# Patient Record
Sex: FEMALE | Race: WHITE | Employment: UNEMPLOYED | URBAN - METROPOLITAN AREA
[De-identification: names, ages, dates, MRNs, and addresses within clinical notes are randomized per-mention and may not be internally consistent; named-entity substitution may affect disease eponyms.]

---

## 2018-10-11 ENCOUNTER — HOSPITAL ENCOUNTER (INPATIENT)
Age: 23
LOS: 12 days | Discharge: HOME | DRG: 885 | End: 2018-10-23
Attending: EMERGENCY MEDICINE | Admitting: PSYCHIATRY & NEUROLOGY
Payer: COMMERCIAL

## 2018-10-11 DIAGNOSIS — R45.851 SUICIDAL IDEATION: Primary | ICD-10-CM

## 2018-10-11 PROBLEM — F32.A DEPRESSION WITH SUICIDAL IDEATION: Status: ACTIVE | Noted: 2018-10-11

## 2018-10-11 PROCEDURE — 6370000000 HC RX 637 (ALT 250 FOR IP): Performed by: EMERGENCY MEDICINE

## 2018-10-11 PROCEDURE — 1240000000 HC EMOTIONAL WELLNESS R&B

## 2018-10-11 PROCEDURE — 99285 EMERGENCY DEPT VISIT HI MDM: CPT

## 2018-10-11 PROCEDURE — 6370000000 HC RX 637 (ALT 250 FOR IP): Performed by: NURSE PRACTITIONER

## 2018-10-11 RX ORDER — HYDROXYZINE HYDROCHLORIDE 25 MG/1
50 TABLET, FILM COATED ORAL 3 TIMES DAILY PRN
Status: DISCONTINUED | OUTPATIENT
Start: 2018-10-11 | End: 2018-10-23 | Stop reason: HOSPADM

## 2018-10-11 RX ORDER — ALPRAZOLAM 0.25 MG/1
0.25 TABLET ORAL 4 TIMES DAILY PRN
COMMUNITY
End: 2018-10-25 | Stop reason: DRUGHIGH

## 2018-10-11 RX ORDER — CEFUROXIME AXETIL 250 MG/1
250 TABLET ORAL DAILY
Status: ON HOLD | COMMUNITY
End: 2019-02-09 | Stop reason: ALTCHOICE

## 2018-10-11 RX ORDER — DOCUSATE SODIUM 100 MG/1
100 CAPSULE, LIQUID FILLED ORAL DAILY
COMMUNITY
End: 2019-02-08 | Stop reason: ALTCHOICE

## 2018-10-11 RX ORDER — ACETAMINOPHEN 325 MG/1
650 TABLET ORAL EVERY 4 HOURS PRN
Status: DISCONTINUED | OUTPATIENT
Start: 2018-10-11 | End: 2018-10-23 | Stop reason: HOSPADM

## 2018-10-11 RX ORDER — ACETAMINOPHEN 325 MG/1
650 TABLET ORAL ONCE
Status: COMPLETED | OUTPATIENT
Start: 2018-10-11 | End: 2018-10-11

## 2018-10-11 RX ORDER — TRAZODONE HYDROCHLORIDE 50 MG/1
50 TABLET ORAL NIGHTLY PRN
Status: DISCONTINUED | OUTPATIENT
Start: 2018-10-12 | End: 2018-10-23 | Stop reason: HOSPADM

## 2018-10-11 RX ORDER — ALPRAZOLAM 0.25 MG/1
0.25 TABLET ORAL 4 TIMES DAILY PRN
Status: DISCONTINUED | OUTPATIENT
Start: 2018-10-11 | End: 2018-10-20

## 2018-10-11 RX ORDER — DOCUSATE SODIUM 100 MG/1
100 CAPSULE, LIQUID FILLED ORAL 2 TIMES DAILY
Status: DISCONTINUED | OUTPATIENT
Start: 2018-10-11 | End: 2018-10-12

## 2018-10-11 RX ORDER — NICOTINE 21 MG/24HR
1 PATCH, TRANSDERMAL 24 HOURS TRANSDERMAL DAILY
Status: DISCONTINUED | OUTPATIENT
Start: 2018-10-12 | End: 2018-10-12

## 2018-10-11 RX ORDER — DESONIDE 0.5 MG/G
CREAM TOPICAL 2 TIMES DAILY
COMMUNITY
End: 2019-02-08 | Stop reason: ALTCHOICE

## 2018-10-11 RX ORDER — LORAZEPAM 2 MG/ML
1 INJECTION INTRAMUSCULAR EVERY 4 HOURS PRN
Status: DISCONTINUED | OUTPATIENT
Start: 2018-10-11 | End: 2018-10-23 | Stop reason: HOSPADM

## 2018-10-11 RX ORDER — DICYCLOMINE HYDROCHLORIDE 10 MG/1
10 CAPSULE ORAL EVERY 6 HOURS PRN
Status: ON HOLD | COMMUNITY
End: 2018-11-07

## 2018-10-11 RX ADMIN — ALPRAZOLAM 0.25 MG: 0.25 TABLET ORAL at 22:39

## 2018-10-11 RX ADMIN — ACETAMINOPHEN 650 MG: 325 TABLET, FILM COATED ORAL at 22:38

## 2018-10-11 ASSESSMENT — PAIN SCALES - GENERAL
PAINLEVEL_OUTOF10: 10
PAINLEVEL_OUTOF10: 0

## 2018-10-11 ASSESSMENT — ENCOUNTER SYMPTOMS
ABDOMINAL DISTENTION: 0
EYE ITCHING: 0
RHINORRHEA: 0
VOMITING: 0
TROUBLE SWALLOWING: 0
SORE THROAT: 0
BACK PAIN: 0
VOICE CHANGE: 0
CONSTIPATION: 0
EYE PAIN: 0
COLOR CHANGE: 0
NAUSEA: 0
COUGH: 0
WHEEZING: 0
ABDOMINAL PAIN: 0
EYE DISCHARGE: 0
DIARRHEA: 0
SHORTNESS OF BREATH: 0

## 2018-10-11 ASSESSMENT — SLEEP AND FATIGUE QUESTIONNAIRES
AVERAGE NUMBER OF SLEEP HOURS: 5
SLEEP PATTERN: EARLY AWAKENING
DIFFICULTY ARISING: NO
DO YOU USE A SLEEP AID: NO
DIFFICULTY STAYING ASLEEP: YES
SLEEP PATTERN: EARLY AWAKENING
DIFFICULTY FALLING ASLEEP: NO
DIFFICULTY STAYING ASLEEP: YES
DIFFICULTY FALLING ASLEEP: NO
DO YOU HAVE DIFFICULTY SLEEPING: YES
AVERAGE NUMBER OF SLEEP HOURS: 5
DIFFICULTY ARISING: NO
DO YOU HAVE DIFFICULTY SLEEPING: YES
DO YOU USE A SLEEP AID: NO
RESTFUL SLEEP: YES
RESTFUL SLEEP: YES

## 2018-10-11 ASSESSMENT — LIFESTYLE VARIABLES: HISTORY_ALCOHOL_USE: NO

## 2018-10-11 ASSESSMENT — PAIN - FUNCTIONAL ASSESSMENT: PAIN_FUNCTIONAL_ASSESSMENT: 0-10

## 2018-10-12 LAB
-: ABNORMAL
AMORPHOUS: ABNORMAL
BACTERIA: ABNORMAL
BILIRUBIN URINE: ABNORMAL
CASTS UA: ABNORMAL /LPF
COLOR: ABNORMAL
COMMENT UA: ABNORMAL
CRYSTALS, UA: ABNORMAL /HPF
EPITHELIAL CELLS UA: ABNORMAL /HPF
GLUCOSE URINE: NEGATIVE
KETONES, URINE: NEGATIVE
LEUKOCYTE ESTERASE, URINE: NEGATIVE
MUCUS: ABNORMAL
NITRITE, URINE: NEGATIVE
OTHER OBSERVATIONS UA: ABNORMAL
PH UA: 6 (ref 5–8)
PROTEIN UA: NEGATIVE
RBC UA: ABNORMAL /HPF
RENAL EPITHELIAL, UA: ABNORMAL /HPF
SPECIFIC GRAVITY UA: 1.03 (ref 1–1.03)
TRICHOMONAS: ABNORMAL
TURBIDITY: ABNORMAL
URINE HGB: NEGATIVE
UROBILINOGEN, URINE: NORMAL
WBC UA: ABNORMAL /HPF
YEAST: ABNORMAL

## 2018-10-12 PROCEDURE — 1240000000 HC EMOTIONAL WELLNESS R&B

## 2018-10-12 PROCEDURE — 81001 URINALYSIS AUTO W/SCOPE: CPT

## 2018-10-12 PROCEDURE — 87086 URINE CULTURE/COLONY COUNT: CPT

## 2018-10-12 PROCEDURE — 6370000000 HC RX 637 (ALT 250 FOR IP): Performed by: NURSE PRACTITIONER

## 2018-10-12 PROCEDURE — 6370000000 HC RX 637 (ALT 250 FOR IP): Performed by: PSYCHIATRY & NEUROLOGY

## 2018-10-12 PROCEDURE — 90792 PSYCH DIAG EVAL W/MED SRVCS: CPT | Performed by: PSYCHIATRY & NEUROLOGY

## 2018-10-12 RX ORDER — DOCUSATE SODIUM 100 MG/1
100 CAPSULE, LIQUID FILLED ORAL DAILY
Status: DISCONTINUED | OUTPATIENT
Start: 2018-10-12 | End: 2018-10-23 | Stop reason: HOSPADM

## 2018-10-12 RX ORDER — BUPROPION HYDROCHLORIDE 150 MG/1
150 TABLET ORAL DAILY
Status: DISCONTINUED | OUTPATIENT
Start: 2018-10-13 | End: 2018-10-14

## 2018-10-12 RX ORDER — CEFUROXIME AXETIL 250 MG/1
250 TABLET ORAL DAILY
Status: DISCONTINUED | OUTPATIENT
Start: 2018-10-12 | End: 2018-10-13

## 2018-10-12 RX ORDER — DICYCLOMINE HYDROCHLORIDE 10 MG/1
10 CAPSULE ORAL EVERY 6 HOURS PRN
Status: DISCONTINUED | OUTPATIENT
Start: 2018-10-12 | End: 2018-10-23 | Stop reason: HOSPADM

## 2018-10-12 RX ORDER — ESCITALOPRAM OXALATE 10 MG/1
15 TABLET ORAL DAILY
Status: DISCONTINUED | OUTPATIENT
Start: 2018-10-12 | End: 2018-10-12

## 2018-10-12 RX ORDER — ESCITALOPRAM OXALATE 20 MG/1
20 TABLET ORAL DAILY
Status: DISCONTINUED | OUTPATIENT
Start: 2018-10-13 | End: 2018-10-15

## 2018-10-12 RX ADMIN — TRAZODONE HYDROCHLORIDE 50 MG: 50 TABLET ORAL at 21:01

## 2018-10-12 RX ADMIN — ESCITALOPRAM OXALATE 15 MG: 10 TABLET ORAL at 11:47

## 2018-10-12 RX ADMIN — HYDROXYZINE HYDROCHLORIDE 50 MG: 25 TABLET, FILM COATED ORAL at 21:01

## 2018-10-12 RX ADMIN — DOCUSATE SODIUM 100 MG: 100 CAPSULE, LIQUID FILLED ORAL at 11:47

## 2018-10-12 RX ADMIN — CEFUROXIME AXETIL 250 MG: 250 TABLET ORAL at 14:42

## 2018-10-12 RX ADMIN — ACETAMINOPHEN 650 MG: 325 TABLET, FILM COATED ORAL at 14:43

## 2018-10-12 ASSESSMENT — PAIN DESCRIPTION - LOCATION: LOCATION: HEAD

## 2018-10-12 ASSESSMENT — LIFESTYLE VARIABLES: HISTORY_ALCOHOL_USE: NO

## 2018-10-12 ASSESSMENT — PAIN DESCRIPTION - PAIN TYPE: TYPE: ACUTE PAIN

## 2018-10-12 ASSESSMENT — PAIN SCALES - GENERAL
PAINLEVEL_OUTOF10: 0
PAINLEVEL_OUTOF10: 6

## 2018-10-12 NOTE — H&P
HISTORY and Alfreda Gaitan [id number]       NAME:  Ruthie Weber  MRN: [id number]   YOB: 1995   Date: 10/12/2018   Age: 25 y.o. Gender: female     COMPLAINT AND PRESENT HISTORY:    Ruthie Weber is a 25 y.o.  female, admitted because of increasing depression with suicidal ideation. Patient had No plans. Patient has no history of previous suicide attempts. Patient denies any homicidal ideation. Patients current stressors include feelings of increased anxiety, depression and panic. Patient states that she attended [employer] and transferred to school in [place] for college and developed a stomach flu was hospitalized for 5 days and after discharge felt overwhelmed and also felt that she moved too quickly to an out of state college. Patient reports that she needed more support from her mother   Patient states that she has had depression for some time and was under management of psychiatrists however she was unable to find a psychiatrist in [place] and allowed her medications to lapse. Patient has been noncompliant with her psychiatric medications over a few months now. Patient admits to feeling hopeless, helpless, worthless, with a general lack of interest in everyday activities. . No history of auditory, visual or tactile hallucinations. Patient denies any current alcohol or substance abuse. Patient lives now with her mother. Patient has some  somatic complaints of burning with urination, headaches, body aches and anal itching that has been going on for a few weeks. patient denies any fever/chills, chest pain, shortness of breath.        DIAGNOSTIC RESULTS     PAST MEDICAL HISTORY     Past Medical History:   Diagnosis Date    Gastritis     PCOS (polycystic ovarian syndrome)        Pt denies any history of Diabetes mellitus type 2, hypertension, stroke, heart disease, COPD, Asthma, GERD, HLD, Cancer, Seizures,Thyroid disease, Kidney Disease, Hepatitis, TB.    SURGICAL HISTORY       Past Surgical History:   Procedure Laterality Date    OVARIAN CYST REMOVAL         FAMILY HISTORY     History reviewed. No pertinent family history. SOCIAL HISTORY       Social History     Social History    Marital status: Single     Spouse name: N/A    Number of children: N/A    Years of education: N/A     Social History Main Topics    Smoking status: Never Smoker    Smokeless tobacco: Never Used    Alcohol use No    Drug use: No    Sexual activity: Not Asked     Other Topics Concern    None     Social History Narrative    None        REVIEW OF SYSTEMS      Allergies   Allergen Reactions    Phenergan [Promethazine Hcl]      Muscle spasm       No current facility-administered medications on file prior to encounter. No current outpatient prescriptions on file prior to encounter. General health:  Fairly good. No fever or chills. Skin:  No itching, redness or rash. Head, eyes, ears, nose, throat:  No headache, epistaxis, rhinorrhea hearing loss or sore throat. Neck:  No pain, stiffness or masses. Cardiovascular/Respiratory system:  No chest pain, palpitation, shortness of breath, coughing or expectoration. Gastrointestinal tract: No abdominal pain, nausea, vomiting, diarrhea or constipation. Patient complains of some diarrhea and anal itchiness that has been going on for a few weeks. Genitourinary:  No burning on micturition. No hesitancy, urgency, frequency or discoloration of urine. Patient complains of some burning with urination. Locomotor:  No bone or joint pains. No swelling or deformities. Neuropsychiatric:  See HPI. GENERAL PHYSICAL EXAM:     Vitals: /74   Pulse 84   Temp 98 °F (36.7 °C) (Oral)   Resp 14   Ht 5' 7\" (1.702 m)   Wt 205 lb (93 kg)   LMP 10/11/2018   SpO2 97%   BMI 32.11 kg/m²  Body mass index is 32.11 kg/m². Pt was examined with a nurse present in the room.      GENERAL

## 2018-10-12 NOTE — ED NOTES
Provisional Diagnosis:   Patient reports Major Depression, Mild Autism     Psychosocial and Contextual Factors:  Patient reports that she sees Dr. Abigail Kocher in the [facility] Summary:   Patient: X  Family:   Agency: X(EPIC)     Present Suicidal Behavior:     Verbal: Yes   Attempt: Patient denies    Past Suicidal Behavior:    Verbal: Patient denies    Attempt: Patient denies     Self-Injurious/Self-Mutilation: Patient denies       Trauma Identified:  Patient denies      Protective Factors: Patient has Blue Cross/ Shield     Risk Factors:  Patient has had previous psychiatric hospitalization. Substance Abuse: Patient denies     Clinical Summary:  Patient is a 58-year-old  female who came into the [facility] emergency center on a voluntary status at the request of her psychiatrist Dr. Abigail Kocher with concerns for increase in depression and suicidal ideation. Patient reports that she is suicidal. She reports that it is triggered by her being off her medications while in [place] where she was going to school. Patient reports that she could not find a doctor to get her medications filled. Patient reports that she was also hospitalized for 5 days with stomach problems while in [place]. Patient reports panic attacks where she feels like she cant breathe and feels like rocks are on her chest. Patient reports an increase in anxiety, depression, and suicidal thoughts. Patient denies homicidal ideation. Patient denies auditory and visual hallucination. Level of Care Disposition:  Writer consulted with Heather Packer. Patient to be admitted to [facility] for safety and stabilization.

## 2018-10-12 NOTE — CARE COORDINATION
BHI Biopsychosocial Assessment    Current Level of Psychosocial Functioning     Independent   Dependent  X  Minimal Assist     Comments: PT reports that prior to admission she was living with her mother and stepfather in [place], [place], PT reports a plan to return there following discharge PT reports that she gets SSI income. PT presents on admission with an increase in symptoms of Depression and Anxiety and having  suicidal ideations. Psychosocial High Risk Factors (check all that apply)    Unable to obtain meds   Chronic illness/pain    Substance abuse   Lack of Family Support   Financial stress   Isolation X  Inadequate Community Resources  Suicide attempt(s)  Not taking medications X  Victim of crime   Developmental Delay  Unable to manage personal needs    Age 72 or older   Homeless  No transportation   Readmission within 30 days  Unemployment  Traumatic Event    Psychiatric Advanced Directives: none reported     Family to Involve in Treatment: PT reports that her mother and stepfather are supportive and involved in her care. Sexual Orientation:  SYLVESTER    Patient Strengths: insurance, follows up with private Psychiatrist Dr. Madie Rodrigues at [facility] ,denies any alcohol or illicit drug use. Patient Barriers: previous Psychiatric admissions , presenting on admission with suicidal ideation     Opiate Education Provided: N/A PT denies and does not have documented history of Opiate or Heroin use/abuse. CMHC/mental health history: PT reports that she has been linked with Dr. Madie Rodrigues at [facility] for mental health services. Plan of Care   medication management, group/individual therapies, family meetings, psycho -education, treatment team meetings to assist with stabilization, referral to community resources     Initial Discharge Plan:  PT reports a plan to return home in [place] to live with her mother and stepfather and to continue to follow up with therapy with Dr. Monie Rivera at [facility].       Clinical

## 2018-10-13 LAB
CULTURE: NORMAL
Lab: NORMAL
SPECIMEN DESCRIPTION: NORMAL
STATUS: NORMAL

## 2018-10-13 PROCEDURE — 6370000000 HC RX 637 (ALT 250 FOR IP): Performed by: INTERNAL MEDICINE

## 2018-10-13 PROCEDURE — 6370000000 HC RX 637 (ALT 250 FOR IP): Performed by: NURSE PRACTITIONER

## 2018-10-13 PROCEDURE — 99221 1ST HOSP IP/OBS SF/LOW 40: CPT | Performed by: INTERNAL MEDICINE

## 2018-10-13 PROCEDURE — 6370000000 HC RX 637 (ALT 250 FOR IP): Performed by: PSYCHIATRY & NEUROLOGY

## 2018-10-13 PROCEDURE — 1240000000 HC EMOTIONAL WELLNESS R&B

## 2018-10-13 RX ORDER — NITROFURANTOIN 25; 75 MG/1; MG/1
100 CAPSULE ORAL EVERY 12 HOURS SCHEDULED
Status: DISCONTINUED | OUTPATIENT
Start: 2018-10-13 | End: 2018-10-13 | Stop reason: ALTCHOICE

## 2018-10-13 RX ORDER — CEFUROXIME AXETIL 250 MG/1
250 TABLET ORAL EVERY 12 HOURS SCHEDULED
Status: DISCONTINUED | OUTPATIENT
Start: 2018-10-14 | End: 2018-10-23 | Stop reason: HOSPADM

## 2018-10-13 RX ADMIN — ALPRAZOLAM 0.25 MG: 0.25 TABLET ORAL at 21:25

## 2018-10-13 RX ADMIN — NITROFURANTOIN (MONOHYDRATE/MACROCRYSTALS) 100 MG: 75; 25 CAPSULE ORAL at 09:28

## 2018-10-13 RX ADMIN — ACETAMINOPHEN 650 MG: 325 TABLET, FILM COATED ORAL at 09:25

## 2018-10-13 RX ADMIN — ESCITALOPRAM OXALATE 20 MG: 20 TABLET ORAL at 09:25

## 2018-10-13 RX ADMIN — ACETAMINOPHEN 650 MG: 325 TABLET, FILM COATED ORAL at 21:25

## 2018-10-13 RX ADMIN — BUPROPION HYDROCHLORIDE 150 MG: 150 TABLET, FILM COATED, EXTENDED RELEASE ORAL at 09:25

## 2018-10-13 RX ADMIN — HYDROXYZINE HYDROCHLORIDE 50 MG: 25 TABLET, FILM COATED ORAL at 22:22

## 2018-10-13 RX ADMIN — DOCUSATE SODIUM 100 MG: 100 CAPSULE, LIQUID FILLED ORAL at 09:25

## 2018-10-13 ASSESSMENT — PAIN SCALES - GENERAL
PAINLEVEL_OUTOF10: 3
PAINLEVEL_OUTOF10: 8
PAINLEVEL_OUTOF10: 0
PAINLEVEL_OUTOF10: 1

## 2018-10-13 NOTE — BH NOTE
Psychoeducation Group Note    Date: 10/12  Start Time: 8:30  End Time: 9:00    Number Participants in Group:  11/22    Goal:   group  Topic:     Discipline Responsible:   OT  AT  [facility].  RT BHT2 Other       Participation Level:     None  Minimal   x Active Listener  Interactive    Monopolizing         Participation Quality:  x Appropriate  Inappropriate   x       Attentive        Intrusive   x       Sharing        Resistant   x       Supportive        Lethargic       Affective:   x Congruent  Incongruent  Blunted  Flat    Constricted  Anxious  Elated  Angry    Labile  Depressed  Other         Cognitive:  x Alert  Oriented PPTP     Concentration x G  F  P   Attention Span x G  F  P   Short-Term Memory x G  F  P   Long-Term Memory x G  F  P   ProblemSolving/  Decision Making x G  F  P   Ability to Process  Information x G  F  P      Contributing Factors             Delusional             Hallucinating             Flight of Ideas             Other:       Modes of Intervention:   Education x Support  Exploration    Clarifying  Problem Solving  Confrontation   x Socialization  Limit Setting  Reality Testing    Activity  Movement  Media    Other:            Response to Learning:  x Able to verbalize current knowledge/experience   x Able to verbalize/acknowledge new learning   x Able to retain information   x Capable of insight   x Able to change behavior   x Progressing to goal    Other:        Comments:

## 2018-10-13 NOTE — PROGRESS NOTES
Department of Psychiatry  Attending Physician Psychiatric Assessment     CHIEF COMPLAINT:  Suicidal ideations    History obtained from:  patient, electronic medical record    HISTORY OF PRESENT ILLNESS:    Bhavik Aranda is a 25 y.o. female who presented to the ED with suicidal ideations and no specific plan for the past few weeks. She has not been able to take care of herself and had to quit college in [place] and come back here due to anxiety and depression symptoms. She has been having poor appetite, losing weight, poor energy, poor concentration, feeling worthless, feeling hopeless, anhedonia, interrupted sleep and suicidal ideations. She denied any psychosis or history of manic episodes. The patient has history of Asperger's disease, generalized anxiety disorder and panic disorder. The panic attacks are 5-6 times a day lasting variable time and relieved only by Xanax. They are described as intense anxiety with chest tightness, shortness of breath, shakiness, sweatiness. . She has excessive worries about multiple aspects of her life associated with easy fatigability, insomnia and poor concentration. The patient has been off of her medications BuSpar, Lexapro and Wellbutrin for a few months while on [place] until she was restarted on Lexapro very recently. PAST PSYCHIATRIC HISTORY:    He was diagnosed with Asperger's syndrome, anxiety, panic attacks and depression and was hospitalized 4 times as a teenager. She has never attempt suicide.       Past Medical History:        Diagnosis Date    Gastritis     PCOS (polycystic ovarian syndrome)        Past Surgical History:        Procedure Laterality Date    OVARIAN CYST REMOVAL         Medications Prior to Admission:   Prescriptions Prior to Admission: Escitalopram Oxalate (LEXAPRO PO), Take 15 mg by mouth daily  dicyclomine (BENTYL) 10 MG capsule, Take 10 mg by mouth every 6 hours as needed  ALPRAZolam (XANAX) 0.25 MG tablet, Take 0.25 mg by mouth 4 times

## 2018-10-14 PROBLEM — F33.2 MAJOR DEPRESSIVE DISORDER, RECURRENT EPISODE, SEVERE WITH ANXIOUS DISTRESS (HCC): Chronic | Status: ACTIVE | Noted: 2018-10-11

## 2018-10-14 PROCEDURE — 6370000000 HC RX 637 (ALT 250 FOR IP): Performed by: NURSE PRACTITIONER

## 2018-10-14 PROCEDURE — 6370000000 HC RX 637 (ALT 250 FOR IP): Performed by: PSYCHIATRY & NEUROLOGY

## 2018-10-14 PROCEDURE — 1240000000 HC EMOTIONAL WELLNESS R&B

## 2018-10-14 PROCEDURE — 99232 SBSQ HOSP IP/OBS MODERATE 35: CPT | Performed by: PSYCHIATRY & NEUROLOGY

## 2018-10-14 PROCEDURE — 6370000000 HC RX 637 (ALT 250 FOR IP): Performed by: INTERNAL MEDICINE

## 2018-10-14 RX ORDER — LACTOBACILLUS RHAMNOSUS GG 10B CELL
2 CAPSULE ORAL 2 TIMES DAILY WITH MEALS
Status: DISCONTINUED | OUTPATIENT
Start: 2018-10-14 | End: 2018-10-23 | Stop reason: HOSPADM

## 2018-10-14 RX ORDER — BUSPIRONE HYDROCHLORIDE 10 MG/1
5 TABLET ORAL 3 TIMES DAILY
Status: DISCONTINUED | OUTPATIENT
Start: 2018-10-14 | End: 2018-10-15

## 2018-10-14 RX ADMIN — ALPRAZOLAM 0.25 MG: 0.25 TABLET ORAL at 18:14

## 2018-10-14 RX ADMIN — BUSPIRONE HYDROCHLORIDE 5 MG: 10 TABLET ORAL at 22:12

## 2018-10-14 RX ADMIN — Medication 2 CAPSULE: at 10:06

## 2018-10-14 RX ADMIN — BUPROPION HYDROCHLORIDE 150 MG: 150 TABLET, FILM COATED, EXTENDED RELEASE ORAL at 10:02

## 2018-10-14 RX ADMIN — CEFUROXIME AXETIL 250 MG: 250 TABLET ORAL at 22:13

## 2018-10-14 RX ADMIN — Medication 2 CAPSULE: at 18:14

## 2018-10-14 RX ADMIN — CEFUROXIME AXETIL 250 MG: 250 TABLET ORAL at 10:02

## 2018-10-14 RX ADMIN — HYDROXYZINE HYDROCHLORIDE 50 MG: 25 TABLET, FILM COATED ORAL at 22:13

## 2018-10-14 NOTE — BH NOTE
Psychoeducation Group Note    Date: 1014/18   Start Time: 1100AM  End Time: 1145am    Number Participants in Group:  13    Goal:  Patient will demonstrate increased interpersonal interaction   Topic: SKILLS GROUP: Communication Skills Group    Discipline Responsible:   OT  AT  [facility]. x RT  Other       Participation Level:     None  Minimal   x Active Listener x Interactive    Monopolizing         Participation Quality:   Appropriate  Inappropriate   x       Attentive        Intrusive   x       Sharing        Resistant   x       Supportive        Lethargic       Affective:    Congruent  Incongruent X Blunted  Flat    Constricted  Anxious  Elated  Angry    Labile  Depressed  Other x brightens       Cognitive:  x Alert x Oriented PPTP     Concentration x G  F  P   Attention Span  G X F  P   Short-Term Memory  G  F  P   Long-Term Memory  G  F  P   ProblemSolving/  Decision Making  G x F  P   Ability to Process  Information x G  F  P      Contributing Factors             Delusional             Hallucinating             Flight of Ideas             Other:       Modes of Intervention:  x Education x Support x Exploration   x Clarifying x Problem Solving  Confrontation   x Socialization  Limit Setting  Reality Testing   x Activity  Movement  Media    Other:            Response to Learning:  x Able to verbalize current knowledge/experience   x Able to verbalize/acknowledge new learning   x Able to retain information    Capable of insight    Able to change behavior   x Progressing to goal    Other:        Comments:

## 2018-10-14 NOTE — CONSULTS
[facility]    Patient name:  Anna Lau  Date of admission:  10/11/2018  6:04 PM  MRN:   [id number]  YOB: 1995          HPI   Pt admitted with sympts of depression     C/o dysuria - u/a negative  Uses ceftin for acne sees dermatology     Past Medical History:   Diagnosis Date    Gastritis     PCOS (polycystic ovarian syndrome)      History reviewed. No pertinent family history. reports that she has never smoked. She has never used smokeless tobacco. She reports that she does not drink alcohol or use drugs. Past Medical History:   Diagnosis Date    Gastritis     PCOS (polycystic ovarian syndrome)      Allergies   Allergen Reactions    Phenergan [Promethazine Hcl]      Muscle spasm       Review of systems    Constitutional: Negative for fever and fatigue. Respiratory: Negative for cough and shortness of breath. Cardiovascular: Negative for chest pain, palpitations and leg swelling. Gastrointestinal: Negative for abdominal pain, diarrhea and blood in stool. Endocrine: Negative for cold intolerance. Genitourinary: Negative for dysuria and frequency. Musculoskeletal: Negative for back pain and arthralgias. Skin: Negative for color change and rash. Neurological: Negative for dizziness and headaches. Psychiatric/Behavioral: Negative for confusion. ROS  Physical exam     /75   Pulse 95   Temp 97.5 °F (36.4 °C)   Resp 14   Ht 5' 7\" (1.702 m)   Wt 205 lb (93 kg)   LMP 10/11/2018   SpO2 97%   BMI 32.11 kg/m²      General appearance: well nourished in no distress  Eyes:  KAVYA   Head: AT/NC  ENT NAD   Neck: Trachea midline; supple  Lungs: normal effort, clear to auscultation. CVS sinus with no murmurs. Vasc No JVP, no carotid bruit  Abdomen: Soft, non-tender; no masses or HSM,   Extremities: no edema; no digital cyanosis or clubbing.   Musculoskeletal NO joint effusion or synovitis  Skin: No rash or ulcers  Neurologic: Cranial nerves II-XII grossly intact; no motor deficit. Psych: Appropriate affect, alert and oriented to person, place and time  NO lymphadenopathy                 Assessment / Plan      Patient Active Problem List   Diagnosis    Suicidal ideation    Depression with suicidal ideation       A/P  Dysuria u/a negative  Diarrhea can be antibiotic associated does not want to stop due to acne   Add MD TERRA Kingston [address], [address] [place] [address].    Phone [phone]   Fax: [phone]  Answering Service: [phone]

## 2018-10-14 NOTE — PROGRESS NOTES
capsule 10 mg  10 mg Oral Q6H PRN Kvng Nicolas MD        docusate sodium (COLACE) capsule 100 mg  100 mg Oral Daily Kvng Nicolas MD   100 mg at 10/13/18 0925    norethindrone-ethinyl estradiol (ORTHO-NOVUM 1-35 TAB;NORTREL 1-35 TAB) 1-35 MG-MCG per tablet 1 tablet  1 tablet Oral Daily Kvng Nicolas MD        escitalopram (LEXAPRO) tablet 20 mg  20 mg Oral Daily Kvng Nicolas MD   20 mg at 10/13/18 0925    acetaminophen (TYLENOL) tablet 650 mg  650 mg Oral Q4H PRN Erick Rae APRN - CNP   650 mg at 10/13/18 2125    hydrOXYzine (ATARAX) tablet 50 mg  50 mg Oral TID PRN Eirck Rae APRN - CNP   50 mg at 10/13/18 2222    LORazepam (ATIVAN) injection 1 mg  1 mg Intramuscular Q4H PRN Erick Rae APRN - CNP        traZODone (DESYREL) tablet 50 mg  50 mg Oral Nightly PRN Erick Rae APRN - CNP   50 mg at 10/12/18 2101    magnesium hydroxide (MILK OF MAGNESIA) 400 MG/5ML suspension 30 mL  30 mL Oral Daily PRN Erick Rae APRN - CNP        ALPRAZolam Linward Bray) tablet 0.25 mg  0.25 mg Oral 4x Daily PRN Erick Rae, APRN - CNP   0.25 mg at 10/13/18 2125         lactobacillus  2 capsule Oral BID WC    busPIRone  5 mg Oral TID    cefUROXime  250 mg Oral 2 times per day    docusate sodium  100 mg Oral Daily    norethindrone-ethinyl estradiol  1 tablet Oral Daily    escitalopram  20 mg Oral Daily       ASSESSMENT  Major depressive disorder, recurrent episode, severe with anxious distress ([facility] [address]     Patient's Response to Treatment: positive    PLAN  Continue medication management, discontinue Wellbutrin and add BuSpar. Engage her in unit milieu and group psychotherapy, safety and discharge plan  Dragon voice recognition software used in portions of this document.

## 2018-10-15 PROCEDURE — 6370000000 HC RX 637 (ALT 250 FOR IP): Performed by: INTERNAL MEDICINE

## 2018-10-15 PROCEDURE — 1240000000 HC EMOTIONAL WELLNESS R&B

## 2018-10-15 PROCEDURE — 6370000000 HC RX 637 (ALT 250 FOR IP): Performed by: PSYCHIATRY & NEUROLOGY

## 2018-10-15 PROCEDURE — 99232 SBSQ HOSP IP/OBS MODERATE 35: CPT | Performed by: PSYCHIATRY & NEUROLOGY

## 2018-10-15 PROCEDURE — 6370000000 HC RX 637 (ALT 250 FOR IP): Performed by: NURSE PRACTITIONER

## 2018-10-15 RX ADMIN — CEFUROXIME AXETIL 250 MG: 250 TABLET ORAL at 22:27

## 2018-10-15 RX ADMIN — BUSPIRONE HYDROCHLORIDE 5 MG: 10 TABLET ORAL at 09:44

## 2018-10-15 RX ADMIN — ALPRAZOLAM 0.25 MG: 0.25 TABLET ORAL at 22:28

## 2018-10-15 RX ADMIN — Medication 2 CAPSULE: at 18:04

## 2018-10-15 RX ADMIN — CEFUROXIME AXETIL 250 MG: 250 TABLET ORAL at 09:43

## 2018-10-15 RX ADMIN — ESCITALOPRAM OXALATE 20 MG: 20 TABLET ORAL at 09:43

## 2018-10-15 RX ADMIN — Medication 2 CAPSULE: at 09:42

## 2018-10-15 RX ADMIN — HYDROXYZINE HYDROCHLORIDE 50 MG: 25 TABLET, FILM COATED ORAL at 22:28

## 2018-10-15 NOTE — PROGRESS NOTES
PSYCHOEDUCATION GROUP NOTE    Date:   10/15/2018 Start Time: 0845  End Time: 0915    Number Participants in Group:  12    Goal:  Patient will demonstrate increased interpersonal interaction   Topic: community meeting / goal setting    Discipline Responsible:   OT  AT  [facility]. X RT MHP Other       Participation Level:     None  Minimal   X Active Listener X Interactive    Monopolizing         Participation Quality:  X Appropriate  Inappropriate   X       Attentive        Intrusive   X       Sharing        Resistant   X       Supportive        Lethargic       Affective:    Congruent  Incongruent  Blunted  Flat    Constricted x Anxious  Elated  Angry    Labile  Depressed  Other  bright       Cognitive:  X Alert  Oriented PPTP     Concentration  G  F x P   Attention Span  G  F x P   Short-Term Memory  G  F  P   Long-Term Memory  G  F  P   ProblemSolving/  Decision Making  G  F x P   Ability to Process  Information  G  F x P      Contributing Factors             Delusional             Hallucinating   x          Flight of Ideas             Other:       Modes of Intervention:  x Education x Support x Exploration    Clarifying x Problem Solving  Confrontation   x Socialization  Limit Setting  Reality Testing   x Activity  Movement  Media    Other:            Response to Learning:  X Able to verbalize current knowledge/experience   X Able to verbalize/acknowledge new learning   X Able to retain information   X Capable of insight    Able to change behavior   X Progressing to goal    Other:        Comments

## 2018-10-15 NOTE — PROGRESS NOTES
PSYCHOEDUCATION GROUP NOTE    Date:   10/15/2018 Start Time: 1100  End Time: 6153    Number Participants in Group:  7    Goal:  Patient will demonstrate increased interpersonal interaction   Topic: cognitive skills group    Discipline Responsible:   OT  AT  [facility]. X RT MHP Other       Participation Level:     None  Minimal   X Active Listener X Interactive    Monopolizing         Participation Quality:  X Appropriate  Inappropriate   X       Attentive        Intrusive   X       Sharing        Resistant   X       Supportive        Lethargic       Affective:    Congruent  Incongruent  Blunted  Flat    Constricted  Anxious  Elated  Angry    Labile  Depressed  Other x bright       Cognitive:  X Alert X Oriented PPTP     Concentration X G  F  P   Attention Span X G  F  P   Short-Term Memory  G  F  P   Long-Term Memory  G  F  P   ProblemSolving/  Decision Making X G  F  P   Ability to Process  Information X G  F  P      Contributing Factors             Delusional             Hallucinating             Flight of Ideas             Other:       Modes of Intervention:  x Education x Support x Exploration    Clarifying x Problem Solving  Confrontation   x Socialization  Limit Setting  Reality Testing   x Activity  Movement  Media    Other:            Response to Learning:  X Able to verbalize current knowledge/experience   X Able to verbalize/acknowledge new learning   X Able to retain information   X Capable of insight    Able to change behavior   X Progressing to goal    Other:        Comments:

## 2018-10-16 LAB
ABSOLUTE EOS #: 0.2 K/UL (ref 0–0.4)
ABSOLUTE IMMATURE GRANULOCYTE: ABNORMAL K/UL (ref 0–0.3)
ABSOLUTE LYMPH #: 2 K/UL (ref 1–4.8)
ABSOLUTE MONO #: 0.6 K/UL (ref 0.1–1.3)
ALBUMIN SERPL-MCNC: 3.6 G/DL (ref 3.5–5.2)
ALBUMIN/GLOBULIN RATIO: ABNORMAL (ref 1–2.5)
ALP BLD-CCNC: 77 U/L (ref 35–104)
ALT SERPL-CCNC: 22 U/L (ref 5–33)
ANION GAP SERPL CALCULATED.3IONS-SCNC: 8 MMOL/L (ref 9–17)
AST SERPL-CCNC: 12 U/L
BASOPHILS # BLD: 1 % (ref 0–2)
BASOPHILS ABSOLUTE: 0 K/UL (ref 0–0.2)
BILIRUB SERPL-MCNC: 0.43 MG/DL (ref 0.3–1.2)
BUN BLDV-MCNC: 8 MG/DL (ref 6–20)
BUN/CREAT BLD: ABNORMAL (ref 9–20)
CALCIUM SERPL-MCNC: 8.7 MG/DL (ref 8.6–10.4)
CHLORIDE BLD-SCNC: 106 MMOL/L (ref 98–107)
CHOLESTEROL/HDL RATIO: 4.1
CHOLESTEROL: 157 MG/DL
CO2: 27 MMOL/L (ref 20–31)
CREAT SERPL-MCNC: 0.68 MG/DL (ref 0.5–0.9)
DIFFERENTIAL TYPE: ABNORMAL
EOSINOPHILS RELATIVE PERCENT: 3 % (ref 0–4)
ESTIMATED AVERAGE GLUCOSE: 85 MG/DL
GFR AFRICAN AMERICAN: >60 ML/MIN
GFR NON-AFRICAN AMERICAN: >60 ML/MIN
GFR SERPL CREATININE-BSD FRML MDRD: ABNORMAL ML/MIN/{1.73_M2}
GFR SERPL CREATININE-BSD FRML MDRD: ABNORMAL ML/MIN/{1.73_M2}
GLUCOSE BLD-MCNC: 89 MG/DL (ref 70–99)
HBA1C MFR BLD: 4.6 % (ref 4–6)
HCT VFR BLD CALC: 41.4 % (ref 36–46)
HDLC SERPL-MCNC: 38 MG/DL
HEMOGLOBIN: 14.1 G/DL (ref 12–16)
IMMATURE GRANULOCYTES: ABNORMAL %
LDL CHOLESTEROL: 88 MG/DL (ref 0–130)
LYMPHOCYTES # BLD: 29 % (ref 25–45)
MCH RBC QN AUTO: 29.7 PG (ref 26–34)
MCHC RBC AUTO-ENTMCNC: 34.2 G/DL (ref 31–37)
MCV RBC AUTO: 87 FL (ref 80–100)
MONOCYTES # BLD: 9 % (ref 2–8)
NRBC AUTOMATED: ABNORMAL PER 100 WBC
PDW BLD-RTO: 12.8 % (ref 11.5–14.9)
PLATELET # BLD: 213 K/UL (ref 150–450)
PLATELET ESTIMATE: ABNORMAL
PMV BLD AUTO: 8.2 FL (ref 6–12)
POTASSIUM SERPL-SCNC: 4.1 MMOL/L (ref 3.7–5.3)
RBC # BLD: 4.76 M/UL (ref 4–5.2)
RBC # BLD: ABNORMAL 10*6/UL
SEG NEUTROPHILS: 58 % (ref 34–64)
SEGMENTED NEUTROPHILS ABSOLUTE COUNT: 4.1 K/UL (ref 1.3–9.1)
SODIUM BLD-SCNC: 141 MMOL/L (ref 135–144)
TOTAL PROTEIN: 6 G/DL (ref 6.4–8.3)
TRIGL SERPL-MCNC: 155 MG/DL
TSH SERPL DL<=0.05 MIU/L-ACNC: 1.37 MIU/L (ref 0.3–5)
VITAMIN D 25-HYDROXY: 15.2 NG/ML (ref 30–100)
VLDLC SERPL CALC-MCNC: ABNORMAL MG/DL (ref 1–30)
WBC # BLD: 6.9 K/UL (ref 4.5–13.5)
WBC # BLD: ABNORMAL 10*3/UL

## 2018-10-16 PROCEDURE — 6370000000 HC RX 637 (ALT 250 FOR IP): Performed by: PSYCHIATRY & NEUROLOGY

## 2018-10-16 PROCEDURE — 6370000000 HC RX 637 (ALT 250 FOR IP): Performed by: INTERNAL MEDICINE

## 2018-10-16 PROCEDURE — 99232 SBSQ HOSP IP/OBS MODERATE 35: CPT | Performed by: PSYCHIATRY & NEUROLOGY

## 2018-10-16 PROCEDURE — 85025 COMPLETE CBC W/AUTO DIFF WBC: CPT

## 2018-10-16 PROCEDURE — 84443 ASSAY THYROID STIM HORMONE: CPT

## 2018-10-16 PROCEDURE — 80061 LIPID PANEL: CPT

## 2018-10-16 PROCEDURE — 80053 COMPREHEN METABOLIC PANEL: CPT

## 2018-10-16 PROCEDURE — 82306 VITAMIN D 25 HYDROXY: CPT

## 2018-10-16 PROCEDURE — 6370000000 HC RX 637 (ALT 250 FOR IP): Performed by: NURSE PRACTITIONER

## 2018-10-16 PROCEDURE — 1240000000 HC EMOTIONAL WELLNESS R&B

## 2018-10-16 PROCEDURE — 36415 COLL VENOUS BLD VENIPUNCTURE: CPT

## 2018-10-16 PROCEDURE — 83036 HEMOGLOBIN GLYCOSYLATED A1C: CPT

## 2018-10-16 RX ADMIN — Medication 2 CAPSULE: at 16:57

## 2018-10-16 RX ADMIN — Medication 2 CAPSULE: at 08:21

## 2018-10-16 RX ADMIN — CEFUROXIME AXETIL 250 MG: 250 TABLET ORAL at 08:21

## 2018-10-16 RX ADMIN — ALPRAZOLAM 0.25 MG: 0.25 TABLET ORAL at 21:40

## 2018-10-16 RX ADMIN — VORTIOXETINE 10 MG: 10 TABLET, FILM COATED ORAL at 08:22

## 2018-10-16 RX ADMIN — HYDROXYZINE HYDROCHLORIDE 50 MG: 25 TABLET, FILM COATED ORAL at 21:37

## 2018-10-16 RX ADMIN — CEFUROXIME AXETIL 250 MG: 250 TABLET ORAL at 21:37

## 2018-10-16 NOTE — PROGRESS NOTES
capsule  2 capsule Oral BID  Lizett Pierson MD   2 capsule at 10/15/18 1804    cefUROXime (CEFTIN) tablet 250 mg  250 mg Oral 2 times per day Lizett Pierson MD   250 mg at 10/15/18 0943    dicyclomine (BENTYL) capsule 10 mg  10 mg Oral Q6H PRN Lieutenant Charlee MD        docusate sodium (COLACE) capsule 100 mg  100 mg Oral Daily Lieutenant Charlee MD   100 mg at 10/13/18 0925    norethindrone-ethinyl estradiol (ORTHO-NOVUM 1-35 TAB;NORTREL 1-35 TAB) 1-35 MG-MCG per tablet 1 tablet  1 tablet Oral Daily Lieutenant Charlee MD   1 tablet at 10/15/18 0944    acetaminophen (TYLENOL) tablet 650 mg  650 mg Oral Q4H PRN Abron Gale, APRN - CNP   650 mg at 10/13/18 2125    hydrOXYzine (ATARAX) tablet 50 mg  50 mg Oral TID PRN Abron Gale, APRN - CNP   50 mg at 10/14/18 2213    LORazepam (ATIVAN) injection 1 mg  1 mg Intramuscular Q4H PRN Abron Gale, APRN - CNP        traZODone (DESYREL) tablet 50 mg  50 mg Oral Nightly PRN Abron Gale, APRN - CNP   50 mg at 10/12/18 2101    magnesium hydroxide (MILK OF MAGNESIA) 400 MG/5ML suspension 30 mL  30 mL Oral Daily PRN Abron Gale, APRN - CNP        ALPRAZolam [place]) tablet 0.25 mg  0.25 mg Oral 4x Daily PRN Abron Gale, APRN - CNP   0.25 mg at 10/14/18 1814         [START ON 10/16/2018] VORTIoxetine  10 mg Oral Daily with breakfast    lactobacillus  2 capsule Oral BID     cefUROXime  250 mg Oral 2 times per day    docusate sodium  100 mg Oral Daily    norethindrone-ethinyl estradiol  1 tablet Oral Daily       ASSESSMENT  Major depressive disorder, recurrent episode, severe with anxious distress ([facility] [address]     Patient's Response to Treatment: positive    PLAN  · Continue medication management, discontinue Wellbutrin, Lexapro and BuSpar. · Start Trintellix 10 mg daily with breakfast.  she gave informed consent.   · Engage her in unit milieu and group psychotherapy, safety and discharge plan      María Elena voice recognition software used in portions of this document.

## 2018-10-17 PROCEDURE — 6370000000 HC RX 637 (ALT 250 FOR IP): Performed by: INTERNAL MEDICINE

## 2018-10-17 PROCEDURE — 6370000000 HC RX 637 (ALT 250 FOR IP): Performed by: NURSE PRACTITIONER

## 2018-10-17 PROCEDURE — 99232 SBSQ HOSP IP/OBS MODERATE 35: CPT | Performed by: PSYCHIATRY & NEUROLOGY

## 2018-10-17 PROCEDURE — 6370000000 HC RX 637 (ALT 250 FOR IP): Performed by: PSYCHIATRY & NEUROLOGY

## 2018-10-17 PROCEDURE — 1240000000 HC EMOTIONAL WELLNESS R&B

## 2018-10-17 RX ADMIN — CEFUROXIME AXETIL 250 MG: 250 TABLET ORAL at 08:26

## 2018-10-17 RX ADMIN — ALPRAZOLAM 0.25 MG: 0.25 TABLET ORAL at 04:53

## 2018-10-17 RX ADMIN — ALPRAZOLAM 0.25 MG: 0.25 TABLET ORAL at 21:04

## 2018-10-17 RX ADMIN — VITAMIN D, TAB 1000IU (100/BT) 2000 UNITS: 25 TAB at 13:39

## 2018-10-17 RX ADMIN — VORTIOXETINE 10 MG: 10 TABLET, FILM COATED ORAL at 08:26

## 2018-10-17 RX ADMIN — Medication 2 CAPSULE: at 17:19

## 2018-10-17 RX ADMIN — CEFUROXIME AXETIL 250 MG: 250 TABLET ORAL at 21:04

## 2018-10-17 RX ADMIN — Medication 2 CAPSULE: at 08:26

## 2018-10-17 RX ADMIN — HYDROXYZINE HYDROCHLORIDE 50 MG: 25 TABLET, FILM COATED ORAL at 21:04

## 2018-10-17 NOTE — PROGRESS NOTES
Department of Psychiatry  Attending Progress Note  Chief Complaint: Major depressive disorder, recurrent episode, severe with anxious distress ([address]     SUBJECTIVE:  The patient was depressed and overwhelmed with anxiety and worries about her mental condition and was particularly focused on her facial acne today and asked for Accutane treatment. I explained to her that a dermatologist will have to be the one to prescribe that medicine. She said she feels sleepy throughout the day. She did not have any significant side effects from the new medicine. The suicidal ideations continue. There is no identifiable safe alternative other than continued hospitalization. Charting and medications reviewed.       OBJECTIVE    Physical  BP (!) 104/52   Pulse 76   Temp 98.1 °F (36.7 °C)   Resp 14   Ht 5' 7\" (1.702 m)   Wt 205 lb (93 kg)   LMP 10/11/2018   SpO2 97%   BMI 32.11 kg/m²      Mental Status Evaluation:  Orientation: alertness: alert   Mood:. anxious and depressed      Affect:  constricted      Appearance:  age appropriate   Activity:  Within Normal Limits   Gait/Posture: Normal   Speech:  normal pitch and normal volume   Thought Process:  within normal limits   Thought Content:  suicidal   Sensorium:  person, place and time/date   Cognition:  grossly intact   Memory: intact   Insight:  fair   Judgment: fair   Suicidal Ideations: active   Homicidal Ideations: Negative for homicidal ideation      Medication Side Effects: absent       Attention Span attention span and concentration were age appropriate     Medications  Current Facility-Administered Medications   Medication Dose Route Frequency Provider Last Rate Last Dose    vitamin D (CHOLECALCIFEROL) tablet 2,000 Units  2,000 Units Oral Daily Noam Lopez MD   2,000 Units at 10/17/18 1339    VORTIoxetine (TRINTELLIX) tablet 10 mg  10 mg Oral Daily with breakfast Noam Lopez MD   10 mg at 10/17/18 0826    lactobacillus (CULTURELLE) capsule 2 side effects so far. · Engage her in unit milieu and group psychotherapy, safety and discharge plan      [employer] voice recognition software used in portions of this document.

## 2018-10-17 NOTE — CARE COORDINATION
to Ms. Nataliia [email or web address] [email or web address] to review information regarding program  Email sent on 10/16/2018   Meet with a counselor at [address] [facility] to change into the [facility] in Social Work Provide print out to [facility] on all transfer information, class requirements, etc. [facility] to review and appointment to be set-up at a later date   Acne Medicine Provide information on medication called Accutane and speak to Dr. Kraig Martinez to review information printed and  to discuss with doctor about starting her on treatment.

## 2018-10-17 NOTE — BH NOTE
PSYCHOEDUCATION GROUP NOTE       Date: 10/17/2018                 Start Time:     1600                    End Time: [id number]      Number Participants in Group: 10      Name of group: coping skills        RT  SW x Nsg  LPN   BHTII  Other       Participation Level:     None  Minimal   x Active Listener  Interactive    Monopolizing         Participation Quality:   Appropriate  Inappropriate   x  Attentive   Intrusive     Sharing   Resistant     Supportive    Lethargic       Affective:    Congruent  Incongruent  Blunted x Flat    Constricted  Anxious  Elated  Angry    Labile  Depressed  Other         Cognitive:  x Alert x Oriented PPTS     Concentration G  F x P    Attention Span G  F x P    Short-Term Memory G  F x P    Long-Term Memory G  F x P    ProblemSolving/  Decision Making G  F x P    Ability to Process  Information G  F x P       Contributing Factors             Delusional             Hallucinating             Flight of Ideas             Other: poor concentration       Modes of Intervention:  x Education  Support  Exploration    Clarifying  Problem Solving  Confrontation   x Socialization  Limit Setting  Reality Testing    Activity  Movement  Media    Other:          Response to Learning:  x Able to verbalize current knowledge/experience   x Able to verbalize/acknowledge new learning   x Able to retain information    Capable of insight    Able to change behavior    Progressing to goal    Other:        Comments:

## 2018-10-18 PROCEDURE — 1240000000 HC EMOTIONAL WELLNESS R&B

## 2018-10-18 PROCEDURE — 99232 SBSQ HOSP IP/OBS MODERATE 35: CPT | Performed by: PSYCHIATRY & NEUROLOGY

## 2018-10-18 PROCEDURE — 6370000000 HC RX 637 (ALT 250 FOR IP): Performed by: NURSE PRACTITIONER

## 2018-10-18 PROCEDURE — 6370000000 HC RX 637 (ALT 250 FOR IP): Performed by: PSYCHIATRY & NEUROLOGY

## 2018-10-18 PROCEDURE — 6370000000 HC RX 637 (ALT 250 FOR IP): Performed by: INTERNAL MEDICINE

## 2018-10-18 RX ORDER — DEXTROAMPHETAMINE SACCHARATE, AMPHETAMINE ASPARTATE, DEXTROAMPHETAMINE SULFATE AND AMPHETAMINE SULFATE 2.5; 2.5; 2.5; 2.5 MG/1; MG/1; MG/1; MG/1
10 TABLET ORAL DAILY
Status: DISCONTINUED | OUTPATIENT
Start: 2018-10-18 | End: 2018-10-19

## 2018-10-18 RX ORDER — BUSPIRONE HYDROCHLORIDE 15 MG/1
7.5 TABLET ORAL 2 TIMES DAILY
Status: DISCONTINUED | OUTPATIENT
Start: 2018-10-18 | End: 2018-10-19

## 2018-10-18 RX ADMIN — CEFUROXIME AXETIL 250 MG: 250 TABLET ORAL at 20:56

## 2018-10-18 RX ADMIN — Medication 2 CAPSULE: at 08:41

## 2018-10-18 RX ADMIN — ALPRAZOLAM 0.25 MG: 0.25 TABLET ORAL at 20:56

## 2018-10-18 RX ADMIN — CEFUROXIME AXETIL 250 MG: 250 TABLET ORAL at 08:40

## 2018-10-18 RX ADMIN — BUSPIRONE HYDROCHLORIDE 7.5 MG: 15 TABLET ORAL at 20:58

## 2018-10-18 RX ADMIN — VORTIOXETINE 10 MG: 10 TABLET, FILM COATED ORAL at 08:40

## 2018-10-18 RX ADMIN — VITAMIN D, TAB 1000IU (100/BT) 2000 UNITS: 25 TAB at 09:11

## 2018-10-18 RX ADMIN — HYDROXYZINE HYDROCHLORIDE 50 MG: 25 TABLET, FILM COATED ORAL at 20:56

## 2018-10-18 RX ADMIN — Medication 2 CAPSULE: at 17:35

## 2018-10-18 NOTE — PROGRESS NOTES
gave informed consent. · Restart buspirone 10 mg twice a day for anxiety. · Started vitamin D treatment with daily supplements. · continue Trintellix 10 mg daily with breakfast.  No side effects so far. · Engage her in unit milieu and group psychotherapy, safety and discharge plan      [employer] voice recognition software used in portions of this document.

## 2018-10-19 PROCEDURE — 6370000000 HC RX 637 (ALT 250 FOR IP): Performed by: NURSE PRACTITIONER

## 2018-10-19 PROCEDURE — 6370000000 HC RX 637 (ALT 250 FOR IP): Performed by: PSYCHIATRY & NEUROLOGY

## 2018-10-19 PROCEDURE — 6370000000 HC RX 637 (ALT 250 FOR IP): Performed by: INTERNAL MEDICINE

## 2018-10-19 PROCEDURE — 1240000000 HC EMOTIONAL WELLNESS R&B

## 2018-10-19 PROCEDURE — 99232 SBSQ HOSP IP/OBS MODERATE 35: CPT | Performed by: PSYCHIATRY & NEUROLOGY

## 2018-10-19 RX ORDER — DEXTROAMPHETAMINE SACCHARATE, AMPHETAMINE ASPARTATE, DEXTROAMPHETAMINE SULFATE AND AMPHETAMINE SULFATE 2.5; 2.5; 2.5; 2.5 MG/1; MG/1; MG/1; MG/1
15 TABLET ORAL DAILY
Status: DISCONTINUED | OUTPATIENT
Start: 2018-10-20 | End: 2018-10-20

## 2018-10-19 RX ORDER — BUSPIRONE HYDROCHLORIDE 10 MG/1
10 TABLET ORAL 2 TIMES DAILY
Status: DISCONTINUED | OUTPATIENT
Start: 2018-10-19 | End: 2018-10-22

## 2018-10-19 RX ADMIN — BUSPIRONE HYDROCHLORIDE 10 MG: 10 TABLET ORAL at 20:31

## 2018-10-19 RX ADMIN — VORTIOXETINE 10 MG: 10 TABLET, FILM COATED ORAL at 10:17

## 2018-10-19 RX ADMIN — CEFUROXIME AXETIL 250 MG: 250 TABLET ORAL at 20:33

## 2018-10-19 RX ADMIN — BUSPIRONE HYDROCHLORIDE 7.5 MG: 15 TABLET ORAL at 10:22

## 2018-10-19 RX ADMIN — ALPRAZOLAM 0.25 MG: 0.25 TABLET ORAL at 20:32

## 2018-10-19 RX ADMIN — Medication 2 CAPSULE: at 18:12

## 2018-10-19 RX ADMIN — HYDROXYZINE HYDROCHLORIDE 50 MG: 25 TABLET, FILM COATED ORAL at 20:31

## 2018-10-19 RX ADMIN — CEFUROXIME AXETIL 250 MG: 250 TABLET ORAL at 10:17

## 2018-10-19 RX ADMIN — Medication 2 CAPSULE: at 10:23

## 2018-10-19 RX ADMIN — VITAMIN D, TAB 1000IU (100/BT) 2000 UNITS: 25 TAB at 10:23

## 2018-10-19 RX ADMIN — DEXTROAMPHETAMINE SACCHARATE, AMPHETAMINE ASPARTATE, DEXTROAMPHETAMINE SULFATE AND AMPHETAMINE SULFATE 10 MG: 2.5; 2.5; 2.5; 2.5 TABLET ORAL at 10:16

## 2018-10-19 NOTE — BH NOTE
Psychoeducation Group Note     Date: 10/19/18                        Start Time: 845am                 End Time: 945am     Number Participants in Group:  14     Goal:  Patient will demonstrate increased interpersonal interaction   Topic: community meeting/goals group and recovery discussion     Discipline Responsible:    OT   AT   [facility]. x RT   Other         Participation Level:                   None  Minimal   x Active Listener  x Interactive    X Monopolizing             Participation Quality:    Appropriate   Inappropriate   x       Attentive         Intrusive   x       Sharing         Resistant           Supportive         Lethargic         Affective:            Congruent   Incongruent  Blunted   Flat     Constricted   Anxious   Elated   Angry     Labile   Depressed  X IRRITABLE            Cognitive:  x Alert  x Oriented PPTP      Concentration   G  X F  P   Attention Span   G  X F  P   Short-Term Memory   G   F   P   Long-Term Memory   G   F   P   ProblemSolving/  Decision Making   G   F  LIMITED: PT STATES \"DAY STAFF DON'T CARE, GET MY MORNING MEDS AT 2PM\". PT REDIRECTED AFTER GROUP R/TO REALITY OF ACTUAL TIMING OF MEDS YESTERDAY AND NURSE WAITING FOR PT TO GET OUT OF GROUP TODAY . CONCERNS ALSO PASSED ON TO CHARGE NURSE. Ability to Process  Information   G  F  X LIMITED: PT IS REPETITIVE AND FOCUSES ON EMOTIONS RATHER THAN FACTS DURING CONVERSATION        Contributing Factors              Delusional              Hallucinating              Flight of Ideas              Other:PT IDENTIFIES ANXIETY IS DECREASING BUT C/O FEELING SLEEPY DURING DAY.  DOCTOR IS ADDRESSING THIS WITH PT AND PT ENCOURAGED TO CONTINUE COMMUNICATING WITH DOCTOR TODAY         Modes of Intervention:  x Education x Support x Exploration   x Clarifying x Problem Solving   Confrontation     Socialization   Limit Setting   Reality Testing   x Activity   Movement   Media     Other:                  Response to Learning:  x Able to verbalize current knowledge/experience    Able to verbalize/acknowledge new learning    Able to retain information     Capable of insight     Able to change behavior   x Progressing to goal     Other         Comments:

## 2018-10-19 NOTE — BH NOTE
Psychoeducation Group Note   Date: 10/19/18  Start Time: 1600 End Time:  1700  Number Participants in Group:  11  Goal: Patient will demonstrate increased interpersonal interaction   Topic:  Family Relationships  Discipline Responsible:    OT   AT     x Nsg.   RT   BT    Participation Level:    None   Minimal     x Active Listener   x Interactive     Monopolizing      Participation Quality:   x Appropriate   Inappropriate     Attentive   Intrusive      Sharing   Resistant     Supportive   Lethargic    Affective:     Congruent   Incongruent   Blunted   Flat     Constricted   Anxious   Elated   Angry     Labile   Depressed   Other      Cognitive:   x Alert   Oriented PPTP      Concentration   G   F   P    Attention Span    G   F   P    Short-Term Memory    G   F   P    Long-Term Memory   G   F   P    ProblemSolving/   Decision Making   G   F   P    Ability to Process   Information   G   F   P       Contributing Factors     Delusional     Hallucinating     Flight of Ideas     Other:    Modes of Intervention:    x Education   Support  x Exploration    x Clarifying  x Problem Solving   Confrontation    x Socialization   Limit Setting   Reality Testing     Activity   Movement   Media     Other:        Response to Learning:    x Able to verbalize current knowledge/experience     Able to verbalize/acknowledge new learning     Able to retain information     Capable of insight     Able to change behavior     Progressing to goal     Other:    Comments:

## 2018-10-20 PROCEDURE — 99232 SBSQ HOSP IP/OBS MODERATE 35: CPT | Performed by: PSYCHIATRY & NEUROLOGY

## 2018-10-20 PROCEDURE — 6370000000 HC RX 637 (ALT 250 FOR IP): Performed by: PSYCHIATRY & NEUROLOGY

## 2018-10-20 PROCEDURE — 6370000000 HC RX 637 (ALT 250 FOR IP): Performed by: NURSE PRACTITIONER

## 2018-10-20 PROCEDURE — 6370000000 HC RX 637 (ALT 250 FOR IP): Performed by: INTERNAL MEDICINE

## 2018-10-20 PROCEDURE — 1240000000 HC EMOTIONAL WELLNESS R&B

## 2018-10-20 RX ORDER — ALPRAZOLAM 0.25 MG/1
0.25 TABLET ORAL 2 TIMES DAILY PRN
Status: DISCONTINUED | OUTPATIENT
Start: 2018-10-20 | End: 2018-10-23 | Stop reason: HOSPADM

## 2018-10-20 RX ORDER — DEXTROAMPHETAMINE SACCHARATE, AMPHETAMINE ASPARTATE, DEXTROAMPHETAMINE SULFATE AND AMPHETAMINE SULFATE 2.5; 2.5; 2.5; 2.5 MG/1; MG/1; MG/1; MG/1
10 TABLET ORAL DAILY
Status: DISCONTINUED | OUTPATIENT
Start: 2018-10-21 | End: 2018-10-23 | Stop reason: HOSPADM

## 2018-10-20 RX ADMIN — HYDROXYZINE HYDROCHLORIDE 50 MG: 25 TABLET, FILM COATED ORAL at 17:46

## 2018-10-20 RX ADMIN — HYDROXYZINE HYDROCHLORIDE 50 MG: 25 TABLET, FILM COATED ORAL at 11:02

## 2018-10-20 RX ADMIN — Medication 2 CAPSULE: at 08:45

## 2018-10-20 RX ADMIN — BUSPIRONE HYDROCHLORIDE 10 MG: 10 TABLET ORAL at 08:46

## 2018-10-20 RX ADMIN — DEXTROAMPHETAMINE SACCHARATE, AMPHETAMINE ASPARTATE, DEXTROAMPHETAMINE SULFATE AND AMPHETAMINE SULFATE 15 MG: 2.5; 2.5; 2.5; 2.5 TABLET ORAL at 08:46

## 2018-10-20 RX ADMIN — VITAMIN D, TAB 1000IU (100/BT) 2000 UNITS: 25 TAB at 08:45

## 2018-10-20 RX ADMIN — VORTIOXETINE 10 MG: 10 TABLET, FILM COATED ORAL at 08:46

## 2018-10-20 RX ADMIN — CEFUROXIME AXETIL 250 MG: 250 TABLET ORAL at 20:48

## 2018-10-20 RX ADMIN — Medication 2 CAPSULE: at 17:40

## 2018-10-20 RX ADMIN — BUSPIRONE HYDROCHLORIDE 10 MG: 10 TABLET ORAL at 20:48

## 2018-10-20 RX ADMIN — CEFUROXIME AXETIL 250 MG: 250 TABLET ORAL at 08:48

## 2018-10-20 RX ADMIN — ALPRAZOLAM 0.25 MG: 0.25 TABLET ORAL at 10:40

## 2018-10-20 NOTE — PROGRESS NOTES
Department of Psychiatry  Attending Progress Note  Chief Complaint: Major depressive disorder, recurrent episode, severe with anxious distress ([address]     SUBJECTIVE:  She is still quite depressed, despondent, overwhelmed, and having frequent suicidal thoughts. Affect remains flat and poorly reactive. Patient has been withdrawn and isolative. Patient complains of high level of racing thoughts driving feelings of anxiety. Feeling hopeless and helpless. Denies any side effects to medications. Explored her  concerns and support provided. There is no identifiable safe alternative other than continued hospitalization. Charting and medications reviewed.         OBJECTIVE    Physical  /73   Pulse 117   Temp 98.4 °F (36.9 °C) (Oral)   Resp 14   Ht 5' 7\" (1.702 m)   Wt 205 lb (93 kg)   LMP 10/11/2018   SpO2 97%   BMI 32.11 kg/m²      Mental Status Evaluation:  Orientation: alertness: alert   Mood:. anxious and depressed      Affect:  constricted      Appearance:  age appropriate   Activity:  Within Normal Limits   Gait/Posture: Normal   Speech:  normal pitch and normal volume   Thought Process:  within normal limits   Thought Content:  suicidal   Sensorium:  person, place and time/date   Cognition:  grossly intact   Memory: intact   Insight:  fair   Judgment: fair   Suicidal Ideations: active   Homicidal Ideations: Negative for homicidal ideation      Medication Side Effects: absent       Attention Span attention span and concentration were age appropriate     Medications  Current Facility-Administered Medications   Medication Dose Route Frequency Provider Last Rate Last Dose    [START ON 10/21/2018] VORTIoxetine (TRINTELLIX) tablet 15 mg  15 mg Oral Daily with breakfast Genevieve Birch MD        [START ON 10/21/2018] amphetamine-dextroamphetamine (ADDERALL) tablet 10 mg  10 mg Oral Daily Fritz Bernstein MD        ALPRAZoarlin White) tablet 0.25 mg  0.25 mg Oral BID PRN Genevieve Birch MD        busPIRone

## 2018-10-20 NOTE — PROGRESS NOTES
Department of Psychiatry  Attending Progress Note  Chief Complaint: Major depressive disorder, recurrent episode, severe with anxious distress ([address]     SUBJECTIVE:  The patient continues to feel depressed and anxious. She attributes that to feeling tired and sleepy all the time,   She did not notice a significant change with recent medication changes. She did not have any significant side effects from the new medicine. The suicidal ideations  are less frequent and less intense but she is unable to function with her current mental status   There is no identifiable safe alternative other than continued hospitalization. Charting and medications reviewed.       OBJECTIVE    Physical  /76   Pulse 113   Temp 98.4 °F (36.9 °C) (Oral)   Resp 14   Ht 5' 7\" (1.702 m)   Wt 205 lb (93 kg)   LMP 10/11/2018   SpO2 97%   BMI 32.11 kg/m²      Mental Status Evaluation:  Orientation: alertness: alert   Mood:. anxious and depressed      Affect:  constricted      Appearance:  age appropriate   Activity:  Within Normal Limits   Gait/Posture: Normal   Speech:  normal pitch and normal volume   Thought Process:  within normal limits   Thought Content:  suicidal   Sensorium:  person, place and time/date   Cognition:  grossly intact   Memory: intact   Insight:  fair   Judgment: fair   Suicidal Ideations: active   Homicidal Ideations: Negative for homicidal ideation      Medication Side Effects: absent       Attention Span attention span and concentration were age appropriate     Medications  Current Facility-Administered Medications   Medication Dose Route Frequency Provider Last Rate Last Dose    busPIRone (BUSPAR) tablet 10 mg  10 mg Oral BID Lieutenant Charlee MD   10 mg at 10/19/18 2031    amphetamine-dextroamphetamine (ADDERALL) tablet 15 mg  15 mg Oral Daily Lieutenant Charlee MD        vitamin D (CHOLECALCIFEROL) tablet 2,000 Units  2,000 Units Oral Daily Lieutenant Charlee MD   2,000 Units at 10/19/18 1023  VORTIoxetine (TRINTELLIX) tablet 10 mg  10 mg Oral Daily with breakfast Vandana Walsh MD   10 mg at 10/19/18 1017    lactobacillus (CULTURELLE) capsule 2 capsule  2 capsule Oral BID  Jazmin Samuel MD   2 capsule at 10/19/18 1812    cefUROXime (CEFTIN) tablet 250 mg  250 mg Oral 2 times per day Jazmin Samuel MD   250 mg at 10/19/18 2033    dicyclomine (BENTYL) capsule 10 mg  10 mg Oral Q6H PRN Vandana Walsh MD        docusate sodium (COLACE) capsule 100 mg  100 mg Oral Daily Vandana Walsh MD   100 mg at 10/13/18 0925    norethindrone-ethinyl estradiol (ORTHO-NOVUM 1-35 TAB;NORTREL 1-35 TAB) 1-35 MG-MCG per tablet 1 tablet  1 tablet Oral Daily Vandana Walsh MD   1 tablet at 10/19/18 1018    acetaminophen (TYLENOL) tablet 650 mg  650 mg Oral Q4H PRN Karina New York, APRN - CNP   650 mg at 10/13/18 2125    hydrOXYzine (ATARAX) tablet 50 mg  50 mg Oral TID PRN Karina [place] York, APRN - CNP   50 mg at 10/19/18 2031    LORazepam (ATIVAN) injection 1 mg  1 mg Intramuscular Q4H PRN Karina [place], APRN - CNP        traZODone (DESYREL) tablet 50 mg  50 mg Oral Nightly PRN Karina [place], APRN - CNP   50 mg at 10/12/18 2101    magnesium hydroxide (MILK OF MAGNESIA) 400 MG/5ML suspension 30 mL  30 mL Oral Daily PRN Karina New York, APRN - CNP        ALPRAZolam Wileen Ao) tablet 0.25 mg  0.25 mg Oral 4x Daily PRN Karina New York, APRN - CNP   0.25 mg at 10/19/18 2032         busPIRone  10 mg Oral BID    amphetamine-dextroamphetamine  15 mg Oral Daily    vitamin D  2,000 Units Oral Daily    VORTIoxetine  10 mg Oral Daily with breakfast    lactobacillus  2 capsule Oral BID     cefUROXime  250 mg Oral 2 times per day    docusate sodium  100 mg Oral Daily    norethindrone-ethinyl estradiol  1 tablet Oral Daily       ASSESSMENT  Major depressive disorder, recurrent episode, severe with anxious distress ([facility] [address]     Patient's Response to Treatment:

## 2018-10-21 PROCEDURE — 6370000000 HC RX 637 (ALT 250 FOR IP): Performed by: PSYCHIATRY & NEUROLOGY

## 2018-10-21 PROCEDURE — 1240000000 HC EMOTIONAL WELLNESS R&B

## 2018-10-21 PROCEDURE — 6370000000 HC RX 637 (ALT 250 FOR IP): Performed by: INTERNAL MEDICINE

## 2018-10-21 PROCEDURE — 6370000000 HC RX 637 (ALT 250 FOR IP): Performed by: NURSE PRACTITIONER

## 2018-10-21 RX ADMIN — HYDROXYZINE HYDROCHLORIDE 50 MG: 25 TABLET, FILM COATED ORAL at 21:38

## 2018-10-21 RX ADMIN — Medication 2 CAPSULE: at 17:36

## 2018-10-21 RX ADMIN — VORTIOXETINE 15 MG: 10 TABLET, FILM COATED ORAL at 08:19

## 2018-10-21 RX ADMIN — BUSPIRONE HYDROCHLORIDE 10 MG: 10 TABLET ORAL at 21:38

## 2018-10-21 RX ADMIN — CEFUROXIME AXETIL 250 MG: 250 TABLET ORAL at 21:38

## 2018-10-21 RX ADMIN — ALPRAZOLAM 0.25 MG: 0.25 TABLET ORAL at 21:38

## 2018-10-21 RX ADMIN — HYDROXYZINE HYDROCHLORIDE 50 MG: 25 TABLET, FILM COATED ORAL at 12:01

## 2018-10-21 RX ADMIN — VITAMIN D, TAB 1000IU (100/BT) 2000 UNITS: 25 TAB at 08:19

## 2018-10-21 RX ADMIN — BUSPIRONE HYDROCHLORIDE 10 MG: 10 TABLET ORAL at 08:18

## 2018-10-21 RX ADMIN — CEFUROXIME AXETIL 250 MG: 250 TABLET ORAL at 08:19

## 2018-10-21 RX ADMIN — ALPRAZOLAM 0.25 MG: 0.25 TABLET ORAL at 09:37

## 2018-10-21 RX ADMIN — HYDROXYZINE HYDROCHLORIDE 50 MG: 25 TABLET, FILM COATED ORAL at 01:35

## 2018-10-21 RX ADMIN — Medication 2 CAPSULE: at 08:18

## 2018-10-21 NOTE — BH NOTE
Psychoeducation Group Note    Date: 10/21/18  Start Time: 1100  End Time: 1120    Number Participants in Group:  24/24    Goal:  Patient will demonstrate increased interpersonal interaction   Topic: Safety Drill- Room checks    Discipline Responsible:   OT  AT   X Nsg.  RT  Other       Participation Level:     None  Minimal   X Active Listener  Interactive    Monopolizing         Participation Quality:  X Appropriate  Inappropriate          Attentive        Intrusive          Sharing        Resistant          Supportive        Lethargic       Affective:   X Congruent  Incongruent  Blunted  Flat    Constricted  Anxious  Elated  Angry    Labile  Depressed  Other         Cognitive:  X Alert  Oriented PPTP     Concentration  G  F  P   Attention Span  G  F  P   Short-Term Memory  G  F  P   Long-Term Memory  G  F  P   ProblemSolving/  Decision Making  G  F  P   Ability to Process  Information  G  F  P      Contributing Factors             Delusional             Hallucinating             Flight of Ideas             Other:       Modes of Intervention:   Education  Support  Exploration    Clarifying  Problem Solving  Confrontation    Socialization  Limit Setting  Reality Testing    Activity  Movement  Media   X Other: Safety checks of rooms and unit             Response to Learning:   Able to verbalize current knowledge/experience    Able to verbalize/acknowledge new learning    Able to retain information    Capable of insight    Able to change behavior    Progressing to goal    Other:        Comments: All areas of unit checked. Food and trash removed from several rooms. Pt. Encouraged to verbalize and safety concerns.

## 2018-10-22 PROCEDURE — 6370000000 HC RX 637 (ALT 250 FOR IP): Performed by: PSYCHIATRY & NEUROLOGY

## 2018-10-22 PROCEDURE — 6370000000 HC RX 637 (ALT 250 FOR IP): Performed by: NURSE PRACTITIONER

## 2018-10-22 PROCEDURE — 6370000000 HC RX 637 (ALT 250 FOR IP): Performed by: INTERNAL MEDICINE

## 2018-10-22 PROCEDURE — 1240000000 HC EMOTIONAL WELLNESS R&B

## 2018-10-22 PROCEDURE — 6370000000 HC RX 637 (ALT 250 FOR IP)

## 2018-10-22 PROCEDURE — 99232 SBSQ HOSP IP/OBS MODERATE 35: CPT | Performed by: NURSE PRACTITIONER

## 2018-10-22 RX ORDER — BUSPIRONE HYDROCHLORIDE 10 MG/1
10 TABLET ORAL 3 TIMES DAILY
Status: DISCONTINUED | OUTPATIENT
Start: 2018-10-22 | End: 2018-10-23 | Stop reason: HOSPADM

## 2018-10-22 RX ORDER — LACTOBACILLUS RHAMNOSUS GG 10B CELL
CAPSULE ORAL
Status: COMPLETED
Start: 2018-10-22 | End: 2018-10-22

## 2018-10-22 RX ORDER — BUSPIRONE HYDROCHLORIDE 10 MG/1
10 TABLET ORAL 3 TIMES DAILY
Status: DISCONTINUED | OUTPATIENT
Start: 2018-10-22 | End: 2018-10-22

## 2018-10-22 RX ADMIN — BUSPIRONE HYDROCHLORIDE 10 MG: 10 TABLET ORAL at 09:10

## 2018-10-22 RX ADMIN — VORTIOXETINE 15 MG: 10 TABLET, FILM COATED ORAL at 09:10

## 2018-10-22 RX ADMIN — ALPRAZOLAM 0.25 MG: 0.25 TABLET ORAL at 21:08

## 2018-10-22 RX ADMIN — Medication 2 CAPSULE: at 09:10

## 2018-10-22 RX ADMIN — BUSPIRONE HYDROCHLORIDE 10 MG: 10 TABLET ORAL at 15:24

## 2018-10-22 RX ADMIN — Medication 2 CAPSULE: at 18:37

## 2018-10-22 RX ADMIN — CEFUROXIME AXETIL 250 MG: 250 TABLET ORAL at 21:08

## 2018-10-22 RX ADMIN — HYDROXYZINE HYDROCHLORIDE 50 MG: 25 TABLET, FILM COATED ORAL at 21:08

## 2018-10-22 RX ADMIN — BUSPIRONE HYDROCHLORIDE 10 MG: 10 TABLET ORAL at 21:08

## 2018-10-22 RX ADMIN — CEFUROXIME AXETIL 250 MG: 250 TABLET ORAL at 09:10

## 2018-10-22 RX ADMIN — VITAMIN D, TAB 1000IU (100/BT) 2000 UNITS: 25 TAB at 09:10

## 2018-10-23 VITALS
SYSTOLIC BLOOD PRESSURE: 99 MMHG | TEMPERATURE: 98.1 F | HEART RATE: 91 BPM | BODY MASS INDEX: 32.18 KG/M2 | HEIGHT: 67 IN | OXYGEN SATURATION: 97 % | WEIGHT: 205 LBS | RESPIRATION RATE: 15 BRPM | DIASTOLIC BLOOD PRESSURE: 65 MMHG

## 2018-10-23 PROBLEM — R45.851 SUICIDAL IDEATION: Status: RESOLVED | Noted: 2018-10-11 | Resolved: 2018-10-23

## 2018-10-23 PROCEDURE — 5130000000 HC BRIDGE APPOINTMENT

## 2018-10-23 PROCEDURE — 6370000000 HC RX 637 (ALT 250 FOR IP): Performed by: PSYCHIATRY & NEUROLOGY

## 2018-10-23 PROCEDURE — 99238 HOSP IP/OBS DSCHRG MGMT 30/<: CPT | Performed by: NURSE PRACTITIONER

## 2018-10-23 PROCEDURE — 6370000000 HC RX 637 (ALT 250 FOR IP): Performed by: INTERNAL MEDICINE

## 2018-10-23 PROCEDURE — 6370000000 HC RX 637 (ALT 250 FOR IP): Performed by: NURSE PRACTITIONER

## 2018-10-23 RX ORDER — HYDROXYZINE 50 MG/1
50 TABLET, FILM COATED ORAL 3 TIMES DAILY PRN
Qty: 14 TABLET | Refills: 0 | Status: ON HOLD | OUTPATIENT
Start: 2018-10-23 | End: 2018-11-07 | Stop reason: HOSPADM

## 2018-10-23 RX ORDER — BUSPIRONE HYDROCHLORIDE 10 MG/1
10 TABLET ORAL 3 TIMES DAILY
Qty: 14 TABLET | Refills: 0 | Status: ON HOLD | OUTPATIENT
Start: 2018-10-23 | End: 2018-11-07 | Stop reason: HOSPADM

## 2018-10-23 RX ORDER — TRAZODONE HYDROCHLORIDE 50 MG/1
50 TABLET ORAL NIGHTLY PRN
Qty: 14 TABLET | Refills: 0 | Status: ON HOLD | OUTPATIENT
Start: 2018-10-23 | End: 2018-11-07 | Stop reason: HOSPADM

## 2018-10-23 RX ADMIN — VITAMIN D, TAB 1000IU (100/BT) 2000 UNITS: 25 TAB at 08:57

## 2018-10-23 RX ADMIN — DEXTROAMPHETAMINE SACCHARATE, AMPHETAMINE ASPARTATE, DEXTROAMPHETAMINE SULFATE AND AMPHETAMINE SULFATE 10 MG: 2.5; 2.5; 2.5; 2.5 TABLET ORAL at 08:57

## 2018-10-23 RX ADMIN — DOCUSATE SODIUM 100 MG: 100 CAPSULE, LIQUID FILLED ORAL at 08:57

## 2018-10-23 RX ADMIN — CEFUROXIME AXETIL 250 MG: 250 TABLET ORAL at 08:57

## 2018-10-23 RX ADMIN — BUSPIRONE HYDROCHLORIDE 10 MG: 10 TABLET ORAL at 08:57

## 2018-10-23 RX ADMIN — Medication 2 CAPSULE: at 08:57

## 2018-10-23 RX ADMIN — VORTIOXETINE 15 MG: 10 TABLET, FILM COATED ORAL at 08:57

## 2018-10-23 RX ADMIN — DICYCLOMINE HYDROCHLORIDE 10 MG: 10 CAPSULE ORAL at 10:04

## 2018-10-23 RX ADMIN — BUSPIRONE HYDROCHLORIDE 10 MG: 10 TABLET ORAL at 13:41

## 2018-10-23 NOTE — CARE COORDINATION
Bridge Appointment completed: Reviewed Discharge Instructions with patient. Patient verbalizes understanding and agreement with the discharge plan using the teachback method.        Discharge Arrangements: Rosa Johnson 10/29 5:30 PM, Ariana 10/30 2:30 PM    Guardian notified: N/A  Discharge destination/address: Pt returning to family home  Transported by:  family

## 2018-10-23 NOTE — BH NOTE
Psychoeducation Group Note    Date: 10/23/18   Start Time: 1430  End Time: 1515    Number Participants in Group:  5    Goal:  Patient will demonstrate increased interpersonal interaction   Topic: cognitive skills group: communication et decision making    Discipline Responsible:   OT  AT  [facility]. x RT  Other       Participation Level:     None  Minimal   x Active Listener x Interactive    Monopolizing         Participation Quality:   Appropriate  Inappropriate   x       Attentive        Intrusive   x       Sharing        Resistant   x       Supportive        Lethargic       Affective:   x Congruent  Incongruent  Blunted  Flat    Constricted  Anxious  Elated  Angry    Labile  Depressed  Other x bright       Cognitive:  x Alert x Oriented PPTP     Concentration x G  F  P   Attention Span x G  F  P   Short-Term Memory  G  F  P   Long-Term Memory  G  F  P   ProblemSolving/  Decision Making  G x F  P   Ability to Process  Information x G  F  P      Contributing Factors             Delusional             Hallucinating             Flight of Ideas             Other:       Modes of Intervention:  x Education x Support x Exploration   x Clarifying x Problem Solving  Confrontation   x Socialization  Limit Setting  Reality Testing   x Activity  Movement  Media    Other:            Response to Learning:  x Able to verbalize current knowledge/experience   x Able to verbalize/acknowledge new learning   x Able to retain information    Capable of insight    Able to change behavior   x Progressing to goal    Other:        Comments: RT talked with pt during group and after, r/t positive coping skills using creativity and recreational tools, as well as  [employer]. RT also discussed ways to self monitor mood and symptoms and pt agreed to work on communicating more with supports to prevent symptoms becoming so intense in future.

## 2018-10-25 ENCOUNTER — HOSPITAL ENCOUNTER (INPATIENT)
Age: 23
LOS: 13 days | Discharge: HOME | DRG: 885 | End: 2018-11-07
Attending: EMERGENCY MEDICINE | Admitting: PSYCHIATRY & NEUROLOGY
Payer: COMMERCIAL

## 2018-10-25 DIAGNOSIS — F33.2 MAJOR DEPRESSIVE DISORDER, RECURRENT EPISODE, SEVERE WITH ANXIOUS DISTRESS (HCC): Chronic | ICD-10-CM

## 2018-10-25 DIAGNOSIS — R45.851 SUICIDAL IDEATIONS: Primary | ICD-10-CM

## 2018-10-25 PROBLEM — F33.9 MAJOR DEPRESSIVE DISORDER, RECURRENT (HCC): Status: ACTIVE | Noted: 2018-10-25

## 2018-10-25 LAB
ABSOLUTE EOS #: 0.1 K/UL (ref 0–0.4)
ABSOLUTE IMMATURE GRANULOCYTE: ABNORMAL K/UL (ref 0–0.3)
ABSOLUTE LYMPH #: 1.3 K/UL (ref 1–4.8)
ABSOLUTE MONO #: 0.5 K/UL (ref 0.1–1.3)
ALBUMIN SERPL-MCNC: 4.2 G/DL (ref 3.5–5.2)
ALBUMIN/GLOBULIN RATIO: ABNORMAL (ref 1–2.5)
ALP BLD-CCNC: 82 U/L (ref 35–104)
ALT SERPL-CCNC: 17 U/L (ref 5–33)
AMPHETAMINE SCREEN URINE: NEGATIVE
ANION GAP SERPL CALCULATED.3IONS-SCNC: 14 MMOL/L (ref 9–17)
AST SERPL-CCNC: 16 U/L
BARBITURATE SCREEN URINE: NEGATIVE
BASOPHILS # BLD: 0 % (ref 0–2)
BASOPHILS ABSOLUTE: 0 K/UL (ref 0–0.2)
BENZODIAZEPINE SCREEN, URINE: POSITIVE
BILIRUB SERPL-MCNC: 0.65 MG/DL (ref 0.3–1.2)
BUN BLDV-MCNC: 9 MG/DL (ref 6–20)
BUN/CREAT BLD: ABNORMAL (ref 9–20)
BUPRENORPHINE URINE: ABNORMAL
CALCIUM SERPL-MCNC: 9.2 MG/DL (ref 8.6–10.4)
CANNABINOID SCREEN URINE: NEGATIVE
CHLORIDE BLD-SCNC: 105 MMOL/L (ref 98–107)
CO2: 19 MMOL/L (ref 20–31)
COCAINE METABOLITE, URINE: NEGATIVE
CREAT SERPL-MCNC: 0.72 MG/DL (ref 0.5–0.9)
DIFFERENTIAL TYPE: ABNORMAL
EOSINOPHILS RELATIVE PERCENT: 1 % (ref 0–4)
ETHANOL PERCENT: <0.01 %
ETHANOL: <10 MG/DL
GFR AFRICAN AMERICAN: >60 ML/MIN
GFR NON-AFRICAN AMERICAN: >60 ML/MIN
GFR SERPL CREATININE-BSD FRML MDRD: ABNORMAL ML/MIN/{1.73_M2}
GFR SERPL CREATININE-BSD FRML MDRD: ABNORMAL ML/MIN/{1.73_M2}
GLUCOSE BLD-MCNC: 95 MG/DL (ref 70–99)
HCG QUALITATIVE: NEGATIVE
HCT VFR BLD CALC: 43.4 % (ref 36–46)
HEMOGLOBIN: 14.5 G/DL (ref 12–16)
IMMATURE GRANULOCYTES: ABNORMAL %
LIPASE: 41 U/L (ref 13–60)
LYMPHOCYTES # BLD: 12 % (ref 24–44)
MAGNESIUM: 1.8 MG/DL (ref 1.6–2.6)
MCH RBC QN AUTO: 29 PG (ref 26–34)
MCHC RBC AUTO-ENTMCNC: 33.5 G/DL (ref 31–37)
MCV RBC AUTO: 86.4 FL (ref 80–100)
MDMA URINE: ABNORMAL
METHADONE SCREEN, URINE: POSITIVE
METHAMPHETAMINE, URINE: ABNORMAL
MONOCYTES # BLD: 5 % (ref 1–7)
NRBC AUTOMATED: ABNORMAL PER 100 WBC
OPIATES, URINE: NEGATIVE
OXYCODONE SCREEN URINE: NEGATIVE
PDW BLD-RTO: 12.7 % (ref 11.5–14.9)
PHENCYCLIDINE, URINE: NEGATIVE
PLATELET # BLD: 278 K/UL (ref 150–450)
PLATELET ESTIMATE: ABNORMAL
PMV BLD AUTO: 8.1 FL (ref 6–12)
POTASSIUM SERPL-SCNC: 3.8 MMOL/L (ref 3.7–5.3)
PROPOXYPHENE, URINE: ABNORMAL
RBC # BLD: 5.02 M/UL (ref 4–5.2)
RBC # BLD: ABNORMAL 10*6/UL
SEG NEUTROPHILS: 82 % (ref 36–66)
SEGMENTED NEUTROPHILS ABSOLUTE COUNT: 9 K/UL (ref 1.3–9.1)
SODIUM BLD-SCNC: 138 MMOL/L (ref 135–144)
TEST INFORMATION: ABNORMAL
TOTAL PROTEIN: 7.4 G/DL (ref 6.4–8.3)
TRICYCLIC ANTIDEPRESSANTS, UR: ABNORMAL
WBC # BLD: 10.9 K/UL (ref 3.5–11)
WBC # BLD: ABNORMAL 10*3/UL

## 2018-10-25 PROCEDURE — 6370000000 HC RX 637 (ALT 250 FOR IP): Performed by: NURSE PRACTITIONER

## 2018-10-25 PROCEDURE — 6360000002 HC RX W HCPCS: Performed by: EMERGENCY MEDICINE

## 2018-10-25 PROCEDURE — G0480 DRUG TEST DEF 1-7 CLASSES: HCPCS

## 2018-10-25 PROCEDURE — 6370000000 HC RX 637 (ALT 250 FOR IP): Performed by: PSYCHIATRY & NEUROLOGY

## 2018-10-25 PROCEDURE — 83735 ASSAY OF MAGNESIUM: CPT

## 2018-10-25 PROCEDURE — 80053 COMPREHEN METABOLIC PANEL: CPT

## 2018-10-25 PROCEDURE — 6370000000 HC RX 637 (ALT 250 FOR IP): Performed by: EMERGENCY MEDICINE

## 2018-10-25 PROCEDURE — 85025 COMPLETE CBC W/AUTO DIFF WBC: CPT

## 2018-10-25 PROCEDURE — 83690 ASSAY OF LIPASE: CPT

## 2018-10-25 PROCEDURE — 99285 EMERGENCY DEPT VISIT HI MDM: CPT

## 2018-10-25 PROCEDURE — 80307 DRUG TEST PRSMV CHEM ANLYZR: CPT

## 2018-10-25 PROCEDURE — 36415 COLL VENOUS BLD VENIPUNCTURE: CPT

## 2018-10-25 PROCEDURE — 1240000000 HC EMOTIONAL WELLNESS R&B

## 2018-10-25 PROCEDURE — 84703 CHORIONIC GONADOTROPIN ASSAY: CPT

## 2018-10-25 RX ORDER — TRAZODONE HYDROCHLORIDE 50 MG/1
50 TABLET ORAL NIGHTLY PRN
Status: DISCONTINUED | OUTPATIENT
Start: 2018-10-25 | End: 2018-11-01

## 2018-10-25 RX ORDER — METOCLOPRAMIDE HYDROCHLORIDE 5 MG/ML
10 INJECTION INTRAMUSCULAR; INTRAVENOUS EVERY 6 HOURS
Status: ON HOLD | COMMUNITY
End: 2018-11-07

## 2018-10-25 RX ORDER — CEFUROXIME AXETIL 250 MG/1
250 TABLET ORAL EVERY 12 HOURS SCHEDULED
Status: DISCONTINUED | OUTPATIENT
Start: 2018-10-25 | End: 2018-11-07 | Stop reason: HOSPADM

## 2018-10-25 RX ORDER — FAMOTIDINE 20 MG/1
20 TABLET, FILM COATED ORAL ONCE
Status: COMPLETED | OUTPATIENT
Start: 2018-10-25 | End: 2018-10-25

## 2018-10-25 RX ORDER — ACETAMINOPHEN 325 MG/1
650 TABLET ORAL EVERY 4 HOURS PRN
Status: DISCONTINUED | OUTPATIENT
Start: 2018-10-25 | End: 2018-11-07 | Stop reason: HOSPADM

## 2018-10-25 RX ORDER — DICYCLOMINE HCL 20 MG
20 TABLET ORAL
Status: DISCONTINUED | OUTPATIENT
Start: 2018-10-25 | End: 2018-11-07 | Stop reason: HOSPADM

## 2018-10-25 RX ORDER — NICOTINE 21 MG/24HR
1 PATCH, TRANSDERMAL 24 HOURS TRANSDERMAL DAILY
Status: DISCONTINUED | OUTPATIENT
Start: 2018-10-25 | End: 2018-10-26

## 2018-10-25 RX ORDER — DICYCLOMINE HYDROCHLORIDE 10 MG/1
20 CAPSULE ORAL 4 TIMES DAILY PRN
Status: CANCELLED | OUTPATIENT
Start: 2018-10-25

## 2018-10-25 RX ORDER — ONDANSETRON 4 MG/1
8 TABLET, ORALLY DISINTEGRATING ORAL ONCE
Status: COMPLETED | OUTPATIENT
Start: 2018-10-25 | End: 2018-10-25

## 2018-10-25 RX ORDER — HYDROXYZINE 50 MG/1
50 TABLET, FILM COATED ORAL 3 TIMES DAILY PRN
Status: DISCONTINUED | OUTPATIENT
Start: 2018-10-25 | End: 2018-11-02

## 2018-10-25 RX ORDER — ALPRAZOLAM 0.25 MG/1
0.25 TABLET ORAL 4 TIMES DAILY PRN
Status: CANCELLED | OUTPATIENT
Start: 2018-10-25

## 2018-10-25 RX ORDER — DICYCLOMINE HYDROCHLORIDE 10 MG/1
20 CAPSULE ORAL 4 TIMES DAILY PRN
Status: DISCONTINUED | OUTPATIENT
Start: 2018-10-25 | End: 2018-10-25

## 2018-10-25 RX ORDER — MAGNESIUM HYDROXIDE/ALUMINUM HYDROXICE/SIMETHICONE 120; 1200; 1200 MG/30ML; MG/30ML; MG/30ML
30 SUSPENSION ORAL EVERY 6 HOURS PRN
Status: DISCONTINUED | OUTPATIENT
Start: 2018-10-25 | End: 2018-11-07 | Stop reason: HOSPADM

## 2018-10-25 RX ORDER — CEFUROXIME AXETIL 250 MG/1
250 TABLET ORAL EVERY 12 HOURS SCHEDULED
Status: CANCELLED | OUTPATIENT
Start: 2018-10-25

## 2018-10-25 RX ORDER — BUSPIRONE HYDROCHLORIDE 10 MG/1
10 TABLET ORAL 3 TIMES DAILY
Status: DISCONTINUED | OUTPATIENT
Start: 2018-10-25 | End: 2018-10-27

## 2018-10-25 RX ORDER — ALPRAZOLAM 0.25 MG/1
0.25 TABLET ORAL 4 TIMES DAILY PRN
Status: DISCONTINUED | OUTPATIENT
Start: 2018-10-25 | End: 2018-10-26

## 2018-10-25 RX ORDER — DIAPER,BRIEF,INFANT-TODD,DISP
EACH MISCELLANEOUS 2 TIMES DAILY
Status: DISCONTINUED | OUTPATIENT
Start: 2018-10-25 | End: 2018-11-07 | Stop reason: HOSPADM

## 2018-10-25 RX ORDER — LOPERAMIDE HYDROCHLORIDE 2 MG/1
2 CAPSULE ORAL 4 TIMES DAILY PRN
Status: DISCONTINUED | OUTPATIENT
Start: 2018-10-25 | End: 2018-11-07 | Stop reason: HOSPADM

## 2018-10-25 RX ORDER — BENZTROPINE MESYLATE 1 MG/ML
2 INJECTION INTRAMUSCULAR; INTRAVENOUS 2 TIMES DAILY PRN
Status: DISCONTINUED | OUTPATIENT
Start: 2018-10-25 | End: 2018-11-03

## 2018-10-25 RX ORDER — ALPRAZOLAM 0.25 MG/1
0.25 TABLET ORAL 2 TIMES DAILY PRN
Status: ON HOLD | COMMUNITY
End: 2018-11-07

## 2018-10-25 RX ORDER — DIAPER,BRIEF,INFANT-TODD,DISP
EACH MISCELLANEOUS 2 TIMES DAILY
Status: CANCELLED | OUTPATIENT
Start: 2018-10-25

## 2018-10-25 RX ORDER — BUSPIRONE HYDROCHLORIDE 10 MG/1
10 TABLET ORAL 3 TIMES DAILY
Status: CANCELLED | OUTPATIENT
Start: 2018-10-25

## 2018-10-25 RX ADMIN — ALPRAZOLAM 0.25 MG: 0.25 TABLET ORAL at 15:36

## 2018-10-25 RX ADMIN — CEFUROXIME AXETIL 250 MG: 250 TABLET ORAL at 21:48

## 2018-10-25 RX ADMIN — BUSPIRONE HYDROCHLORIDE 10 MG: 10 TABLET ORAL at 21:44

## 2018-10-25 RX ADMIN — FAMOTIDINE 20 MG: 20 TABLET, FILM COATED ORAL at 11:13

## 2018-10-25 RX ADMIN — ALPRAZOLAM 0.25 MG: 0.25 TABLET ORAL at 21:54

## 2018-10-25 RX ADMIN — DICYCLOMINE HYDROCHLORIDE 20 MG: 20 TABLET ORAL at 21:44

## 2018-10-25 RX ADMIN — HYDROXYZINE HYDROCHLORIDE 50 MG: 50 TABLET, FILM COATED ORAL at 15:18

## 2018-10-25 RX ADMIN — BUSPIRONE HYDROCHLORIDE 10 MG: 10 TABLET ORAL at 15:36

## 2018-10-25 RX ADMIN — LOPERAMIDE HYDROCHLORIDE 2 MG: 2 CAPSULE ORAL at 17:51

## 2018-10-25 RX ADMIN — DICYCLOMINE HYDROCHLORIDE 20 MG: 20 TABLET ORAL at 17:51

## 2018-10-25 RX ADMIN — ONDANSETRON 8 MG: 4 TABLET, ORALLY DISINTEGRATING ORAL at 11:12

## 2018-10-25 RX ADMIN — HYDROXYZINE HYDROCHLORIDE 50 MG: 50 TABLET, FILM COATED ORAL at 21:44

## 2018-10-25 ASSESSMENT — SLEEP AND FATIGUE QUESTIONNAIRES
DO YOU HAVE DIFFICULTY SLEEPING: YES
DIFFICULTY ARISING: NO
SLEEP PATTERN: DIFFICULTY FALLING ASLEEP;DISTURBED/INTERRUPTED SLEEP;RESTLESSNESS
DO YOU USE A SLEEP AID: YES
DIFFICULTY STAYING ASLEEP: YES
DIFFICULTY FALLING ASLEEP: YES
AVERAGE NUMBER OF SLEEP HOURS: 4
RESTFUL SLEEP: NO

## 2018-10-25 ASSESSMENT — LIFESTYLE VARIABLES: HISTORY_ALCOHOL_USE: NO

## 2018-10-25 ASSESSMENT — PATIENT HEALTH QUESTIONNAIRE - PHQ9: SUM OF ALL RESPONSES TO PHQ QUESTIONS 1-9: 24

## 2018-10-25 ASSESSMENT — ENCOUNTER SYMPTOMS
VOMITING: 1
DIARRHEA: 1
ABDOMINAL PAIN: 0
NAUSEA: 1

## 2018-10-25 NOTE — ED NOTES
Patient assigned to [facility] Unit in [facility].
Pt c/o      Jeremi Tan RN  10/25/18 8128
diagnosed and treated for generalized anxiety disorder, major depression and Asperger. Patient reports feeling anxious, depressed and suicidal. Patient reports having thoughts of wanting to cut her wrists with scissors in her bathroom. Patient denies any previous attempts but states she has felt this way in the past. Patient denies H/I at this time. Patient denies auditory or visual hallucinations. Patient reports having her medications filled at a pharmacy after discharge from the [facility] and being compliant with psych medications. Patient reports experiencing nausea, vomiting and diarrhea since leaving the hospital on Tuesday. Patient denies any drug or alcohol use at this time or in the past. Patient reports that she is currently living at home with her mom and step dad. Patient reports positive support from her parents. Patient declines any type of employment at this time. Patient denies any legal issues. Patient is voluntary. Level of Care Disposition:    Writer consulted with Helen Reeves CNP, on call psychiatry. Patient to be admitted to the [facility] for safety and stabilization.

## 2018-10-25 NOTE — CARE COORDINATION
Name: Conner Novoa    : 1995    Discharge Date: 10/23/18    Primary Auth/Cert #: [id number]  Discharged to home   Discharge DX:Major depressive disorder, recurrent episode, severe with anxious distress     Discharge Medications:      Medication List      START taking these medications    busPIRone 10 MG tablet  Commonly known as:  BUSPAR  Take 1 tablet by mouth 3 times daily  Notes to patient:  anxiety     hydrOXYzine 50 MG tablet  Commonly known as:  ATARAX  Take 1 tablet by mouth 3 times daily as needed for Anxiety  Notes to patient:  anxiety     traZODone 50 MG tablet  Commonly known as:  DESYREL  Take 1 tablet by mouth nightly as needed for Sleep  Notes to patient:  Sleep aid and antidepressant     vitamin D 1000 UNIT Tabs tablet  Commonly known as:  CHOLECALCIFEROL  Take 2 tablets by mouth daily  Notes to patient:  vitamin     VORTIoxetine 5 MG tablet  Commonly known as:  TRINTELLIX  Take 3 tablets by mouth daily (with breakfast)  Notes to patient:  depression        CONTINUE taking these medications    ALPRAZolam 0.25 MG tablet  Commonly known as:  Gartheron Roblesin  Notes to patient:  anxiety     ALYACEN  PO     cefUROXime 250 MG tablet  Commonly known as:  CEFTIN  Notes to patient:  antibiotic     desonide 0.05 % cream  Commonly known as:  DESOWEN     dicyclomine 10 MG capsule  Commonly known as:  BENTYL  Notes to patient:  Stomach cramps     docusate sodium 100 MG capsule  Commonly known as:  COLACE  Notes to patient:  Stool softener        STOP taking these medications    LEXAPRO PO           Where to Get Your Medications      These medications were sent to [id number] Lori Gonzalez [address] [place] [address], [address] [place] [address]    Phone:  [phone]   · busPIRone 10 MG tablet  · hydrOXYzine 50 MG tablet  · traZODone 50 MG tablet  · vitamin D 1000 UNIT Tabs tablet  · VORTIoxetine 5 MG tablet         Follow Up Appointment: [address] [address] [facility]. Mick Winn, [address] [place] [address]  Phone: [phone]  Fax: [phone]  Please fax AVS    Go on 10/29/2018  Janey Vaughan, you have an appointment on Monday, Oct. 29th at 5:30pm with Dr. Eusebio Bronson. [facility]  Phone: [phone]  Fax: [phone]  Please fax AVS    PLEASE CALL AT LEAST 24 HOURS PRIOR TO CHANGE OR RESCHEDULE APPOINTMENT  Go on 10/30/2018  Janey Vaughan, you have a new client intake/assessment on Oct. 30th at 2:30pm.  At this appointment, they will link you with a therapist who specializes in treatment of Autism/Asperger's. Please take Insurance Cards with you.     Tiffanie Vasques  [employer] Therapist  [phone]

## 2018-10-26 PROCEDURE — 6360000002 HC RX W HCPCS: Performed by: NURSE PRACTITIONER

## 2018-10-26 PROCEDURE — 90792 PSYCH DIAG EVAL W/MED SRVCS: CPT | Performed by: NURSE PRACTITIONER

## 2018-10-26 PROCEDURE — 6370000000 HC RX 637 (ALT 250 FOR IP): Performed by: PSYCHIATRY & NEUROLOGY

## 2018-10-26 PROCEDURE — 1240000000 HC EMOTIONAL WELLNESS R&B

## 2018-10-26 PROCEDURE — 6370000000 HC RX 637 (ALT 250 FOR IP): Performed by: NURSE PRACTITIONER

## 2018-10-26 RX ORDER — ALPRAZOLAM 0.5 MG/1
0.5 TABLET ORAL 4 TIMES DAILY PRN
Status: DISCONTINUED | OUTPATIENT
Start: 2018-10-26 | End: 2018-11-07 | Stop reason: HOSPADM

## 2018-10-26 RX ORDER — ESCITALOPRAM OXALATE 20 MG/1
20 TABLET ORAL DAILY
Status: DISCONTINUED | OUTPATIENT
Start: 2018-10-26 | End: 2018-11-07 | Stop reason: HOSPADM

## 2018-10-26 RX ORDER — BUPROPION HYDROCHLORIDE 100 MG/1
100 TABLET, EXTENDED RELEASE ORAL 2 TIMES DAILY
Status: DISCONTINUED | OUTPATIENT
Start: 2018-10-26 | End: 2018-10-29

## 2018-10-26 RX ORDER — ONDANSETRON 4 MG/1
4 TABLET, FILM COATED ORAL EVERY 8 HOURS PRN
Status: DISCONTINUED | OUTPATIENT
Start: 2018-10-26 | End: 2018-11-07 | Stop reason: HOSPADM

## 2018-10-26 RX ADMIN — BUSPIRONE HYDROCHLORIDE 10 MG: 10 TABLET ORAL at 20:49

## 2018-10-26 RX ADMIN — LOPERAMIDE HYDROCHLORIDE 2 MG: 2 CAPSULE ORAL at 13:21

## 2018-10-26 RX ADMIN — ESCITALOPRAM OXALATE 20 MG: 20 TABLET ORAL at 14:59

## 2018-10-26 RX ADMIN — DICYCLOMINE HYDROCHLORIDE 20 MG: 20 TABLET ORAL at 20:49

## 2018-10-26 RX ADMIN — HYDROXYZINE HYDROCHLORIDE 50 MG: 50 TABLET, FILM COATED ORAL at 20:49

## 2018-10-26 RX ADMIN — BUPROPION HYDROCHLORIDE 100 MG: 100 TABLET, FILM COATED, EXTENDED RELEASE ORAL at 20:53

## 2018-10-26 RX ADMIN — DICYCLOMINE HYDROCHLORIDE 20 MG: 20 TABLET ORAL at 17:36

## 2018-10-26 RX ADMIN — VORTIOXETINE 15 MG: 10 TABLET, FILM COATED ORAL at 07:53

## 2018-10-26 RX ADMIN — DICYCLOMINE HYDROCHLORIDE 20 MG: 20 TABLET ORAL at 07:53

## 2018-10-26 RX ADMIN — DICYCLOMINE HYDROCHLORIDE 20 MG: 20 TABLET ORAL at 12:11

## 2018-10-26 RX ADMIN — CEFUROXIME AXETIL 250 MG: 250 TABLET ORAL at 20:53

## 2018-10-26 RX ADMIN — LOPERAMIDE HYDROCHLORIDE 2 MG: 2 CAPSULE ORAL at 20:49

## 2018-10-26 RX ADMIN — HYDROXYZINE HYDROCHLORIDE 50 MG: 50 TABLET, FILM COATED ORAL at 07:56

## 2018-10-26 RX ADMIN — CEFUROXIME AXETIL 250 MG: 250 TABLET ORAL at 07:53

## 2018-10-26 RX ADMIN — LOPERAMIDE HYDROCHLORIDE 2 MG: 2 CAPSULE ORAL at 07:53

## 2018-10-26 RX ADMIN — ALPRAZOLAM 0.5 MG: 0.5 TABLET ORAL at 15:05

## 2018-10-26 RX ADMIN — ALPRAZOLAM 0.25 MG: 0.25 TABLET ORAL at 07:53

## 2018-10-26 RX ADMIN — BUSPIRONE HYDROCHLORIDE 10 MG: 10 TABLET ORAL at 13:19

## 2018-10-26 RX ADMIN — BUSPIRONE HYDROCHLORIDE 10 MG: 10 TABLET ORAL at 07:52

## 2018-10-26 RX ADMIN — ALPRAZOLAM 0.5 MG: 0.5 TABLET ORAL at 20:49

## 2018-10-26 RX ADMIN — ONDANSETRON HYDROCHLORIDE 4 MG: 4 TABLET, FILM COATED ORAL at 18:01

## 2018-10-26 RX ADMIN — VITAMIN D, TAB 1000IU (100/BT) 2000 UNITS: 25 TAB at 07:52

## 2018-10-26 ASSESSMENT — LIFESTYLE VARIABLES: HISTORY_ALCOHOL_USE: NO

## 2018-10-26 NOTE — H&P
psychiatrist in [place] and allowed her medications to lapse. Patient has been noncompliant with her psychiatric medications over a few months now. Patient admits to feeling hopeless, helpless, worthless, with a general lack of interest in everyday activities. . No history of auditory, visual or tactile hallucinations. Patient denies any current alcohol or substance abuse. Patient lives now with her mother. Patient has some  somatic complaints of burning with urination, headaches, body aches and anal itching that has been going on for a few weeks. patient denies any fever/chills, chest pain, shortness of breath.         DIAGNOSTIC RESULTS      PAST MEDICAL HISTORY      Past Medical History        Past Medical History:   Diagnosis Date    Gastritis      PCOS (polycystic ovarian syndrome)              Pt denies any history of Diabetes mellitus type 2, hypertension, stroke, heart disease, COPD, Asthma, GERD, HLD, Cancer, Seizures,Thyroid disease, Kidney Disease, Hepatitis, TB.     SURGICAL HISTORY        Past Surgical History         Past Surgical History:   Procedure Laterality Date    OVARIAN CYST REMOVAL                FAMILY HISTORY     Family History   History reviewed.  No pertinent family history.        SOCIAL HISTORY        Social History   Social History            Social History    Marital status: Single       Spouse name: N/A    Number of children: N/A    Years of education: N/A           Social History Main Topics    Smoking status: Never Smoker    Smokeless tobacco: Never Used    Alcohol use No    Drug use: No    Sexual activity: Not Asked           Other Topics Concern    None          Social History Narrative    None            REVIEW OF SYSTEMS             Allergies   Allergen Reactions    Phenergan [Promethazine Hcl]         Muscle spasm         No current facility-administered medications on file prior to encounter.       No current outpatient prescriptions on file prior to

## 2018-10-27 PROCEDURE — 6360000002 HC RX W HCPCS: Performed by: NURSE PRACTITIONER

## 2018-10-27 PROCEDURE — 99232 SBSQ HOSP IP/OBS MODERATE 35: CPT | Performed by: NURSE PRACTITIONER

## 2018-10-27 PROCEDURE — 6370000000 HC RX 637 (ALT 250 FOR IP): Performed by: PSYCHIATRY & NEUROLOGY

## 2018-10-27 PROCEDURE — 6370000000 HC RX 637 (ALT 250 FOR IP): Performed by: NURSE PRACTITIONER

## 2018-10-27 PROCEDURE — 1240000000 HC EMOTIONAL WELLNESS R&B

## 2018-10-27 RX ORDER — BUSPIRONE HYDROCHLORIDE 15 MG/1
15 TABLET ORAL 3 TIMES DAILY
Status: DISCONTINUED | OUTPATIENT
Start: 2018-10-27 | End: 2018-11-04

## 2018-10-27 RX ADMIN — CEFUROXIME AXETIL 250 MG: 250 TABLET ORAL at 20:46

## 2018-10-27 RX ADMIN — CEFUROXIME AXETIL 250 MG: 250 TABLET ORAL at 08:40

## 2018-10-27 RX ADMIN — DICYCLOMINE HYDROCHLORIDE 20 MG: 20 TABLET ORAL at 08:41

## 2018-10-27 RX ADMIN — ESCITALOPRAM OXALATE 20 MG: 20 TABLET ORAL at 08:40

## 2018-10-27 RX ADMIN — DICYCLOMINE HYDROCHLORIDE 20 MG: 20 TABLET ORAL at 20:46

## 2018-10-27 RX ADMIN — ONDANSETRON HYDROCHLORIDE 4 MG: 4 TABLET, FILM COATED ORAL at 09:37

## 2018-10-27 RX ADMIN — VITAMIN D, TAB 1000IU (100/BT) 2000 UNITS: 25 TAB at 08:40

## 2018-10-27 RX ADMIN — BUSPIRONE HYDROCHLORIDE 15 MG: 15 TABLET ORAL at 20:46

## 2018-10-27 RX ADMIN — BUSPIRONE HYDROCHLORIDE 10 MG: 10 TABLET ORAL at 08:41

## 2018-10-27 RX ADMIN — LOPERAMIDE HYDROCHLORIDE 2 MG: 2 CAPSULE ORAL at 10:05

## 2018-10-27 RX ADMIN — HYDROXYZINE HYDROCHLORIDE 50 MG: 50 TABLET, FILM COATED ORAL at 17:43

## 2018-10-27 RX ADMIN — BUSPIRONE HYDROCHLORIDE 15 MG: 15 TABLET ORAL at 13:30

## 2018-10-27 RX ADMIN — BUPROPION HYDROCHLORIDE 100 MG: 100 TABLET, FILM COATED, EXTENDED RELEASE ORAL at 08:41

## 2018-10-27 RX ADMIN — ALPRAZOLAM 0.5 MG: 0.5 TABLET ORAL at 20:47

## 2018-10-27 RX ADMIN — BUPROPION HYDROCHLORIDE 100 MG: 100 TABLET, FILM COATED, EXTENDED RELEASE ORAL at 20:46

## 2018-10-27 NOTE — PLAN OF CARE
Problem: Altered Mood, Depressive Behavior:  Goal: Able to verbalize and/or display a decrease in depressive symptoms  Able to verbalize and/or display a decrease in depressive symptoms   Outcome: Ongoing  PSYCHOEDUCATION GROUP NOTE    Date: 10/27/18  Start Time: 1000  End Time: 1055    Number Participants in Group:  9    Goal:  Patient will demonstrate increased interpersonal interaction   Topic: Happiness: Expectations vs causality    Discipline Responsible:   OT  AT x SW  Nsg.  RT MHP Other       Participation Level:     None  Minimal   x Active Listener x Interactive    Monopolizing         Participation Quality:   Appropriate  Inappropriate          Attentive        Intrusive   x       Sharing        Resistant          Supportive        Lethargic       Affective:   x Congruent  Incongruent  Blunted  Flat    Constricted  Anxious  Elated  Angry    Labile  Depressed  Other         Cognitive:  x Alert x Oriented PPTP     Concentration x G  F  P   Attention Span x G  F  P   Short-Term Memory  G  F  P   Long-Term Memory  G  F  P   ProblemSolving/  Decision Making x G  F  P   Ability to Process  Information x G  F  P      Contributing Factors             Delusional             Hallucinating             Flight of Ideas             Other:       Modes of Intervention:  x Education x Support x Exploration    Clarifying  Problem Solving  Confrontation   x Socialization  Limit Setting  Reality Testing    Activity  Movement  Media    Other:            Response to Learning:  x Able to verbalize current knowledge/experience   x Able to verbalize/acknowledge new learning    Able to retain information   x Capable of insight    Able to change behavior   x Progressing to goal    Other:        Comments:

## 2018-10-27 NOTE — PLAN OF CARE
Problem: Altered Mood, Depressive Behavior:  Goal: Able to verbalize and/or display a decrease in depressive symptoms  Able to verbalize and/or display a decrease in depressive symptoms   Outcome: Ongoing  Pt is aloof most of the evening did come out for a short period and interact with a few peers and watch television. Pt reports generalized depression and anxiety rating both at a 8. Pt denies audio and visual hallucinations and denies wanting to cause harm to self or others. Nurse will continue to provide support as needed.

## 2018-10-27 NOTE — PLAN OF CARE
Problem: Altered Mood, Depressive Behavior:  Goal: Able to verbalize and/or display a decrease in depressive symptoms  Able to verbalize and/or display a decrease in depressive symptoms   Outcome: Ongoing  PSYCHOEDUCATION GROUP NOTE    Date: 10/27/2018  Start Time: 0900  End Time: 0920    Number Participants in Group:  10/14    Goal:  Patient will demonstrate increased interpersonal interaction   Topic: Community Meeting/Goal Setting    Discipline Responsible:   OT  AT  [facility]. x RT MHP Other       Participation Level:     None  Minimal   x Active Listener x Interactive    Monopolizing         Participation Quality:  x Appropriate  Inappropriate   x       Attentive        Intrusive   x       Sharing        Resistant          Supportive        Lethargic       Affective:    Congruent  Incongruent x Blunted  Flat    Constricted  Anxious  Elated  Angry    Labile  Depressed  Other         Cognitive:  x Alert x Oriented PPTP     Concentration x G  F  P   Attention Span x G  F  P   Short-Term Memory x G  F  P   Long-Term Memory x G  F  P   ProblemSolving/  Decision Making x G  F  P   Ability to Process  Information x G  F  P      Contributing Factors             Delusional             Hallucinating             Flight of Ideas             Other:       Modes of Intervention:  x Education x Support x Exploration   x Clarifying x Problem Solving x Confrontation   x Socialization x Limit Setting x Reality Testing    Activity  Movement  Media    Other:            Response to Learning:  x Able to verbalize current knowledge/experience   x Able to verbalize/acknowledge new learning    Able to retain information    Capable of insight    Able to change behavior   x Progressing to goal    Other:        Comments:

## 2018-10-27 NOTE — PLAN OF CARE
Problem: Altered Mood, Depressive Behavior:  Goal: Absence of self-harm  Absence of self-harm   Outcome: Ongoing  Pt remains free from self harm this shift. Pt denies wanting to cause harm to self or others at this time. Pt encouraged to seek nursing staff at anytime if she felt at danger to themselves or others. Pt states understanding. Safety checks maintained [id number]  Pt focused on generalized anxiety. Nurse discussed options on ways to cope with anxiety and the importance of taking medications as prescribed.

## 2018-10-27 NOTE — CARE COORDINATION
Discharge Plannin Fabian Dougherty    D/C location -  Live with mother in [place]    Transport -   Mother will     Pharmacy - [facility] in [address] -  Fax them    Insurance -     PRIMARY INSURANCE   Payor: [place] [employer] BENEFIT Plan:     Group Number: [id number] [facility] Type: [place] [address] Name: Colette De La Fuente : 1964   Subscriber ID: [id number]       Pat. Rel. to Subscriber: Child       SECONDARY INSURANCE   Payor: [employer] Plan:     Group Number:   Insurance Type: INDEMNITY   Subscriber Name: Porfirio Mccrackne : 1900   Subscriber ID: [id number]       Pat.  Rel. to Subscriber: CHILD
having her medications filled at a pharmacy after discharge from the [facility] and being compliant with psych medications. Patient reports experiencing nausea, vomiting and diarrhea since leaving the hospital on Tuesday. Patient denies any drug or alcohol use at this time or in the past. Patient reports that she is currently living at home with her mom and step dad. Patient reports positive support from her parents. Patient declines any type of employment at this time. Patient denies any legal issues.

## 2018-10-28 PROCEDURE — 1240000000 HC EMOTIONAL WELLNESS R&B

## 2018-10-28 PROCEDURE — 6370000000 HC RX 637 (ALT 250 FOR IP): Performed by: NURSE PRACTITIONER

## 2018-10-28 RX ADMIN — ALPRAZOLAM 0.5 MG: 0.5 TABLET ORAL at 02:50

## 2018-10-28 RX ADMIN — BUSPIRONE HYDROCHLORIDE 15 MG: 15 TABLET ORAL at 15:12

## 2018-10-28 RX ADMIN — VITAMIN D, TAB 1000IU (100/BT) 2000 UNITS: 25 TAB at 09:43

## 2018-10-28 RX ADMIN — CEFUROXIME AXETIL 250 MG: 250 TABLET ORAL at 09:43

## 2018-10-28 RX ADMIN — CEFUROXIME AXETIL 250 MG: 250 TABLET ORAL at 20:58

## 2018-10-28 RX ADMIN — BUPROPION HYDROCHLORIDE 100 MG: 100 TABLET, FILM COATED, EXTENDED RELEASE ORAL at 09:43

## 2018-10-28 RX ADMIN — HYDROXYZINE HYDROCHLORIDE 50 MG: 50 TABLET, FILM COATED ORAL at 20:59

## 2018-10-28 RX ADMIN — BUSPIRONE HYDROCHLORIDE 15 MG: 15 TABLET ORAL at 20:59

## 2018-10-28 RX ADMIN — ESCITALOPRAM OXALATE 20 MG: 20 TABLET ORAL at 09:43

## 2018-10-28 RX ADMIN — HYDROXYZINE HYDROCHLORIDE 50 MG: 50 TABLET, FILM COATED ORAL at 02:21

## 2018-10-28 RX ADMIN — HYDROXYZINE HYDROCHLORIDE 50 MG: 50 TABLET, FILM COATED ORAL at 11:02

## 2018-10-28 RX ADMIN — TRAZODONE HYDROCHLORIDE 50 MG: 50 TABLET ORAL at 20:59

## 2018-10-28 RX ADMIN — BUSPIRONE HYDROCHLORIDE 15 MG: 15 TABLET ORAL at 09:43

## 2018-10-28 ASSESSMENT — PAIN DESCRIPTION - LOCATION: LOCATION: ABDOMEN

## 2018-10-28 ASSESSMENT — PAIN SCALES - GENERAL: PAINLEVEL_OUTOF10: 5

## 2018-10-28 ASSESSMENT — PAIN DESCRIPTION - PAIN TYPE: TYPE: CHRONIC PAIN

## 2018-10-28 NOTE — PLAN OF CARE
Problem: Altered Mood, Depressive Behavior:  Goal: Able to verbalize and/or display a decrease in depressive symptoms  Able to verbalize and/or display a decrease in depressive symptoms   Outcome: Ongoing  Pt did not attend Communication/Problem Solving/Memory/Recall skills group at 1330 d/t resting in room despite staff invitation to attend.

## 2018-10-29 PROCEDURE — 6370000000 HC RX 637 (ALT 250 FOR IP): Performed by: PSYCHIATRY & NEUROLOGY

## 2018-10-29 PROCEDURE — 1240000000 HC EMOTIONAL WELLNESS R&B

## 2018-10-29 PROCEDURE — 99232 SBSQ HOSP IP/OBS MODERATE 35: CPT | Performed by: NURSE PRACTITIONER

## 2018-10-29 PROCEDURE — 6370000000 HC RX 637 (ALT 250 FOR IP): Performed by: NURSE PRACTITIONER

## 2018-10-29 RX ORDER — BUPROPION HYDROCHLORIDE 100 MG/1
200 TABLET, EXTENDED RELEASE ORAL DAILY
Status: DISCONTINUED | OUTPATIENT
Start: 2018-10-30 | End: 2018-10-31

## 2018-10-29 RX ADMIN — BUSPIRONE HYDROCHLORIDE 15 MG: 15 TABLET ORAL at 21:00

## 2018-10-29 RX ADMIN — DICYCLOMINE HYDROCHLORIDE 20 MG: 20 TABLET ORAL at 17:23

## 2018-10-29 RX ADMIN — BUSPIRONE HYDROCHLORIDE 15 MG: 15 TABLET ORAL at 08:20

## 2018-10-29 RX ADMIN — CEFUROXIME AXETIL 250 MG: 250 TABLET ORAL at 21:00

## 2018-10-29 RX ADMIN — DICYCLOMINE HYDROCHLORIDE 20 MG: 20 TABLET ORAL at 12:21

## 2018-10-29 RX ADMIN — HYDROXYZINE HYDROCHLORIDE 50 MG: 50 TABLET, FILM COATED ORAL at 08:20

## 2018-10-29 RX ADMIN — BUPROPION HYDROCHLORIDE 100 MG: 100 TABLET, FILM COATED, EXTENDED RELEASE ORAL at 08:20

## 2018-10-29 RX ADMIN — TRAZODONE HYDROCHLORIDE 50 MG: 50 TABLET ORAL at 21:00

## 2018-10-29 RX ADMIN — DICYCLOMINE HYDROCHLORIDE 20 MG: 20 TABLET ORAL at 08:22

## 2018-10-29 RX ADMIN — CEFUROXIME AXETIL 250 MG: 250 TABLET ORAL at 08:20

## 2018-10-29 RX ADMIN — DICYCLOMINE HYDROCHLORIDE 20 MG: 20 TABLET ORAL at 21:00

## 2018-10-29 RX ADMIN — VITAMIN D, TAB 1000IU (100/BT) 2000 UNITS: 25 TAB at 08:20

## 2018-10-29 RX ADMIN — BUSPIRONE HYDROCHLORIDE 15 MG: 15 TABLET ORAL at 13:04

## 2018-10-29 RX ADMIN — ESCITALOPRAM OXALATE 20 MG: 20 TABLET ORAL at 08:20

## 2018-10-29 NOTE — PLAN OF CARE
Problem: Altered Mood, Depressive Behavior:  Goal: Able to verbalize and/or display a decrease in depressive symptoms  Able to verbalize and/or display a decrease in depressive symptoms   Outcome: Ongoing  Pt admits to depression/anxiety at this time. Goal: Able to verbalize support systems  Able to verbalize support systems   Outcome: Ongoing  Pt verbalized support system. Goal: Absence of self-harm  Absence of self-harm   Outcome: Ongoing  Pt denies thoughts of self-harm at this time.

## 2018-10-29 NOTE — PLAN OF CARE
Problem: Pain:  Goal: Control of chronic pain  Control of chronic pain   Outcome: Ongoing  Janey Vaughan currently denies having any pain at this time. She's medication compliant and isn't a behavioral issues. She denies SI/HI, her depression and anxiety continues.

## 2018-10-30 PROCEDURE — 6370000000 HC RX 637 (ALT 250 FOR IP): Performed by: NURSE PRACTITIONER

## 2018-10-30 PROCEDURE — 6370000000 HC RX 637 (ALT 250 FOR IP): Performed by: PSYCHIATRY & NEUROLOGY

## 2018-10-30 PROCEDURE — 1240000000 HC EMOTIONAL WELLNESS R&B

## 2018-10-30 PROCEDURE — 99232 SBSQ HOSP IP/OBS MODERATE 35: CPT | Performed by: REGISTERED NURSE

## 2018-10-30 RX ADMIN — BUPROPION HYDROCHLORIDE 200 MG: 100 TABLET, FILM COATED, EXTENDED RELEASE ORAL at 09:07

## 2018-10-30 RX ADMIN — BUSPIRONE HYDROCHLORIDE 15 MG: 15 TABLET ORAL at 15:20

## 2018-10-30 RX ADMIN — CEFUROXIME AXETIL 250 MG: 250 TABLET ORAL at 21:11

## 2018-10-30 RX ADMIN — CEFUROXIME AXETIL 250 MG: 250 TABLET ORAL at 09:08

## 2018-10-30 RX ADMIN — ESCITALOPRAM OXALATE 20 MG: 20 TABLET ORAL at 09:06

## 2018-10-30 RX ADMIN — BUSPIRONE HYDROCHLORIDE 15 MG: 15 TABLET ORAL at 21:11

## 2018-10-30 RX ADMIN — HYDROXYZINE HYDROCHLORIDE 50 MG: 50 TABLET, FILM COATED ORAL at 21:11

## 2018-10-30 RX ADMIN — DICYCLOMINE HYDROCHLORIDE 20 MG: 20 TABLET ORAL at 21:11

## 2018-10-30 RX ADMIN — BUSPIRONE HYDROCHLORIDE 15 MG: 15 TABLET ORAL at 09:06

## 2018-10-30 RX ADMIN — ALPRAZOLAM 0.5 MG: 0.5 TABLET ORAL at 09:06

## 2018-10-30 RX ADMIN — TRAZODONE HYDROCHLORIDE 50 MG: 50 TABLET ORAL at 21:11

## 2018-10-30 RX ADMIN — HYDROXYZINE HYDROCHLORIDE 50 MG: 50 TABLET, FILM COATED ORAL at 12:29

## 2018-10-30 RX ADMIN — VITAMIN D, TAB 1000IU (100/BT) 2000 UNITS: 25 TAB at 09:07

## 2018-10-30 NOTE — PLAN OF CARE
Problem: Altered Mood, Depressive Behavior:  Goal: Able to verbalize and/or display a decrease in depressive symptoms  Able to verbalize and/or display a decrease in depressive symptoms   Psychoeducation Group Note    Date: 10/30/18  Start Time: 1430  End Time: 1500    Number Participants in Group:  6/17    Goal:  Patient will demonstrate increased interpersonal interaction   Topic: Leisure awareness, trivia skills    Discipline Responsible:   OT  AT  [facility]. x RT  Other       Participation Level:     None  Minimal   x Active Listener x Interactive    Monopolizing         Participation Quality:  x Appropriate  Inappropriate   x       Attentive        Intrusive          Sharing        Resistant          Supportive        Lethargic       Affective:   x Congruent  Incongruent  Blunted  Flat    Constricted  Anxious  Elated  Angry    Labile  Depressed  Other         Cognitive:  x Alert x Oriented PPTP     Concentration x G  F  P   Attention Span x G  F  P   Short-Term Memory  G  F  P   Long-Term Memory  G  F  P   ProblemSolving/  Decision Making x G  F  P   Ability to Process  Information x G  F  P      Contributing Factors             Delusional             Hallucinating             Flight of Ideas             Other:       Modes of Intervention:  x Education x Support x Exploration    Clarifying  Problem Solving  Confrontation   x Socialization  Limit Setting  Reality Testing   x Activity  Movement  Media    Other:            Response to Learning:  x Able to verbalize current knowledge/experience   x Able to verbalize/acknowledge new learning    Able to retain information    Capable of insight   x Able to change behavior   x Progressing to goal    Other:        Comments:

## 2018-10-30 NOTE — PLAN OF CARE
Problem: Altered Mood, Depressive Behavior:  Goal: Able to verbalize and/or display a decrease in depressive symptoms  Able to verbalize and/or display a decrease in depressive symptoms   Outcome: Ongoing  Psychoeducation Group Note    Date: 10/30/2018  Start Time: 0900  End Time: 0930    Number Participants in Group:  14    Goal:  Patient will demonstrate increased interpersonal interaction   Topic: Community meeting/ goals group    Discipline Responsible:   OT  AT  [facility]. x RT  Other       Participation Level:     None  Minimal   x Active Listener x Interactive    Monopolizing         Participation Quality:  x Appropriate  Inappropriate   x       Attentive        Intrusive   x       Sharing        Resistant          Supportive        Lethargic       Affective:    Congruent  Incongruent  Blunted  Flat   x Constricted  Anxious  Elated  Angry    Labile  Depressed  Other         Cognitive:  x Alert x Oriented PPTP     Concentration  G x F  P   Attention Span  G x F  P   Short-Term Memory  G x F  P   Long-Term Memory  G x F  P   ProblemSolving/  Decision Making  G x F  P   Ability to Process  Information  G x F  P      Contributing Factors             Delusional             Hallucinating             Flight of Ideas             Other:       Modes of Intervention:  x Education x Support x Exploration   x Clarifying x Problem Solving  Confrontation   x Socialization  Limit Setting x Reality Testing   x Activity  Movement  Media    Other:            Response to Learning:  x Able to verbalize current knowledge/experience   x Able to verbalize/acknowledge new learning   x Able to retain information    Capable of insight    Able to change behavior   x Progressing to goal    Other:        Comments:

## 2018-10-30 NOTE — PLAN OF CARE
Problem: Altered Mood, Depressive Behavior:  Goal: Able to verbalize and/or display a decrease in depressive symptoms  Able to verbalize and/or display a decrease in depressive symptoms   Outcome: Ongoing  Patient observed with sad/depressed mood and flat affect. Anxious and withdrawn. 1:1 interaction provided. Admits to fleeting suicidal thoughts with no plan, but does contract for safety. Encouraged to contact staff to process if needed. Patient agrees. ADL's encouraged. Discussed the benefits of inpatient treatment and programming expectations. Encouraged to attend groups and socialize with peers. Writer educated patient on positive coping skills and relaxation techniques. PRN Xanax given and was effective. Patient is med and meal compliant. Remains in behavior control. Remains free from harm and safety maintained. Will continue to monitor and intervene as needed. Problem: Pain:  Goal: Pain level will decrease  Pain level will decrease   Outcome: Ongoing  Denies any pain at this time. Goal: Control of acute pain  Control of acute pain   Outcome: Ongoing    Goal: Control of chronic pain  Control of chronic pain   Outcome: Ongoing      Problem: Falls - Risk of:  Goal: Will remain free from falls  Will remain free from falls   Outcome: Ongoing  Ambulation is steady. No falls or injuries noted at this time.    Goal: Absence of physical injury  Absence of physical injury   Outcome: Ongoing

## 2018-10-30 NOTE — BH NOTE
[facility] PSYCHOEDUCATION GROUP NOTE    Date:  10/30/18       Start Time: 1605     End Time:1640      Number Participants in Group: 9/17    Name of group: Smoking Cessation    Discipline Responsible:   OT  AT   xRN Nsg.  RT MHP Other       Participation Level:     None  Minimal   x Active Listener x Interactive    Monopolizing         Participation Quality:  x Appropriate  Inappropriate   x       Attentive        Intrusive   x       Sharing        Resistant   x       Supportive        Lethargic       Affective:   x Congruent  Incongruent  Blunted  Flat    Constricted  Anxious  Elated  Angry    Labile  Depressed  Other         Cognitive:  x Alert x Oriented PPTP     Concentration x G  F  P   Attention Span x G  F  P   Short-Term Memory x G  F  P   Long-Term Memory x G  F  P   ProblemSolving/  Decision Making x G  F  P   Ability to Process  Information x G  F  P      Contributing Factors             Delusional             Hallucinating             Flight of Ideas             Other: poor concentration       Modes of Intervention:  x Education x Support x Exploration    Clarifying  Problem Solving  Confrontation   x Socialization  Limit Setting  Reality Testing   x Activity  Movement  Media    Other:            Response to Learning:  x Able to verbalize current knowledge/experience   x Able to verbalize/acknowledge new learning   x Able to retain information   x Capable of insight   x Able to change behavior   x Progressing to goal    Other:        Comments: Reports being a non-smoker but offered active participation in group.

## 2018-10-30 NOTE — PLAN OF CARE
Problem: Altered Mood, Depressive Behavior:  Goal: Able to verbalize and/or display a decrease in depressive symptoms  Able to verbalize and/or display a decrease in depressive symptoms   Outcome: Ongoing  Pt displays a decrease in depressive symptoms AEB socializing with peers, interacting during groups and ability to express needs. Goal: Able to verbalize support systems  Able to verbalize support systems   Outcome: Ongoing  Pt states her family is her support system. Goal: Absence of self-harm  Absence of self-harm   Outcome: Ongoing  Pt remains free from harm self and environmental at this time.

## 2018-10-31 PROCEDURE — 6370000000 HC RX 637 (ALT 250 FOR IP): Performed by: PSYCHIATRY & NEUROLOGY

## 2018-10-31 PROCEDURE — 6370000000 HC RX 637 (ALT 250 FOR IP): Performed by: NURSE PRACTITIONER

## 2018-10-31 PROCEDURE — 99232 SBSQ HOSP IP/OBS MODERATE 35: CPT | Performed by: NURSE PRACTITIONER

## 2018-10-31 PROCEDURE — 1240000000 HC EMOTIONAL WELLNESS R&B

## 2018-10-31 RX ORDER — ARIPIPRAZOLE 5 MG/1
5 TABLET ORAL DAILY
Status: DISCONTINUED | OUTPATIENT
Start: 2018-10-31 | End: 2018-11-07 | Stop reason: HOSPADM

## 2018-10-31 RX ADMIN — ARIPIPRAZOLE 5 MG: 5 TABLET ORAL at 15:20

## 2018-10-31 RX ADMIN — BUPROPION HYDROCHLORIDE 200 MG: 100 TABLET, FILM COATED, EXTENDED RELEASE ORAL at 08:20

## 2018-10-31 RX ADMIN — TRAZODONE HYDROCHLORIDE 50 MG: 50 TABLET ORAL at 20:58

## 2018-10-31 RX ADMIN — VITAMIN D, TAB 1000IU (100/BT) 2000 UNITS: 25 TAB at 08:21

## 2018-10-31 RX ADMIN — DICYCLOMINE HYDROCHLORIDE 20 MG: 20 TABLET ORAL at 12:17

## 2018-10-31 RX ADMIN — BUSPIRONE HYDROCHLORIDE 15 MG: 15 TABLET ORAL at 20:58

## 2018-10-31 RX ADMIN — ESCITALOPRAM OXALATE 20 MG: 20 TABLET ORAL at 08:20

## 2018-10-31 RX ADMIN — DICYCLOMINE HYDROCHLORIDE 20 MG: 20 TABLET ORAL at 08:20

## 2018-10-31 RX ADMIN — BUSPIRONE HYDROCHLORIDE 15 MG: 15 TABLET ORAL at 08:20

## 2018-10-31 RX ADMIN — DICYCLOMINE HYDROCHLORIDE 20 MG: 20 TABLET ORAL at 20:58

## 2018-10-31 RX ADMIN — CEFUROXIME AXETIL 250 MG: 250 TABLET ORAL at 20:57

## 2018-10-31 RX ADMIN — CEFUROXIME AXETIL 250 MG: 250 TABLET ORAL at 08:20

## 2018-10-31 RX ADMIN — DICYCLOMINE HYDROCHLORIDE 20 MG: 20 TABLET ORAL at 17:22

## 2018-10-31 RX ADMIN — BUSPIRONE HYDROCHLORIDE 15 MG: 15 TABLET ORAL at 14:12

## 2018-10-31 RX ADMIN — HYDROXYZINE HYDROCHLORIDE 50 MG: 50 TABLET, FILM COATED ORAL at 14:12

## 2018-10-31 RX ADMIN — ALPRAZOLAM 0.5 MG: 0.5 TABLET ORAL at 20:58

## 2018-10-31 NOTE — PLAN OF CARE
Problem: Altered Mood, Depressive Behavior:  Goal: Able to verbalize and/or display a decrease in depressive symptoms  Able to verbalize and/or display a decrease in depressive symptoms    Outcome: Ongoing  PSYCHOEDUCATION GROUP NOTE    Date: 10/31/18  Start Time: 1330  End Time: 1415    Number Participants in Group:  11    Goal:  Patient will demonstrate increased interpersonal interaction   Topic: Music and leisure    Discipline Responsible:   OT  AT  [facility]. x RT MHP Other       Participation Level:     None  Minimal    Active Listener x Interactive    Monopolizing         Participation Quality:  x Appropriate  Inappropriate   x       Attentive        Intrusive   x       Sharing        Resistant          Supportive        Lethargic       Affective:   x Congruent  Incongruent  Blunted  Flat    Constricted  Anxious  Elated  Angry    Labile  Depressed  Other         Cognitive:  x Alert x Oriented PPTP     Concentration  G x F  P   Attention Span x G  F  P   Short-Term Memory  G x F  P   Long-Term Memory x G  F  P   ProblemSolving/  Decision Making  G x F  P   Ability to Process  Information x G  F  P      Contributing Factors             Delusional             Hallucinating             Flight of Ideas             Other:       Modes of Intervention:  x Education x Support x Exploration    Clarifying x Problem Solving  Confrontation   x Socialization  Limit Setting  Reality Testing   x Activity  Movement x Media    Other:            Response to Learning:  x Able to verbalize current knowledge/experience   x Able to verbalize/acknowledge new learning   x Able to retain information   x Capable of insight   x Able to change behavior   x Progressing to goal    Other:        Comments: Pt attended group and participated.

## 2018-10-31 NOTE — PLAN OF CARE
Problem: Altered Mood, Depressive Behavior:  Goal: Able to verbalize and/or display a decrease in depressive symptoms  Able to verbalize and/or display a decrease in depressive symptoms    Psychoeducation Group Note    Date: 10/31/18  Start Time: 1100  End Time: 1145    Number Participants in Group:  5/11    Goal:  Patient will demonstrate increased interpersonal interaction   Topic: Learning how to keep a budget    Discipline Responsible:   OT  AT  [facility]. x RT  Other       Participation Level:     None  Minimal   x Active Listener x Interactive    Monopolizing         Participation Quality:  x Appropriate  Inappropriate   x       Attentive        Intrusive          Sharing        Resistant          Supportive        Lethargic       Affective:   x Congruent  Incongruent  Blunted  Flat    Constricted  Anxious  Elated  Angry    Labile  Depressed  Other         Cognitive:  x Alert x Oriented PPTP     Concentration x G  F  P   Attention Span x G  F  P   Short-Term Memory  G  F  P   Long-Term Memory  G  F  P   ProblemSolving/  Decision Making x G  F  P   Ability to Process  Information x G  F  P      Contributing Factors             Delusional             Hallucinating             Flight of Ideas             Other:       Modes of Intervention:  x Education x Support x Exploration    Clarifying  Problem Solving  Confrontation   x Socialization  Limit Setting  Reality Testing   x Activity  Movement  Media    Other:            Response to Learning:  x Able to verbalize current knowledge/experience   x Able to verbalize/acknowledge new learning    Able to retain information   x Capable of insight   x Able to change behavior    Progressing to goal    Other:        Comments:

## 2018-11-01 PROCEDURE — 99232 SBSQ HOSP IP/OBS MODERATE 35: CPT | Performed by: NURSE PRACTITIONER

## 2018-11-01 PROCEDURE — 1240000000 HC EMOTIONAL WELLNESS R&B

## 2018-11-01 PROCEDURE — 6370000000 HC RX 637 (ALT 250 FOR IP): Performed by: NURSE PRACTITIONER

## 2018-11-01 PROCEDURE — 6370000000 HC RX 637 (ALT 250 FOR IP): Performed by: PSYCHIATRY & NEUROLOGY

## 2018-11-01 RX ORDER — TRAZODONE HYDROCHLORIDE 50 MG/1
25 TABLET ORAL NIGHTLY PRN
Status: DISCONTINUED | OUTPATIENT
Start: 2018-11-01 | End: 2018-11-02

## 2018-11-01 RX ADMIN — DICYCLOMINE HYDROCHLORIDE 20 MG: 20 TABLET ORAL at 17:50

## 2018-11-01 RX ADMIN — CEFUROXIME AXETIL 250 MG: 250 TABLET ORAL at 20:32

## 2018-11-01 RX ADMIN — TRAZODONE HYDROCHLORIDE 25 MG: 50 TABLET ORAL at 20:33

## 2018-11-01 RX ADMIN — BUSPIRONE HYDROCHLORIDE 15 MG: 15 TABLET ORAL at 15:18

## 2018-11-01 RX ADMIN — CEFUROXIME AXETIL 250 MG: 250 TABLET ORAL at 08:38

## 2018-11-01 RX ADMIN — DICYCLOMINE HYDROCHLORIDE 20 MG: 20 TABLET ORAL at 08:42

## 2018-11-01 RX ADMIN — DICYCLOMINE HYDROCHLORIDE 20 MG: 20 TABLET ORAL at 12:21

## 2018-11-01 RX ADMIN — BUSPIRONE HYDROCHLORIDE 15 MG: 15 TABLET ORAL at 08:38

## 2018-11-01 RX ADMIN — BUSPIRONE HYDROCHLORIDE 15 MG: 15 TABLET ORAL at 20:32

## 2018-11-01 RX ADMIN — VITAMIN D, TAB 1000IU (100/BT) 2000 UNITS: 25 TAB at 08:38

## 2018-11-01 RX ADMIN — HYDROXYZINE HYDROCHLORIDE 50 MG: 50 TABLET, FILM COATED ORAL at 08:42

## 2018-11-01 RX ADMIN — ESCITALOPRAM OXALATE 20 MG: 20 TABLET ORAL at 08:37

## 2018-11-01 RX ADMIN — DICYCLOMINE HYDROCHLORIDE 20 MG: 20 TABLET ORAL at 20:32

## 2018-11-01 RX ADMIN — HYDROXYZINE HYDROCHLORIDE 50 MG: 50 TABLET, FILM COATED ORAL at 20:33

## 2018-11-01 RX ADMIN — ARIPIPRAZOLE 5 MG: 5 TABLET ORAL at 08:37

## 2018-11-01 RX ADMIN — ALPRAZOLAM 0.5 MG: 0.5 TABLET ORAL at 12:21

## 2018-11-01 NOTE — PLAN OF CARE
Problem: Altered Mood, Depressive Behavior:  Goal: Able to verbalize and/or display a decrease in depressive symptoms  Able to verbalize and/or display a decrease in depressive symptoms    Outcome: Ongoing  Pt did not attend Community Meeting at 0900 d/t resting in room despite staff invitation to attend.

## 2018-11-01 NOTE — BH NOTE
Date:11/1/18   Start Time: 1600                    End Time: 1642     Number Participants in Group:  8     Goal:  Patient will demonstrate increased interpersonal interaction   Topic: socialization proper interactions with others     Discipline Responsible:    OT   AT      x Nsg.   RT MHP Other         Participation Level:                   None   Minimal     x Active Listener   Interactive     Monopolizing             Participation Quality:    x Appropriate   Inappropriate     x       Attentive         Intrusive     x       Sharing         Resistant           Supportive         Lethargic         Affective:            Congruent   Incongruent   Blunted   x Flat     Constricted   Anxious   Elated   Angry     Labile   Depressed   Other             Cognitive:    x Alert   Oriented PPTP      Concentration   G   x F   P   Attention Span   G   x F   P   Short-Term Memory   G   x F   P   Long-Term Memory   G   F   P   ProblemSolving/  Decision Making   G   x F   P   Ability to Process  Information   G   x F   P        Contributing Factors              Delusional              Hallucinating              Flight of Ideas              Other:         Modes of Intervention:    x Education   Support   Exploration     X  Clarifying   x Problem Solving   Confrontation     x Socialization   x Limit Setting   Reality Testing     Activity   Movement   Media     Other:                  Response to Learning:    x Able to verbalize current knowledge/experience     x Able to verbalize/acknowledge new learning     Able to retain information     Capable of insight     Able to change behavior     Progressing to goal     Other:          Comments:

## 2018-11-02 PROBLEM — F33.9 MAJOR DEPRESSIVE DISORDER, RECURRENT (HCC): Status: RESOLVED | Noted: 2018-10-25 | Resolved: 2018-11-02

## 2018-11-02 PROCEDURE — 6370000000 HC RX 637 (ALT 250 FOR IP): Performed by: NURSE PRACTITIONER

## 2018-11-02 PROCEDURE — 6360000002 HC RX W HCPCS: Performed by: NURSE PRACTITIONER

## 2018-11-02 PROCEDURE — 99232 SBSQ HOSP IP/OBS MODERATE 35: CPT | Performed by: PSYCHIATRY & NEUROLOGY

## 2018-11-02 PROCEDURE — 6370000000 HC RX 637 (ALT 250 FOR IP): Performed by: PSYCHIATRY & NEUROLOGY

## 2018-11-02 PROCEDURE — 1240000000 HC EMOTIONAL WELLNESS R&B

## 2018-11-02 RX ORDER — HYDROXYZINE 50 MG/1
50 TABLET, FILM COATED ORAL 3 TIMES DAILY PRN
Status: DISCONTINUED | OUTPATIENT
Start: 2018-11-02 | End: 2018-11-03

## 2018-11-02 RX ADMIN — DICYCLOMINE HYDROCHLORIDE 20 MG: 20 TABLET ORAL at 08:38

## 2018-11-02 RX ADMIN — BUSPIRONE HYDROCHLORIDE 15 MG: 15 TABLET ORAL at 08:38

## 2018-11-02 RX ADMIN — BUSPIRONE HYDROCHLORIDE 15 MG: 15 TABLET ORAL at 14:42

## 2018-11-02 RX ADMIN — ALPRAZOLAM 0.5 MG: 0.5 TABLET ORAL at 17:49

## 2018-11-02 RX ADMIN — HYDROXYZINE HYDROCHLORIDE 50 MG: 50 TABLET, FILM COATED ORAL at 20:59

## 2018-11-02 RX ADMIN — ARIPIPRAZOLE 5 MG: 5 TABLET ORAL at 08:37

## 2018-11-02 RX ADMIN — VITAMIN D, TAB 1000IU (100/BT) 2000 UNITS: 25 TAB at 08:38

## 2018-11-02 RX ADMIN — ESCITALOPRAM OXALATE 20 MG: 20 TABLET ORAL at 08:37

## 2018-11-02 RX ADMIN — DICYCLOMINE HYDROCHLORIDE 20 MG: 20 TABLET ORAL at 20:59

## 2018-11-02 RX ADMIN — ONDANSETRON HYDROCHLORIDE 4 MG: 4 TABLET, FILM COATED ORAL at 14:42

## 2018-11-02 RX ADMIN — CEFUROXIME AXETIL 250 MG: 250 TABLET ORAL at 10:48

## 2018-11-02 RX ADMIN — BUSPIRONE HYDROCHLORIDE 15 MG: 15 TABLET ORAL at 20:59

## 2018-11-02 NOTE — PLAN OF CARE
Problem: Altered Mood, Depressive Behavior:  Goal: Able to verbalize and/or display a decrease in depressive symptoms  Able to verbalize and/or display a decrease in depressive symptoms    Outcome: Ongoing  Patient admits to depression and anxiety at this time. Patient is medication compliant and has been out in dayroom and attending groups. Goal: Able to verbalize support systems  Able to verbalize support systems    Outcome: Ongoing  Patient admits to a positive support system. Goal: Absence of self-harm  Absence of self-harm    Outcome: Ongoing  Patient has been free from self harm at this time and agrees to approach staff if having any thoughts to harm self or others. Q15 min safety checks continue at this time.

## 2018-11-03 PROCEDURE — 1240000000 HC EMOTIONAL WELLNESS R&B

## 2018-11-03 PROCEDURE — 6370000000 HC RX 637 (ALT 250 FOR IP): Performed by: PSYCHIATRY & NEUROLOGY

## 2018-11-03 PROCEDURE — 6370000000 HC RX 637 (ALT 250 FOR IP): Performed by: NURSE PRACTITIONER

## 2018-11-03 PROCEDURE — 6360000002 HC RX W HCPCS: Performed by: NURSE PRACTITIONER

## 2018-11-03 RX ORDER — HYDROXYZINE 50 MG/1
100 TABLET, FILM COATED ORAL NIGHTLY PRN
Status: DISCONTINUED | OUTPATIENT
Start: 2018-11-03 | End: 2018-11-07 | Stop reason: HOSPADM

## 2018-11-03 RX ADMIN — BUSPIRONE HYDROCHLORIDE 15 MG: 15 TABLET ORAL at 21:20

## 2018-11-03 RX ADMIN — ALPRAZOLAM 0.5 MG: 0.5 TABLET ORAL at 21:20

## 2018-11-03 RX ADMIN — DICYCLOMINE HYDROCHLORIDE 20 MG: 20 TABLET ORAL at 11:30

## 2018-11-03 RX ADMIN — CEFUROXIME AXETIL 250 MG: 250 TABLET ORAL at 09:30

## 2018-11-03 RX ADMIN — BUSPIRONE HYDROCHLORIDE 15 MG: 15 TABLET ORAL at 08:19

## 2018-11-03 RX ADMIN — ESCITALOPRAM OXALATE 20 MG: 20 TABLET ORAL at 08:19

## 2018-11-03 RX ADMIN — DICYCLOMINE HYDROCHLORIDE 20 MG: 20 TABLET ORAL at 21:20

## 2018-11-03 RX ADMIN — VITAMIN D, TAB 1000IU (100/BT) 2000 UNITS: 25 TAB at 08:19

## 2018-11-03 RX ADMIN — DICYCLOMINE HYDROCHLORIDE 20 MG: 20 TABLET ORAL at 08:19

## 2018-11-03 RX ADMIN — HYDROXYZINE HYDROCHLORIDE 50 MG: 50 TABLET, FILM COATED ORAL at 08:23

## 2018-11-03 RX ADMIN — HYDROXYZINE HYDROCHLORIDE 100 MG: 50 TABLET, FILM COATED ORAL at 21:20

## 2018-11-03 RX ADMIN — BUSPIRONE HYDROCHLORIDE 15 MG: 15 TABLET ORAL at 13:27

## 2018-11-03 RX ADMIN — ARIPIPRAZOLE 5 MG: 5 TABLET ORAL at 08:19

## 2018-11-03 RX ADMIN — ONDANSETRON HYDROCHLORIDE 4 MG: 4 TABLET, FILM COATED ORAL at 11:31

## 2018-11-03 RX ADMIN — HYDROCORTISONE: 1 CREAM TOPICAL at 08:19

## 2018-11-03 RX ADMIN — DICYCLOMINE HYDROCHLORIDE 20 MG: 20 TABLET ORAL at 18:00

## 2018-11-03 RX ADMIN — CEFUROXIME AXETIL 250 MG: 250 TABLET ORAL at 21:22

## 2018-11-03 NOTE — PLAN OF CARE
Problem: Altered Mood, Depressive Behavior:  Intervention: Group therapy to identify positive coping skills  PSYCHOEDUCATION GROUP NOTE    11/03//18              Start Time:     1000                   End Time: 2591    Number Participants in Group: 12/22    Topic/Discussion: discharge planning / after care follow ups      Goals: developing good habits, appointment planning and follow up       Provider: [place]     Participation Level:     None  Minimal    Active Listener x Interactive    Monopolizing         Participation Quality:  x Appropriate  Inappropriate     Attentive   Intrusive     Sharing   Resistant     Supportive    Lethargic       Affective:    Congruent  Incongruent  Blunted  Flat   X Constricted  Anxious  Elated  Angry    Labile  Depressed  Irritable  Wtdrwn       Cognitive:  x Alert x Oriented PPTS     Concentration G  F x P    Attention Span G  F x P    Short-Term Memory G  F x P    Long-Term Memory G  F x P    ProblemSolving/  Decision Making G  F x P    Ability to Process  Information G  F x P       Contributing Factors             Delusional             Hallucinating             Flight of Ideas             Other: poor concentration       Modes of Intervention:  x Education x Support  Exploration   x Clarifying x Problem Solving  Confrontation   x Socialization  Limit Setting  Reality Testing   x Activity  Movement  Media    Other:          Response to Learning:  x Able to verbalize current knowledge/experience   x Able to verbalize/acknowledge new learning   x Able to retain information   x Capable of insight   x Able to change behavior   x Progressing to goal    Other: not making progress       Comments:  Participated in group

## 2018-11-04 PROCEDURE — 6370000000 HC RX 637 (ALT 250 FOR IP): Performed by: NURSE PRACTITIONER

## 2018-11-04 PROCEDURE — 6370000000 HC RX 637 (ALT 250 FOR IP): Performed by: PSYCHIATRY & NEUROLOGY

## 2018-11-04 PROCEDURE — 1240000000 HC EMOTIONAL WELLNESS R&B

## 2018-11-04 PROCEDURE — 99232 SBSQ HOSP IP/OBS MODERATE 35: CPT | Performed by: PSYCHIATRY & NEUROLOGY

## 2018-11-04 RX ORDER — BUSPIRONE HYDROCHLORIDE 10 MG/1
20 TABLET ORAL 3 TIMES DAILY
Status: DISCONTINUED | OUTPATIENT
Start: 2018-11-04 | End: 2018-11-07 | Stop reason: HOSPADM

## 2018-11-04 RX ADMIN — DICYCLOMINE HYDROCHLORIDE 20 MG: 20 TABLET ORAL at 17:21

## 2018-11-04 RX ADMIN — CEFUROXIME AXETIL 250 MG: 250 TABLET ORAL at 08:33

## 2018-11-04 RX ADMIN — BUSPIRONE HYDROCHLORIDE 20 MG: 10 TABLET ORAL at 15:18

## 2018-11-04 RX ADMIN — ALPRAZOLAM 0.5 MG: 0.5 TABLET ORAL at 08:40

## 2018-11-04 RX ADMIN — BUSPIRONE HYDROCHLORIDE 20 MG: 10 TABLET ORAL at 20:49

## 2018-11-04 RX ADMIN — ARIPIPRAZOLE 5 MG: 5 TABLET ORAL at 08:33

## 2018-11-04 RX ADMIN — VITAMIN D, TAB 1000IU (100/BT) 2000 UNITS: 25 TAB at 08:33

## 2018-11-04 RX ADMIN — CEFUROXIME AXETIL 250 MG: 250 TABLET ORAL at 20:49

## 2018-11-04 RX ADMIN — ESCITALOPRAM OXALATE 20 MG: 20 TABLET ORAL at 08:33

## 2018-11-04 RX ADMIN — DICYCLOMINE HYDROCHLORIDE 20 MG: 20 TABLET ORAL at 20:49

## 2018-11-04 RX ADMIN — HYDROXYZINE HYDROCHLORIDE 100 MG: 50 TABLET, FILM COATED ORAL at 20:49

## 2018-11-04 RX ADMIN — Medication 2 MG: at 18:24

## 2018-11-04 RX ADMIN — BUSPIRONE HYDROCHLORIDE 15 MG: 15 TABLET ORAL at 08:34

## 2018-11-04 NOTE — PLAN OF CARE
Problem: Altered Mood, Depressive Behavior:  Goal: Able to verbalize and/or display a decrease in depressive symptoms  Able to verbalize and/or display a decrease in depressive symptoms    Outcome: Ongoing  Pt did not participate in Therapeutic Leisure Skills Group at 1330 despite staff encouragement.

## 2018-11-05 PROCEDURE — 6370000000 HC RX 637 (ALT 250 FOR IP): Performed by: NURSE PRACTITIONER

## 2018-11-05 PROCEDURE — 1240000000 HC EMOTIONAL WELLNESS R&B

## 2018-11-05 PROCEDURE — 6370000000 HC RX 637 (ALT 250 FOR IP): Performed by: PSYCHIATRY & NEUROLOGY

## 2018-11-05 PROCEDURE — 99232 SBSQ HOSP IP/OBS MODERATE 35: CPT | Performed by: NURSE PRACTITIONER

## 2018-11-05 RX ADMIN — BUSPIRONE HYDROCHLORIDE 20 MG: 10 TABLET ORAL at 08:29

## 2018-11-05 RX ADMIN — BUSPIRONE HYDROCHLORIDE 20 MG: 10 TABLET ORAL at 15:06

## 2018-11-05 RX ADMIN — ARIPIPRAZOLE 5 MG: 5 TABLET ORAL at 08:30

## 2018-11-05 RX ADMIN — DICYCLOMINE HYDROCHLORIDE 20 MG: 20 TABLET ORAL at 18:09

## 2018-11-05 RX ADMIN — BUSPIRONE HYDROCHLORIDE 20 MG: 10 TABLET ORAL at 21:13

## 2018-11-05 RX ADMIN — Medication 2 MG: at 18:09

## 2018-11-05 RX ADMIN — CEFUROXIME AXETIL 250 MG: 250 TABLET ORAL at 08:30

## 2018-11-05 RX ADMIN — ESCITALOPRAM OXALATE 20 MG: 20 TABLET ORAL at 08:30

## 2018-11-05 RX ADMIN — VITAMIN D, TAB 1000IU (100/BT) 2000 UNITS: 25 TAB at 08:30

## 2018-11-05 RX ADMIN — HYDROXYZINE HYDROCHLORIDE 100 MG: 50 TABLET, FILM COATED ORAL at 21:13

## 2018-11-05 RX ADMIN — CEFUROXIME AXETIL 250 MG: 250 TABLET ORAL at 21:13

## 2018-11-05 RX ADMIN — DICYCLOMINE HYDROCHLORIDE 20 MG: 20 TABLET ORAL at 08:30

## 2018-11-05 NOTE — PLAN OF CARE
Problem: Altered Mood, Depressive Behavior:  Intervention: Group therapy to identify positive coping skills  PSYCHOTHERAPY GROUP NOTE    11/5/2018               Start Time:    1000                 End Time: 4077      Number Participants in Group: 10/12      Goals: relationships and recovery         RT x SW  Nsg  LPN   BHTII   LPC       Participation Level:     None  Minimal   x Active Listener x Interactive    Monopolizing         Participation Quality:  x Appropriate  Inappropriate   x  Attentive   Intrusive   x  Sharing   Resistant   x  Supportive    Lethargic       Affective:   x Congruent  Incongruent  Blunted  Flat    Constricted  Anxious  Elated  Angry    Labile  Depressed  Other         Cognitive:  x Alert x Oriented PPTS     Concentration G x F  P    Attention Span G x F  P    Short-Term Memory G x F  P    Long-Term Memory G x F  P    Problem Solving/  Decision Making G x F  P    Ability to Process  Information G x F  P       Contributing Factors             Delusional             Hallucinating             Flight of Ideas             Other: poor concentration       Modes of Intervention:   Education x Support x Exploration   x Clarifying x Problem Solving  Confrontation    Socialization  Limit Setting  Reality Testing    Activity  Movement  Media    Other:          Response to Learning:  x Able to verbalize current knowledge/experience   x Able to verbalize/acknowledge new learning   x Able to retain information   x Capable of insight   x Able to change behavior   x Progressing to goal    Other: intrusive and monopolizing       Comments:  participated in group was active

## 2018-11-06 PROCEDURE — 6370000000 HC RX 637 (ALT 250 FOR IP): Performed by: PSYCHIATRY & NEUROLOGY

## 2018-11-06 PROCEDURE — 6370000000 HC RX 637 (ALT 250 FOR IP): Performed by: NURSE PRACTITIONER

## 2018-11-06 PROCEDURE — 99232 SBSQ HOSP IP/OBS MODERATE 35: CPT | Performed by: NURSE PRACTITIONER

## 2018-11-06 PROCEDURE — 1240000000 HC EMOTIONAL WELLNESS R&B

## 2018-11-06 RX ORDER — HYDROXYZINE HYDROCHLORIDE 10 MG/1
10 TABLET, FILM COATED ORAL 3 TIMES DAILY PRN
Status: DISCONTINUED | OUTPATIENT
Start: 2018-11-06 | End: 2018-11-07 | Stop reason: HOSPADM

## 2018-11-06 RX ADMIN — BUSPIRONE HYDROCHLORIDE 20 MG: 10 TABLET ORAL at 15:51

## 2018-11-06 RX ADMIN — CEFUROXIME AXETIL 250 MG: 250 TABLET ORAL at 08:44

## 2018-11-06 RX ADMIN — DICYCLOMINE HYDROCHLORIDE 20 MG: 20 TABLET ORAL at 06:38

## 2018-11-06 RX ADMIN — HYDROXYZINE HYDROCHLORIDE 100 MG: 50 TABLET, FILM COATED ORAL at 20:40

## 2018-11-06 RX ADMIN — ALPRAZOLAM 0.5 MG: 0.5 TABLET ORAL at 08:45

## 2018-11-06 RX ADMIN — ARIPIPRAZOLE 5 MG: 5 TABLET ORAL at 08:44

## 2018-11-06 RX ADMIN — VITAMIN D, TAB 1000IU (100/BT) 2000 UNITS: 25 TAB at 08:44

## 2018-11-06 RX ADMIN — ALPRAZOLAM 0.5 MG: 0.5 TABLET ORAL at 20:40

## 2018-11-06 RX ADMIN — ALPRAZOLAM 0.5 MG: 0.5 TABLET ORAL at 15:51

## 2018-11-06 RX ADMIN — CEFUROXIME AXETIL 250 MG: 250 TABLET ORAL at 20:41

## 2018-11-06 RX ADMIN — Medication 2 MG: at 18:25

## 2018-11-06 RX ADMIN — ESCITALOPRAM OXALATE 20 MG: 20 TABLET ORAL at 08:46

## 2018-11-06 RX ADMIN — DICYCLOMINE HYDROCHLORIDE 20 MG: 20 TABLET ORAL at 20:40

## 2018-11-06 RX ADMIN — BUSPIRONE HYDROCHLORIDE 20 MG: 10 TABLET ORAL at 20:40

## 2018-11-06 RX ADMIN — DICYCLOMINE HYDROCHLORIDE 20 MG: 20 TABLET ORAL at 12:41

## 2018-11-06 RX ADMIN — HYDROXYZINE HYDROCHLORIDE 10 MG: 10 TABLET, FILM COATED ORAL at 16:51

## 2018-11-06 RX ADMIN — BUSPIRONE HYDROCHLORIDE 20 MG: 10 TABLET ORAL at 08:45

## 2018-11-06 NOTE — PROGRESS NOTES
Pharmacy Med Education Group Note    Date: 10/31  Start Time: 2259  End Time: 6403    Number Participants in Group:  14    Goal:  Patient will demonstrate an understanding of the medications intended purpose and possible adverse effects  Topic: [place] for Pharmacy Med Ed Group    Discipline Responsible:     OT  AT  [facility].  RT     X Other       Participation Level:     None  Minimal      X Active Listener    X Interactive    Monopolizing         Participation Quality:    X Appropriate  Inappropriate     X       Attentive        Intrusive          Sharing        Resistant          Supportive        Lethargic       Affective:     X Congruent  Incongruent  Blunted  Flat    Constricted  Anxious  Elated  Angry    Labile  Depressed  Other         Cognitive:    X Alert  Oriented PPTP     Concentration   X G  F  P   Attention Span   X G  F  P   Short-Term Memory   X G  F  P   Long-Term Memory  G  F  P   ProblemSolving/  Decision Making  G  F  P   Ability to Process  Information   X G  F  P      Contributing Factors             Delusional             Hallucinating             Flight of Ideas             Other:       Modes of Intervention:    X Education   X Support  Exploration    Clarifying  Problem Solving  Confrontation    Socialization  Limit Setting  Reality Testing    Activity  Movement  Media    Other:            Response to Learning:    X Able to verbalize current knowledge/experience    Able to verbalize/acknowledge new learning    Able to retain information    Capable of insight    Able to change behavior    Progressing to goal    Other:        Comments:
Psychiatric Progress Note Nurse Practitioner  Pertinent History & Psychiatric Examination    HPI: Carrie Reardon is seen today in the dayroom. She endorses feeling worse today. She states the anxiety is increasing today. Depression, hopelessness, and anxiety are worsening, denies SI/HI/AVH. She reports she is anxious about discharge and is fearful about going home and being alone. Working with Social Work on care after discharge. Some trouble falling asleep. Appetite is good. Med compliant. Attending group. Chart and medications reviewed. Therapeutic support provided. Complaints of Pain: none  Functioning Relationships: good support system      Mental Status Evaluation:  Orientation: alertness: alert   Mood:. anxious      Affect:  normal and blunted      Appearance:  younger than stated age   Activity:  Restless & fidgety   Gait/Posture: Normal   Speech:  delayed   Thought Process:  circumstantial   Thought Content:  Fleeting Suicidal, no plan   Sensorium:  Person, place, time and situation   Cognition:  grossly intact   Memory: intact   Insight:  fair   Judgment: fair   Suicidal Ideations: without plan   Homicidal Ideations: Negative for homicidal ideation      Medication Side Effects: absent       Attention Span attention span appeared shorter than expected for age     Clinical Assessment Medical Decision    Axis I: Generalized Anxiety Disorder  Major Depressive Disorder, Severe, Recurrent    Precautions with Justification:   Suicide    PLAN:  Buspar increased to 15mg 1 tab PO TID . Continue Wellbutrin to QAM. Changed 10/29/18.     Anticipated Discharge Date: TBD    Patient's Response to Treatment: positive    MARGARET Dyson  10/30/2018  11:13 AM
Psychiatric Progress Note Nurse Practitioner  Pertinent History & Psychiatric Examination    HPI: Jordin Nelson is seen today in the dayroom. She states that her mood is improved and anxiety is a little better. Denies SI/HI/AVH. She reports she is anxious about discharge and is fearful about going home and being alone. Working with Social Work on care after discharge. Appetite is good. Med compliant. Encouraged use of PRN medication for insomnia and anxiety. Attending group. Chart and medications reviewed. Therapeutic support provided. Complaints of Pain: none  Functioning Relationships: good support system      Mental Status Evaluation:  Orientation: alertness: alert   Mood:. anxious      Affect:  normal and blunted      Appearance:  younger than stated age   Activity:  Restless & fidgety   Gait/Posture: Normal   Speech:  delayed   Thought Process:  circumstantial   Thought Content:  Fleeting Suicidal, no plan   Sensorium:  Person, place, time and situation   Cognition:  grossly intact   Memory: intact   Insight:  fair   Judgment: fair   Suicidal Ideations: without plan   Homicidal Ideations: Negative for homicidal ideation      Medication Side Effects: absent       Attention Span attention span appeared shorter than expected for age     Clinical Assessment Medical Decision    Axis I: Generalized Anxiety Disorder  Major Depressive Disorder, Severe, Recurrent    Precautions with Justification:   Suicide    PLAN:  Buspar increased to 15mg 1 tab PO TID, Abilify 5 mg PO daily. Encouraged use of PRN's for anxiety and sleep.     Anticipated Discharge Date: TBD    Patient's Response to Treatment: positive    Garrison Handley CNP  11/1/2018  2:43 PM
RT ASSESSMENT TREATMENT GOALS    [x]Pt Goal:  Pt will identify 1-2 positive coping skills by time of discharge. []Pt Goal:  Pt will identify 1-2 positive aspects of self by time of discharge. [x]Pt Goal:  Pt will remain on task/topic for 15-30 minutes during group by time of discharge. []Pt Goal:  Pt will identify 1-2 aspects of relapse prevention plan by time of discharge. []Pt Goal:  Pt will join in conversation with peers 1-2 times per group by time of discharge. []Pt Goal:  Pt will identify 1-2 new leisure interests by time of discharge. []Pt Goal:  Pt will not voice any delusional content by time of discharge.
support  · Compliance with follow-up and prescribed medications    Anticipated Discharge Date: TBD    Patient's Response to Treatment: positive    Michelle Lynn CNP  11/5/2018  8:06 PM

## 2018-11-06 NOTE — PLAN OF CARE
Problem: Altered Mood, Depressive Behavior:  Goal: Able to verbalize and/or display a decrease in depressive symptoms  Able to verbalize and/or display a decrease in depressive symptoms    Outcome: Ongoing  PSYCHOEDUCATION GROUP NOTE    Date: 11/6/18  Start Time: 1100  End Time: 1150    Number Participants in Group:  10    Goal:  Patient will demonstrate increased interpersonal interaction   Topic: Gratitude and framing    Discipline Responsible:   OT  AT  [facility]. x RT MHP Other       Participation Level:     None  Minimal    Active Listener x Interactive    Monopolizing         Participation Quality:  x Appropriate  Inappropriate   x       Attentive        Intrusive   x       Sharing        Resistant          Supportive        Lethargic       Affective:   x Congruent  Incongruent  Blunted  Flat    Constricted  Anxious  Elated  Angry    Labile  Depressed  Other         Cognitive:  x Alert x Oriented PPTP     Concentration  G x F  P   Attention Span  G x F  P   Short-Term Memory  G x F  P   Long-Term Memory  G x F  P   ProblemSolving/  Decision Making  G x F  P   Ability to Process  Information  G x F  P      Contributing Factors             Delusional             Hallucinating             Flight of Ideas             Other:       Modes of Intervention:  x Education x Support x Exploration    Clarifying  Problem Solving  Confrontation   x Socialization  Limit Setting  Reality Testing   x Activity  Movement  Media    Other:            Response to Learning:  x Able to verbalize current knowledge/experience   x Able to verbalize/acknowledge new learning   x Able to retain information   x Capable of insight   x Able to change behavior   x Progressing to goal    Other:        Comments: Pt attended group and participated.

## 2018-11-06 NOTE — PLAN OF CARE
Problem: Altered Mood, Depressive Behavior:  Goal: Able to verbalize and/or display a decrease in depressive symptoms  Able to verbalize and/or display a decrease in depressive symptoms    Outcome: Ongoing  Pt did not attend communication and social skills group at 1440 despite staff invitation to attend.

## 2018-11-06 NOTE — PLAN OF CARE
Problem: Altered Mood, Depressive Behavior:  Goal: Able to verbalize and/or display a decrease in depressive symptoms  Able to verbalize and/or display a decrease in depressive symptoms    Psychoeducation Group Note    Date: 11/6/18  Start Time: 0900  End Time: 0930    Number Participants in Group:  11/22    Goal:  Patient will demonstrate increased interpersonal interaction   Topic: goal setting and community meeting     Discipline Responsible:   OT  AT  [facility]. x RT  Other       Participation Level:     None  Minimal   x Active Listener x Interactive    Monopolizing         Participation Quality:  x Appropriate  Inappropriate          Attentive        Intrusive          Sharing        Resistant          Supportive        Lethargic       Affective:   x Congruent  Incongruent  Blunted  Flat    Constricted  Anxious  Elated  Angry    Labile  Depressed  Other         Cognitive:  x Alert x Oriented PPTP     Concentration x G  F  P   Attention Span x G  F  P   Short-Term Memory  G  F  P   Long-Term Memory  G  F  P   ProblemSolving/  Decision Making x G  F  P   Ability to Process  Information x G  F  P      Contributing Factors             Delusional             Hallucinating             Flight of Ideas             Other:       Modes of Intervention:  x Education x Support  Exploration    Clarifying x Problem Solving  Confrontation   x Socialization  Limit Setting  Reality Testing   x Activity  Movement  Media    Other:            Response to Learning:  x Able to verbalize current knowledge/experience   x Able to verbalize/acknowledge new learning   x Able to retain information   x Capable of insight    Able to change behavior    Progressing to goal    Other:        Comments:

## 2018-11-07 VITALS
DIASTOLIC BLOOD PRESSURE: 77 MMHG | TEMPERATURE: 97.7 F | SYSTOLIC BLOOD PRESSURE: 108 MMHG | OXYGEN SATURATION: 96 % | HEIGHT: 67 IN | WEIGHT: 205 LBS | BODY MASS INDEX: 32.18 KG/M2 | RESPIRATION RATE: 12 BRPM | HEART RATE: 72 BPM

## 2018-11-07 PROBLEM — R45.851 SUICIDAL IDEATIONS: Status: RESOLVED | Noted: 2018-10-11 | Resolved: 2018-11-07

## 2018-11-07 PROCEDURE — 6370000000 HC RX 637 (ALT 250 FOR IP): Performed by: PSYCHIATRY & NEUROLOGY

## 2018-11-07 PROCEDURE — 6370000000 HC RX 637 (ALT 250 FOR IP): Performed by: NURSE PRACTITIONER

## 2018-11-07 PROCEDURE — 5130000000 HC BRIDGE APPOINTMENT

## 2018-11-07 PROCEDURE — 99238 HOSP IP/OBS DSCHRG MGMT 30/<: CPT | Performed by: NURSE PRACTITIONER

## 2018-11-07 RX ORDER — ARIPIPRAZOLE 5 MG/1
5 TABLET ORAL DAILY
Qty: 14 TABLET | Refills: 0 | Status: SHIPPED | OUTPATIENT
Start: 2018-11-08 | End: 2019-02-08 | Stop reason: ALTCHOICE

## 2018-11-07 RX ORDER — ESCITALOPRAM OXALATE 20 MG/1
20 TABLET ORAL DAILY
Qty: 14 TABLET | Refills: 0 | Status: SHIPPED | OUTPATIENT
Start: 2018-11-08 | End: 2019-02-08 | Stop reason: ALTCHOICE

## 2018-11-07 RX ORDER — ALPRAZOLAM 0.5 MG/1
0.5 TABLET ORAL 4 TIMES DAILY PRN
Qty: 48 TABLET | Refills: 0 | Status: SHIPPED | OUTPATIENT
Start: 2018-11-07 | End: 2018-12-07

## 2018-11-07 RX ORDER — HYDROXYZINE HYDROCHLORIDE 10 MG/1
10 TABLET, FILM COATED ORAL 3 TIMES DAILY PRN
Qty: 42 TABLET | Refills: 0 | Status: SHIPPED | OUTPATIENT
Start: 2018-11-07 | End: 2018-11-17

## 2018-11-07 RX ORDER — BUSPIRONE HYDROCHLORIDE 10 MG/1
20 TABLET ORAL 3 TIMES DAILY
Qty: 42 TABLET | Refills: 0 | Status: SHIPPED | OUTPATIENT
Start: 2018-11-07 | End: 2019-02-08 | Stop reason: ALTCHOICE

## 2018-11-07 RX ORDER — DICYCLOMINE HCL 20 MG
20 TABLET ORAL
Qty: 56 TABLET | Refills: 0 | Status: ON HOLD | OUTPATIENT
Start: 2018-11-07 | End: 2019-04-15 | Stop reason: DRUGHIGH

## 2018-11-07 RX ADMIN — BUSPIRONE HYDROCHLORIDE 20 MG: 10 TABLET ORAL at 13:42

## 2018-11-07 RX ADMIN — VITAMIN D, TAB 1000IU (100/BT) 2000 UNITS: 25 TAB at 08:15

## 2018-11-07 RX ADMIN — ALPRAZOLAM 0.5 MG: 0.5 TABLET ORAL at 08:08

## 2018-11-07 RX ADMIN — CEFUROXIME AXETIL 250 MG: 250 TABLET ORAL at 11:52

## 2018-11-07 RX ADMIN — ESCITALOPRAM OXALATE 20 MG: 20 TABLET ORAL at 08:08

## 2018-11-07 RX ADMIN — ARIPIPRAZOLE 5 MG: 5 TABLET ORAL at 08:08

## 2018-11-07 RX ADMIN — BUSPIRONE HYDROCHLORIDE 20 MG: 10 TABLET ORAL at 08:08

## 2018-11-07 NOTE — BH NOTE
[facility]  Discharge Note    Pt discharged with followings belongings:   Dentures: None  Vision - Corrective Lenses: Glasses  Hearing Aid: None  Jewelry: None  Body Piercings Removed: N/A  Clothing: Footwear, Pants, Shirt, Sweater  Were All Patient Medications Collected?: Not Applicable  Other Valuables: None   Valuables sent home with patient. Valuables retrieved from safe, Security envelope number:(  N/A ) and returned to patient. Patient left department with Departure Mode: With parents via Mobility at Departure: Ambulatory, discharged to Discharged to: Private Residence. Patient education on aftercare instructions: given. Information faxed to [facility] by staff,  Patient verbalize understanding of AVS:  Yes.     Status EXAM upon discharge:  Status and Exam  Normal: No  Facial Expression: Worried, Flat  Affect: Blunt  Level of Consciousness: Alert  Mood:Normal: No  Mood: Depressed, Anxious  Motor Activity:Normal: Yes  Motor Activity:  (within normal limits)  Interview Behavior: Cooperative  Preception: [place] to Person, Сергей Sell to Time, [place] to Place, [place] to Situation  Attention:Normal: No  Attention: Distractible  Thought Processes: Blocking  Thought Content:Normal: No  Thought Content: Preoccupations  Hallucinations: None  Delusions: No  Memory:Normal: Yes  Memory:  (intact)  Insight and Judgment: No  Insight and Judgment: Poor Judgment, Poor Insight  Present Suicidal Ideation: No  Present Homicidal Ideation: No    Cecil Alvares RN

## 2019-02-08 ENCOUNTER — HOSPITAL ENCOUNTER (EMERGENCY)
Age: 24
Discharge: OTHER INSTITUTION | End: 2019-02-08
Payer: COMMERCIAL

## 2019-02-08 VITALS
SYSTOLIC BLOOD PRESSURE: 109 MMHG | RESPIRATION RATE: 16 BRPM | DIASTOLIC BLOOD PRESSURE: 68 MMHG | TEMPERATURE: 97.8 F | HEART RATE: 77 BPM | OXYGEN SATURATION: 97 %

## 2019-02-08 DIAGNOSIS — F32.A DEPRESSION, UNSPECIFIED DEPRESSION TYPE: ICD-10-CM

## 2019-02-08 DIAGNOSIS — R45.851 SUICIDAL IDEATIONS: Primary | ICD-10-CM

## 2019-02-08 DIAGNOSIS — Z00.8 MEDICAL CLEARANCE FOR PSYCHIATRIC ADMISSION: ICD-10-CM

## 2019-02-08 PROBLEM — F33.9 MAJOR DEPRESSION, RECURRENT (HCC): Status: ACTIVE | Noted: 2019-02-08

## 2019-02-08 LAB
-: ABNORMAL
ABSOLUTE EOS #: 0.12 K/UL (ref 0–0.44)
ABSOLUTE IMMATURE GRANULOCYTE: 0.03 K/UL (ref 0–0.3)
ABSOLUTE LYMPH #: 1.99 K/UL (ref 1.1–3.7)
ABSOLUTE MONO #: 0.62 K/UL (ref 0.1–1.2)
ACETAMINOPHEN LEVEL: <5 UG/ML (ref 10–30)
ALBUMIN SERPL-MCNC: 4.1 G/DL (ref 3.5–5.2)
ALBUMIN/GLOBULIN RATIO: 1.5 (ref 1–2.5)
ALP BLD-CCNC: 70 U/L (ref 35–104)
ALT SERPL-CCNC: 25 U/L (ref 5–33)
AMORPHOUS: ABNORMAL
AMPHETAMINE SCREEN URINE: NEGATIVE
ANION GAP SERPL CALCULATED.3IONS-SCNC: 9 MMOL/L (ref 9–17)
AST SERPL-CCNC: 15 U/L
BACTERIA: ABNORMAL
BARBITURATE SCREEN URINE: NEGATIVE
BASOPHILS # BLD: 0 % (ref 0–2)
BASOPHILS ABSOLUTE: 0.03 K/UL (ref 0–0.2)
BENZODIAZEPINE SCREEN, URINE: POSITIVE
BILIRUB SERPL-MCNC: 0.27 MG/DL (ref 0.3–1.2)
BILIRUBIN URINE: NEGATIVE
BUN BLDV-MCNC: 8 MG/DL (ref 6–20)
BUN/CREAT BLD: 12 (ref 9–20)
BUPRENORPHINE URINE: NEGATIVE
CALCIUM SERPL-MCNC: 9.1 MG/DL (ref 8.6–10.4)
CANNABINOID SCREEN URINE: NEGATIVE
CASTS UA: ABNORMAL /LPF
CHLORIDE BLD-SCNC: 103 MMOL/L (ref 98–107)
CO2: 26 MMOL/L (ref 20–31)
COCAINE METABOLITE, URINE: NEGATIVE
COLOR: YELLOW
COMMENT UA: ABNORMAL
CREAT SERPL-MCNC: 0.68 MG/DL (ref 0.5–0.9)
CRYSTALS, UA: ABNORMAL /HPF
DIFFERENTIAL TYPE: ABNORMAL
EOSINOPHILS RELATIVE PERCENT: 1 % (ref 1–4)
EPITHELIAL CELLS UA: ABNORMAL /HPF (ref 0–25)
ETHANOL PERCENT: <0.01 %
ETHANOL: <10 MG/DL
GFR AFRICAN AMERICAN: >60 ML/MIN
GFR NON-AFRICAN AMERICAN: >60 ML/MIN
GFR SERPL CREATININE-BSD FRML MDRD: ABNORMAL ML/MIN/{1.73_M2}
GFR SERPL CREATININE-BSD FRML MDRD: ABNORMAL ML/MIN/{1.73_M2}
GLUCOSE BLD-MCNC: 83 MG/DL (ref 70–99)
GLUCOSE URINE: NEGATIVE
HCG QUALITATIVE: NEGATIVE
HCT VFR BLD CALC: 40.9 % (ref 36.3–47.1)
HEMOGLOBIN: 13.9 G/DL (ref 11.9–15.1)
IMMATURE GRANULOCYTES: 0 %
KETONES, URINE: NEGATIVE
LEUKOCYTE ESTERASE, URINE: NEGATIVE
LYMPHOCYTES # BLD: 24 % (ref 24–43)
MCH RBC QN AUTO: 29.1 PG (ref 25.2–33.5)
MCHC RBC AUTO-ENTMCNC: 34 G/DL (ref 28.4–34.8)
MCV RBC AUTO: 85.7 FL (ref 82.6–102.9)
MDMA URINE: ABNORMAL
METHADONE SCREEN, URINE: NEGATIVE
METHAMPHETAMINE, URINE: NEGATIVE
MONOCYTES # BLD: 7 % (ref 3–12)
MUCUS: ABNORMAL
NITRITE, URINE: NEGATIVE
NRBC AUTOMATED: 0 PER 100 WBC
OPIATES, URINE: NEGATIVE
OTHER OBSERVATIONS UA: ABNORMAL
OXYCODONE SCREEN URINE: NEGATIVE
PDW BLD-RTO: 12.6 % (ref 11.8–14.4)
PH UA: 7 (ref 5–9)
PHENCYCLIDINE, URINE: NEGATIVE
PLATELET # BLD: 254 K/UL (ref 138–453)
PLATELET ESTIMATE: ABNORMAL
PMV BLD AUTO: 9.6 FL (ref 8.1–13.5)
POTASSIUM SERPL-SCNC: 3.9 MMOL/L (ref 3.7–5.3)
PROPOXYPHENE, URINE: NEGATIVE
PROTEIN UA: NEGATIVE
RBC # BLD: 4.77 M/UL (ref 3.95–5.11)
RBC # BLD: ABNORMAL 10*6/UL
RBC UA: ABNORMAL /HPF (ref 0–2)
RENAL EPITHELIAL, UA: ABNORMAL /HPF
SALICYLATE LEVEL: <1 MG/DL (ref 3–10)
SEG NEUTROPHILS: 68 % (ref 36–65)
SEGMENTED NEUTROPHILS ABSOLUTE COUNT: 5.54 K/UL (ref 1.5–8.1)
SODIUM BLD-SCNC: 138 MMOL/L (ref 135–144)
SPECIFIC GRAVITY UA: <1.005 (ref 1.01–1.02)
TEST INFORMATION: ABNORMAL
TOTAL PROTEIN: 6.8 G/DL (ref 6.4–8.3)
TRICHOMONAS: ABNORMAL
TRICYCLIC ANTIDEPRESSANTS, UR: NEGATIVE
TURBIDITY: CLEAR
URINE HGB: NEGATIVE
UROBILINOGEN, URINE: NORMAL
WBC # BLD: 8.3 K/UL (ref 3.5–11.3)
WBC # BLD: ABNORMAL 10*3/UL
WBC UA: ABNORMAL /HPF (ref 0–5)
YEAST: ABNORMAL

## 2019-02-08 PROCEDURE — 80307 DRUG TEST PRSMV CHEM ANLYZR: CPT

## 2019-02-08 PROCEDURE — 85025 COMPLETE CBC W/AUTO DIFF WBC: CPT

## 2019-02-08 PROCEDURE — G0480 DRUG TEST DEF 1-7 CLASSES: HCPCS

## 2019-02-08 PROCEDURE — 81001 URINALYSIS AUTO W/SCOPE: CPT

## 2019-02-08 PROCEDURE — 99285 EMERGENCY DEPT VISIT HI MDM: CPT

## 2019-02-08 PROCEDURE — 80306 DRUG TEST PRSMV INSTRMNT: CPT

## 2019-02-08 PROCEDURE — 36415 COLL VENOUS BLD VENIPUNCTURE: CPT

## 2019-02-08 PROCEDURE — 84703 CHORIONIC GONADOTROPIN ASSAY: CPT

## 2019-02-08 PROCEDURE — 80053 COMPREHEN METABOLIC PANEL: CPT

## 2019-02-08 RX ORDER — FLUOXETINE HYDROCHLORIDE 20 MG/1
20 CAPSULE ORAL DAILY
Status: ON HOLD | COMMUNITY
End: 2019-02-13 | Stop reason: HOSPADM

## 2019-02-08 RX ORDER — LITHIUM CARBONATE 300 MG
300 TABLET ORAL 2 TIMES DAILY
Status: ON HOLD | COMMUNITY
End: 2019-04-15

## 2019-02-08 ASSESSMENT — PATIENT HEALTH QUESTIONNAIRE - PHQ9: SUM OF ALL RESPONSES TO PHQ QUESTIONS 1-9: 21

## 2019-02-09 ENCOUNTER — HOSPITAL ENCOUNTER (INPATIENT)
Age: 24
LOS: 4 days | Discharge: HOME | DRG: 885 | End: 2019-02-13
Attending: PSYCHIATRY & NEUROLOGY | Admitting: PSYCHIATRY & NEUROLOGY
Payer: COMMERCIAL

## 2019-02-09 PROCEDURE — 1240000000 HC EMOTIONAL WELLNESS R&B

## 2019-02-09 PROCEDURE — 99222 1ST HOSP IP/OBS MODERATE 55: CPT | Performed by: PSYCHIATRY & NEUROLOGY

## 2019-02-09 PROCEDURE — 6370000000 HC RX 637 (ALT 250 FOR IP): Performed by: PSYCHIATRY & NEUROLOGY

## 2019-02-09 RX ORDER — BENZTROPINE MESYLATE 1 MG/ML
2 INJECTION INTRAMUSCULAR; INTRAVENOUS 2 TIMES DAILY PRN
Status: DISCONTINUED | OUTPATIENT
Start: 2019-02-09 | End: 2019-02-13 | Stop reason: HOSPADM

## 2019-02-09 RX ORDER — PANTOPRAZOLE SODIUM 20 MG/1
40 TABLET, DELAYED RELEASE ORAL DAILY
Status: ON HOLD | COMMUNITY
End: 2019-04-15 | Stop reason: DRUGHIGH

## 2019-02-09 RX ORDER — CEFUROXIME AXETIL 250 MG/1
250 TABLET ORAL DAILY
Status: ON HOLD | COMMUNITY
Start: 2019-01-26 | End: 2019-02-13 | Stop reason: HOSPADM

## 2019-02-09 RX ORDER — LITHIUM CARBONATE 300 MG/1
300 CAPSULE ORAL 2 TIMES DAILY
Status: DISCONTINUED | OUTPATIENT
Start: 2019-02-09 | End: 2019-02-13 | Stop reason: HOSPADM

## 2019-02-09 RX ORDER — PANTOPRAZOLE SODIUM 40 MG/1
40 TABLET, DELAYED RELEASE ORAL DAILY
Status: DISCONTINUED | OUTPATIENT
Start: 2019-02-09 | End: 2019-02-13 | Stop reason: HOSPADM

## 2019-02-09 RX ORDER — FLUOXETINE HYDROCHLORIDE 20 MG/1
20 CAPSULE ORAL DAILY
Status: DISCONTINUED | OUTPATIENT
Start: 2019-02-09 | End: 2019-02-09

## 2019-02-09 RX ORDER — MAGNESIUM HYDROXIDE/ALUMINUM HYDROXICE/SIMETHICONE 120; 1200; 1200 MG/30ML; MG/30ML; MG/30ML
30 SUSPENSION ORAL EVERY 6 HOURS PRN
Status: DISCONTINUED | OUTPATIENT
Start: 2019-02-09 | End: 2019-02-13 | Stop reason: HOSPADM

## 2019-02-09 RX ORDER — HYDROXYZINE 50 MG/1
50 TABLET, FILM COATED ORAL 3 TIMES DAILY PRN
Status: DISCONTINUED | OUTPATIENT
Start: 2019-02-09 | End: 2019-02-13 | Stop reason: HOSPADM

## 2019-02-09 RX ORDER — FLUOXETINE HYDROCHLORIDE 20 MG/1
40 CAPSULE ORAL DAILY
Status: DISCONTINUED | OUTPATIENT
Start: 2019-02-10 | End: 2019-02-11

## 2019-02-09 RX ORDER — ALPRAZOLAM 0.5 MG/1
0.5 TABLET ORAL 3 TIMES DAILY
Status: DISCONTINUED | OUTPATIENT
Start: 2019-02-09 | End: 2019-02-13 | Stop reason: HOSPADM

## 2019-02-09 RX ORDER — TRAZODONE HYDROCHLORIDE 50 MG/1
50 TABLET ORAL NIGHTLY PRN
Status: DISCONTINUED | OUTPATIENT
Start: 2019-02-09 | End: 2019-02-13 | Stop reason: HOSPADM

## 2019-02-09 RX ORDER — ALPRAZOLAM 0.5 MG/1
0.5 TABLET ORAL 3 TIMES DAILY
Status: ON HOLD | COMMUNITY
End: 2019-04-15 | Stop reason: DRUGHIGH

## 2019-02-09 RX ORDER — ACETAMINOPHEN 325 MG/1
650 TABLET ORAL EVERY 4 HOURS PRN
Status: DISCONTINUED | OUTPATIENT
Start: 2019-02-09 | End: 2019-02-13 | Stop reason: HOSPADM

## 2019-02-09 RX ADMIN — LURASIDONE HYDROCHLORIDE 40 MG: 40 TABLET, FILM COATED ORAL at 14:40

## 2019-02-09 RX ADMIN — LITHIUM CARBONATE 300 MG: 300 CAPSULE, GELATIN COATED ORAL at 22:33

## 2019-02-09 RX ADMIN — LITHIUM CARBONATE 300 MG: 300 CAPSULE, GELATIN COATED ORAL at 14:40

## 2019-02-09 RX ADMIN — PANTOPRAZOLE SODIUM 40 MG: 40 TABLET, DELAYED RELEASE ORAL at 14:40

## 2019-02-09 RX ADMIN — ALPRAZOLAM 0.5 MG: 0.5 TABLET ORAL at 22:33

## 2019-02-09 RX ADMIN — ALPRAZOLAM 0.5 MG: 0.5 TABLET ORAL at 14:40

## 2019-02-09 ASSESSMENT — LIFESTYLE VARIABLES
HISTORY_ALCOHOL_USE: NO
HISTORY_ALCOHOL_USE: NO

## 2019-02-09 ASSESSMENT — SLEEP AND FATIGUE QUESTIONNAIRES
DO YOU HAVE DIFFICULTY SLEEPING: NO
DO YOU USE A SLEEP AID: NO
AVERAGE NUMBER OF SLEEP HOURS: 6

## 2019-02-09 ASSESSMENT — PAIN SCALES - GENERAL: PAINLEVEL_OUTOF10: 0

## 2019-02-09 ASSESSMENT — PATIENT HEALTH QUESTIONNAIRE - PHQ9: SUM OF ALL RESPONSES TO PHQ QUESTIONS 1-9: 20

## 2019-02-10 LAB
ABSOLUTE EOS #: 0.1 K/UL (ref 0–0.4)
ABSOLUTE IMMATURE GRANULOCYTE: ABNORMAL K/UL (ref 0–0.3)
ABSOLUTE LYMPH #: 1.5 K/UL (ref 1–4.8)
ABSOLUTE MONO #: 0.5 K/UL (ref 0.1–1.3)
ALBUMIN SERPL-MCNC: 3.8 G/DL (ref 3.5–5.2)
ALBUMIN/GLOBULIN RATIO: ABNORMAL (ref 1–2.5)
ALP BLD-CCNC: 65 U/L (ref 35–104)
ALT SERPL-CCNC: 23 U/L (ref 5–33)
ANION GAP SERPL CALCULATED.3IONS-SCNC: 10 MMOL/L (ref 9–17)
AST SERPL-CCNC: 14 U/L
BASOPHILS # BLD: 0 % (ref 0–2)
BASOPHILS ABSOLUTE: 0 K/UL (ref 0–0.2)
BILIRUB SERPL-MCNC: 0.25 MG/DL (ref 0.3–1.2)
BUN BLDV-MCNC: 7 MG/DL (ref 6–20)
BUN/CREAT BLD: ABNORMAL (ref 9–20)
CALCIUM SERPL-MCNC: 8.9 MG/DL (ref 8.6–10.4)
CHLORIDE BLD-SCNC: 106 MMOL/L (ref 98–107)
CHOLESTEROL/HDL RATIO: 4.8
CHOLESTEROL: 195 MG/DL
CO2: 26 MMOL/L (ref 20–31)
CREAT SERPL-MCNC: 0.64 MG/DL (ref 0.5–0.9)
DIFFERENTIAL TYPE: ABNORMAL
EOSINOPHILS RELATIVE PERCENT: 2 % (ref 0–4)
ESTIMATED AVERAGE GLUCOSE: 85 MG/DL
GFR AFRICAN AMERICAN: >60 ML/MIN
GFR NON-AFRICAN AMERICAN: >60 ML/MIN
GFR SERPL CREATININE-BSD FRML MDRD: ABNORMAL ML/MIN/{1.73_M2}
GFR SERPL CREATININE-BSD FRML MDRD: ABNORMAL ML/MIN/{1.73_M2}
GLUCOSE BLD-MCNC: 88 MG/DL (ref 70–99)
HBA1C MFR BLD: 4.6 % (ref 4–6)
HCT VFR BLD CALC: 39.6 % (ref 36–46)
HDLC SERPL-MCNC: 41 MG/DL
HEMOGLOBIN: 13.6 G/DL (ref 12–16)
IMMATURE GRANULOCYTES: ABNORMAL %
LDL CHOLESTEROL: 119 MG/DL (ref 0–130)
LYMPHOCYTES # BLD: 20 % (ref 24–44)
MCH RBC QN AUTO: 29.7 PG (ref 26–34)
MCHC RBC AUTO-ENTMCNC: 34.4 G/DL (ref 31–37)
MCV RBC AUTO: 86.2 FL (ref 80–100)
MONOCYTES # BLD: 7 % (ref 1–7)
NRBC AUTOMATED: ABNORMAL PER 100 WBC
PDW BLD-RTO: 13.4 % (ref 11.5–14.9)
PLATELET # BLD: 226 K/UL (ref 150–450)
PLATELET ESTIMATE: ABNORMAL
PMV BLD AUTO: 8.6 FL (ref 6–12)
POTASSIUM SERPL-SCNC: 4.3 MMOL/L (ref 3.7–5.3)
RBC # BLD: 4.6 M/UL (ref 4–5.2)
RBC # BLD: ABNORMAL 10*6/UL
SEG NEUTROPHILS: 71 % (ref 36–66)
SEGMENTED NEUTROPHILS ABSOLUTE COUNT: 5.6 K/UL (ref 1.3–9.1)
SODIUM BLD-SCNC: 142 MMOL/L (ref 135–144)
THYROXINE, FREE: 0.94 NG/DL (ref 0.93–1.7)
TOTAL PROTEIN: 6.4 G/DL (ref 6.4–8.3)
TRIGL SERPL-MCNC: 173 MG/DL
TSH SERPL DL<=0.05 MIU/L-ACNC: 1.6 MIU/L (ref 0.3–5)
VLDLC SERPL CALC-MCNC: ABNORMAL MG/DL (ref 1–30)
WBC # BLD: 7.7 K/UL (ref 3.5–11)
WBC # BLD: ABNORMAL 10*3/UL

## 2019-02-10 PROCEDURE — 85025 COMPLETE CBC W/AUTO DIFF WBC: CPT

## 2019-02-10 PROCEDURE — 80061 LIPID PANEL: CPT

## 2019-02-10 PROCEDURE — 84443 ASSAY THYROID STIM HORMONE: CPT

## 2019-02-10 PROCEDURE — 80053 COMPREHEN METABOLIC PANEL: CPT

## 2019-02-10 PROCEDURE — 6370000000 HC RX 637 (ALT 250 FOR IP): Performed by: PSYCHIATRY & NEUROLOGY

## 2019-02-10 PROCEDURE — 1240000000 HC EMOTIONAL WELLNESS R&B

## 2019-02-10 PROCEDURE — 83036 HEMOGLOBIN GLYCOSYLATED A1C: CPT

## 2019-02-10 PROCEDURE — 84439 ASSAY OF FREE THYROXINE: CPT

## 2019-02-10 PROCEDURE — 36415 COLL VENOUS BLD VENIPUNCTURE: CPT

## 2019-02-10 PROCEDURE — 99232 SBSQ HOSP IP/OBS MODERATE 35: CPT | Performed by: PSYCHIATRY & NEUROLOGY

## 2019-02-10 RX ADMIN — VITAMIN D, TAB 1000IU (100/BT) 2000 UNITS: 25 TAB at 08:36

## 2019-02-10 RX ADMIN — PANTOPRAZOLE SODIUM 40 MG: 40 TABLET, DELAYED RELEASE ORAL at 08:36

## 2019-02-10 RX ADMIN — HYDROXYZINE HYDROCHLORIDE 50 MG: 50 TABLET, FILM COATED ORAL at 20:58

## 2019-02-10 RX ADMIN — LITHIUM CARBONATE 300 MG: 300 CAPSULE, GELATIN COATED ORAL at 20:58

## 2019-02-10 RX ADMIN — FLUOXETINE HYDROCHLORIDE 40 MG: 20 CAPSULE ORAL at 08:36

## 2019-02-10 RX ADMIN — ALPRAZOLAM 0.5 MG: 0.5 TABLET ORAL at 20:57

## 2019-02-10 RX ADMIN — LITHIUM CARBONATE 300 MG: 300 CAPSULE, GELATIN COATED ORAL at 08:35

## 2019-02-10 RX ADMIN — ALPRAZOLAM 0.5 MG: 0.5 TABLET ORAL at 08:36

## 2019-02-10 RX ADMIN — LURASIDONE HYDROCHLORIDE 40 MG: 40 TABLET, FILM COATED ORAL at 08:36

## 2019-02-11 PROCEDURE — 6370000000 HC RX 637 (ALT 250 FOR IP): Performed by: PSYCHIATRY & NEUROLOGY

## 2019-02-11 PROCEDURE — 1240000000 HC EMOTIONAL WELLNESS R&B

## 2019-02-11 PROCEDURE — 99232 SBSQ HOSP IP/OBS MODERATE 35: CPT | Performed by: NURSE PRACTITIONER

## 2019-02-11 RX ORDER — FLUOXETINE HYDROCHLORIDE 20 MG/1
60 CAPSULE ORAL DAILY
Status: DISCONTINUED | OUTPATIENT
Start: 2019-02-12 | End: 2019-02-13 | Stop reason: HOSPADM

## 2019-02-11 RX ADMIN — LURASIDONE HYDROCHLORIDE 40 MG: 40 TABLET, FILM COATED ORAL at 19:33

## 2019-02-11 RX ADMIN — FLUOXETINE HYDROCHLORIDE 40 MG: 20 CAPSULE ORAL at 08:49

## 2019-02-11 RX ADMIN — PANTOPRAZOLE SODIUM 40 MG: 40 TABLET, DELAYED RELEASE ORAL at 08:49

## 2019-02-11 RX ADMIN — VITAMIN D, TAB 1000IU (100/BT) 2000 UNITS: 25 TAB at 08:49

## 2019-02-11 RX ADMIN — LITHIUM CARBONATE 300 MG: 300 CAPSULE, GELATIN COATED ORAL at 08:49

## 2019-02-11 RX ADMIN — ALPRAZOLAM 0.5 MG: 0.5 TABLET ORAL at 20:47

## 2019-02-11 RX ADMIN — HYDROXYZINE HYDROCHLORIDE 50 MG: 50 TABLET, FILM COATED ORAL at 20:47

## 2019-02-11 RX ADMIN — LITHIUM CARBONATE 300 MG: 300 CAPSULE, GELATIN COATED ORAL at 20:47

## 2019-02-11 RX ADMIN — ALPRAZOLAM 0.5 MG: 0.5 TABLET ORAL at 08:23

## 2019-02-12 LAB
LITHIUM DATE LAST DOSE: ABNORMAL
LITHIUM DOSE AMOUNT: ABNORMAL
LITHIUM DOSE TIME: ABNORMAL
LITHIUM LEVEL: 0.3 MMOL/L (ref 0.6–1.2)

## 2019-02-12 PROCEDURE — 80178 ASSAY OF LITHIUM: CPT

## 2019-02-12 PROCEDURE — 99232 SBSQ HOSP IP/OBS MODERATE 35: CPT | Performed by: NURSE PRACTITIONER

## 2019-02-12 PROCEDURE — 6370000000 HC RX 637 (ALT 250 FOR IP): Performed by: PSYCHIATRY & NEUROLOGY

## 2019-02-12 PROCEDURE — 6370000000 HC RX 637 (ALT 250 FOR IP): Performed by: NURSE PRACTITIONER

## 2019-02-12 PROCEDURE — 1240000000 HC EMOTIONAL WELLNESS R&B

## 2019-02-12 PROCEDURE — 36415 COLL VENOUS BLD VENIPUNCTURE: CPT

## 2019-02-12 RX ADMIN — HYDROXYZINE HYDROCHLORIDE 50 MG: 50 TABLET, FILM COATED ORAL at 20:53

## 2019-02-12 RX ADMIN — VITAMIN D, TAB 1000IU (100/BT) 2000 UNITS: 25 TAB at 13:31

## 2019-02-12 RX ADMIN — LURASIDONE HYDROCHLORIDE 40 MG: 40 TABLET, FILM COATED ORAL at 19:39

## 2019-02-12 RX ADMIN — PANTOPRAZOLE SODIUM 40 MG: 40 TABLET, DELAYED RELEASE ORAL at 08:56

## 2019-02-12 RX ADMIN — ALPRAZOLAM 0.5 MG: 0.5 TABLET ORAL at 08:56

## 2019-02-12 RX ADMIN — LITHIUM CARBONATE 300 MG: 300 CAPSULE, GELATIN COATED ORAL at 20:53

## 2019-02-12 RX ADMIN — ALPRAZOLAM 0.5 MG: 0.5 TABLET ORAL at 14:32

## 2019-02-12 RX ADMIN — ALPRAZOLAM 0.5 MG: 0.5 TABLET ORAL at 20:53

## 2019-02-12 RX ADMIN — HYDROXYZINE HYDROCHLORIDE 50 MG: 50 TABLET, FILM COATED ORAL at 13:33

## 2019-02-12 RX ADMIN — FLUOXETINE HYDROCHLORIDE 60 MG: 20 CAPSULE ORAL at 08:56

## 2019-02-12 RX ADMIN — LITHIUM CARBONATE 300 MG: 300 CAPSULE, GELATIN COATED ORAL at 08:55

## 2019-02-13 VITALS
DIASTOLIC BLOOD PRESSURE: 57 MMHG | HEART RATE: 67 BPM | WEIGHT: 206 LBS | RESPIRATION RATE: 14 BRPM | HEIGHT: 67 IN | BODY MASS INDEX: 32.33 KG/M2 | SYSTOLIC BLOOD PRESSURE: 104 MMHG | TEMPERATURE: 97.9 F | OXYGEN SATURATION: 98 %

## 2019-02-13 PROBLEM — E28.2 PCOS (POLYCYSTIC OVARIAN SYNDROME): Status: ACTIVE | Noted: 2018-09-12

## 2019-02-13 PROBLEM — L70.0 ACNE VULGARIS: Status: ACTIVE | Noted: 2018-08-29

## 2019-02-13 PROCEDURE — 6370000000 HC RX 637 (ALT 250 FOR IP): Performed by: PSYCHIATRY & NEUROLOGY

## 2019-02-13 PROCEDURE — 6370000000 HC RX 637 (ALT 250 FOR IP): Performed by: NURSE PRACTITIONER

## 2019-02-13 PROCEDURE — 5130000000 HC BRIDGE APPOINTMENT

## 2019-02-13 PROCEDURE — 99239 HOSP IP/OBS DSCHRG MGMT >30: CPT | Performed by: NURSE PRACTITIONER

## 2019-02-13 RX ORDER — FLUOXETINE HYDROCHLORIDE 20 MG/1
60 CAPSULE ORAL DAILY
Qty: 14 CAPSULE | Refills: 0 | Status: ON HOLD | OUTPATIENT
Start: 2019-02-14 | End: 2019-04-15 | Stop reason: DRUGHIGH

## 2019-02-13 RX ADMIN — ALPRAZOLAM 0.5 MG: 0.5 TABLET ORAL at 09:24

## 2019-02-13 RX ADMIN — LITHIUM CARBONATE 300 MG: 300 CAPSULE, GELATIN COATED ORAL at 09:24

## 2019-02-13 RX ADMIN — FLUOXETINE HYDROCHLORIDE 60 MG: 20 CAPSULE ORAL at 09:24

## 2019-02-13 RX ADMIN — PANTOPRAZOLE SODIUM 40 MG: 40 TABLET, DELAYED RELEASE ORAL at 09:24

## 2019-02-13 RX ADMIN — VITAMIN D, TAB 1000IU (100/BT) 2000 UNITS: 25 TAB at 09:25

## 2019-04-15 ENCOUNTER — HOSPITAL ENCOUNTER (INPATIENT)
Age: 24
LOS: 9 days | Discharge: HOME | DRG: 885 | End: 2019-04-24
Attending: EMERGENCY MEDICINE | Admitting: PSYCHIATRY & NEUROLOGY
Payer: COMMERCIAL

## 2019-04-15 DIAGNOSIS — R45.851 DEPRESSION WITH SUICIDAL IDEATION: Primary | ICD-10-CM

## 2019-04-15 DIAGNOSIS — F31.9 BIPOLAR 1 DISORDER, DEPRESSED (HCC): Chronic | ICD-10-CM

## 2019-04-15 DIAGNOSIS — F32.A DEPRESSION WITH SUICIDAL IDEATION: Primary | ICD-10-CM

## 2019-04-15 PROBLEM — F33.2 SEVERE RECURRENT MAJOR DEPRESSION WITHOUT PSYCHOTIC FEATURES (HCC): Status: ACTIVE | Noted: 2019-04-15

## 2019-04-15 LAB
-: ABNORMAL
AMORPHOUS: ABNORMAL
AMPHETAMINE SCREEN URINE: NEGATIVE
BACTERIA: ABNORMAL
BARBITURATE SCREEN URINE: NEGATIVE
BENZODIAZEPINE SCREEN, URINE: NEGATIVE
BILIRUBIN URINE: NEGATIVE
BUPRENORPHINE URINE: NORMAL
CANNABINOID SCREEN URINE: NEGATIVE
CASTS UA: ABNORMAL /LPF
COCAINE METABOLITE, URINE: NEGATIVE
COLOR: YELLOW
COMMENT UA: ABNORMAL
CRYSTALS, UA: ABNORMAL /HPF
EPITHELIAL CELLS UA: ABNORMAL /HPF
GLUCOSE URINE: NEGATIVE
HCG(URINE) PREGNANCY TEST: NEGATIVE
KETONES, URINE: NEGATIVE
LEUKOCYTE ESTERASE, URINE: ABNORMAL
MDMA URINE: NORMAL
METHADONE SCREEN, URINE: NEGATIVE
METHAMPHETAMINE, URINE: NORMAL
MUCUS: ABNORMAL
NITRITE, URINE: NEGATIVE
OPIATES, URINE: NEGATIVE
OTHER OBSERVATIONS UA: ABNORMAL
OXYCODONE SCREEN URINE: NEGATIVE
PH UA: 7.5 (ref 5–8)
PHENCYCLIDINE, URINE: NEGATIVE
PROPOXYPHENE, URINE: NORMAL
PROTEIN UA: NEGATIVE
RBC UA: ABNORMAL /HPF
RENAL EPITHELIAL, UA: ABNORMAL /HPF
SPECIFIC GRAVITY UA: 1.01 (ref 1–1.03)
TEST INFORMATION: NORMAL
TRICHOMONAS: ABNORMAL
TRICYCLIC ANTIDEPRESSANTS, UR: NORMAL
TURBIDITY: ABNORMAL
URINE HGB: NEGATIVE
UROBILINOGEN, URINE: NORMAL
WBC UA: ABNORMAL /HPF
YEAST: ABNORMAL

## 2019-04-15 PROCEDURE — 87086 URINE CULTURE/COLONY COUNT: CPT

## 2019-04-15 PROCEDURE — 6370000000 HC RX 637 (ALT 250 FOR IP): Performed by: REGISTERED NURSE

## 2019-04-15 PROCEDURE — 84703 CHORIONIC GONADOTROPIN ASSAY: CPT

## 2019-04-15 PROCEDURE — 1240000000 HC EMOTIONAL WELLNESS R&B

## 2019-04-15 PROCEDURE — 99285 EMERGENCY DEPT VISIT HI MDM: CPT

## 2019-04-15 PROCEDURE — 81001 URINALYSIS AUTO W/SCOPE: CPT

## 2019-04-15 PROCEDURE — 80307 DRUG TEST PRSMV CHEM ANLYZR: CPT

## 2019-04-15 RX ORDER — MAGNESIUM HYDROXIDE/ALUMINUM HYDROXICE/SIMETHICONE 120; 1200; 1200 MG/30ML; MG/30ML; MG/30ML
30 SUSPENSION ORAL EVERY 6 HOURS PRN
Status: DISCONTINUED | OUTPATIENT
Start: 2019-04-15 | End: 2019-04-24 | Stop reason: HOSPADM

## 2019-04-15 RX ORDER — ALPRAZOLAM 1 MG/1
1 TABLET ORAL 2 TIMES DAILY PRN
Status: ON HOLD | COMMUNITY
End: 2019-04-24 | Stop reason: HOSPADM

## 2019-04-15 RX ORDER — ACETAMINOPHEN 325 MG/1
650 TABLET ORAL EVERY 4 HOURS PRN
Status: DISCONTINUED | OUTPATIENT
Start: 2019-04-15 | End: 2019-04-24 | Stop reason: HOSPADM

## 2019-04-15 RX ORDER — ESCITALOPRAM OXALATE 10 MG/1
10 TABLET ORAL DAILY
Status: ON HOLD | COMMUNITY
End: 2019-04-24 | Stop reason: HOSPADM

## 2019-04-15 RX ORDER — PANTOPRAZOLE SODIUM 40 MG/1
40 TABLET, DELAYED RELEASE ORAL DAILY
Status: ON HOLD | COMMUNITY
End: 2019-04-24 | Stop reason: HOSPADM

## 2019-04-15 RX ORDER — DICYCLOMINE HCL 20 MG
20 TABLET ORAL 3 TIMES DAILY
Status: ON HOLD | COMMUNITY
End: 2019-04-24 | Stop reason: HOSPADM

## 2019-04-15 RX ORDER — LITHIUM CARBONATE 300 MG/1
300 CAPSULE ORAL 2 TIMES DAILY WITH MEALS
Status: ON HOLD | COMMUNITY
End: 2019-04-24 | Stop reason: HOSPADM

## 2019-04-15 RX ORDER — BENZTROPINE MESYLATE 2 MG/1
2 TABLET ORAL EVERY MORNING
Status: ON HOLD | COMMUNITY
End: 2019-04-24 | Stop reason: HOSPADM

## 2019-04-15 RX ORDER — TRAZODONE HYDROCHLORIDE 50 MG/1
50 TABLET ORAL NIGHTLY PRN
Status: DISCONTINUED | OUTPATIENT
Start: 2019-04-15 | End: 2019-04-24 | Stop reason: HOSPADM

## 2019-04-15 RX ORDER — TRAZODONE HYDROCHLORIDE 50 MG/1
25 TABLET ORAL NIGHTLY
Status: ON HOLD | COMMUNITY
End: 2019-04-24 | Stop reason: HOSPADM

## 2019-04-15 RX ORDER — BENZTROPINE MESYLATE 1 MG/ML
2 INJECTION INTRAMUSCULAR; INTRAVENOUS 2 TIMES DAILY PRN
Status: DISCONTINUED | OUTPATIENT
Start: 2019-04-15 | End: 2019-04-24 | Stop reason: HOSPADM

## 2019-04-15 RX ORDER — VILAZODONE HYDROCHLORIDE 20 MG/1
20 TABLET ORAL DAILY
Status: ON HOLD | COMMUNITY
End: 2019-04-24 | Stop reason: HOSPADM

## 2019-04-15 RX ORDER — HYDROXYZINE HYDROCHLORIDE 25 MG/1
25 TABLET, FILM COATED ORAL 3 TIMES DAILY PRN
Status: DISCONTINUED | OUTPATIENT
Start: 2019-04-15 | End: 2019-04-24 | Stop reason: HOSPADM

## 2019-04-15 RX ORDER — LORAZEPAM 1 MG/1
1 TABLET ORAL ONCE
Status: DISCONTINUED | OUTPATIENT
Start: 2019-04-15 | End: 2019-04-17

## 2019-04-15 RX ORDER — LITHIUM CARBONATE 150 MG/1
150 CAPSULE ORAL 2 TIMES DAILY WITH MEALS
Status: ON HOLD | COMMUNITY
End: 2019-04-24 | Stop reason: HOSPADM

## 2019-04-15 RX ORDER — FLUOXETINE 10 MG/1
30 TABLET, FILM COATED ORAL DAILY
Status: ON HOLD | COMMUNITY
End: 2019-04-24 | Stop reason: HOSPADM

## 2019-04-15 RX ADMIN — ACETAMINOPHEN 650 MG: 325 TABLET, FILM COATED ORAL at 16:04

## 2019-04-15 RX ADMIN — HYDROXYZINE HYDROCHLORIDE 25 MG: 25 TABLET, FILM COATED ORAL at 16:06

## 2019-04-15 ASSESSMENT — LIFESTYLE VARIABLES: HISTORY_ALCOHOL_USE: NO

## 2019-04-15 ASSESSMENT — SLEEP AND FATIGUE QUESTIONNAIRES
DO YOU USE A SLEEP AID: NO
AVERAGE NUMBER OF SLEEP HOURS: 9
DO YOU HAVE DIFFICULTY SLEEPING: NO

## 2019-04-15 ASSESSMENT — PAIN DESCRIPTION - LOCATION
LOCATION: BACK
LOCATION: BACK

## 2019-04-15 ASSESSMENT — PATIENT HEALTH QUESTIONNAIRE - PHQ9: SUM OF ALL RESPONSES TO PHQ QUESTIONS 1-9: 9

## 2019-04-15 ASSESSMENT — PAIN SCALES - GENERAL
PAINLEVEL_OUTOF10: 3
PAINLEVEL_OUTOF10: 0
PAINLEVEL_OUTOF10: 0

## 2019-04-15 ASSESSMENT — PAIN DESCRIPTION - PAIN TYPE
TYPE: ACUTE PAIN
TYPE: ACUTE PAIN

## 2019-04-15 NOTE — CARE COORDINATION
SW unable to meet with Pt to complete psychosocial assessment. SW will complete psychosocial assessment at a later time.

## 2019-04-15 NOTE — ED PROVIDER NOTES
[address] ED  eMERGENCY dEPARTMENT eNCOUnter      Pt Name: Sarah Marmolejo  MRN: [id number]  YOB: 1995  Date of evaluation: 4/15/19  PCP: No primary care provider on file. CHIEF COMPLAINT:   Chief Complaint   Patient presents with    Suicidal     HISTORY OF PRESENT ILLNESS    Sarah Marmolejo is a 26 y.o. female who presents with chief complaint of suicidal thoughts. Patient states over the past several weeks she has felt very depressed and has a plan to kill herself by stabbing herself. Denies any self-harm. Has been smoking marijuana but denies any other alcohol or drug use. States she has felt like this in the past.  She was recently admitted several months ago for the same issue. Has been taking her medication as prescribed. No visual or auditory hallucinations. Symptoms are acute. Symptoms are severe. Nothing makes her symptoms better or worse. Patient has no other complaints at this time. REVIEW OF SYSTEMS       Constitutional: Denies recent fever, chills. Neck: No neck pain. Respiratory: Denies recent shortness of breath. Cardiac:  Denies recent chest pain. GI: Denies any recent abdominal pain nausea or vomiting. : Denies dysuria. Musculoskeletal: Denies focal weakness. Neurologic: Denies headache or focal weakness. Skin:  Denies any rash. Psychiatric: Positive for suicidal ideations    Negative in 10 essential Systems except as mentioned above and in the HPI. PAST MEDICAL HISTORY   PMH:  has a past medical history of Depression, Gastritis, PCOS (polycystic ovarian syndrome), Psychiatric problem, and Reflex sympathetic dystrophy. Surgical History:  has a past surgical history that includes ovarian cyst removal.  Social History:  reports that she has never smoked. She has never used smokeless tobacco. She reports that she does not drink alcohol or use drugs.   Family History: Noncontributory at this time  Psychiatric History: See PMH  Allergies:is allergic to phenergan [promethazine hcl]. PHYSICAL EXAM     INITIAL VITALS: BP: 118/75  Pulse: 85  Resp: 16  Temp: 97.3 °F (36.3 °C) SpO2: 96 %     Constitutional:  Well developed, no acute distress   Eyes:  Pupils equal and readily reactive to light  HENT:  Atraumatic, external ears normal, nose normal, oropharynx moist. Neck- supple    Respiratory:  Clear to auscultation bilaterally with good air exchange  Cardiovascular:  RRR with normal S1 and S2  GI:  Soft, nondistended and nontender   Musculoskeletal:  No edema, no tenderness, no deformities  Integument:  No rash  Neurologic:  Alert & oriented x 4, no focal deficits noted   Psychiatric: Tearful      EMERGENCY DEPARTMENT COURSE     26-year-old female presents with suicidal ideations with a plan to stab herself. She is afebrile and nontoxic. Vital signs are normal.  She is not in acute distress. She has no other medical complaints. She is medically cleared at this time for psychiatric evaluation and likely admission. FINAL IMPRESSION     1. Depression with suicidal ideation          DISPOSITION:  DISPOSITION Decision To Admit 04/15/2019 09:58:58 AM        PATIENT REFERRED TO:  No follow-up provider specified. DISCHARGE MEDICATIONS:  New Prescriptions    No medications on file       (Please note that portions of this note were completed with a voice recognition program. Efforts were made to edit the dictations but occasionally words are mis-transcribed.  Whenever words are used in this note in any gender, they shall be construed as though they were used in the gender appropriate to the circumstances; and whenever words are used in this note in the singular or plural form, they shall be construed as though they were used in the form appropriate to the circumstances.)    Yasir Webster DO  Attending Emergency Medicine Physician        Yasir Webster DO  04/15/19 1000

## 2019-04-15 NOTE — ED NOTES
Voluntary and bed request faxed to the [facility]. Admission RN notified.

## 2019-04-15 NOTE — GROUP NOTE
Group Therapy Note    Date: April 15    Group Start Time: 1600  Group End Time: 36  Group Topic: Healthy Living/Wellness    [facility]    Juliane Varma LPN        Group Therapy Note    Attendees: 06         Patient's Goal:  Identify coping skills and triggers    Notes:  Pt cooperative and interactive    Status After Intervention:  Improved    Participation Level: Interactive    Participation Quality: Sharing      Speech:  normal      Thought Process/Content: Logical      Affective Functioning: Congruent      Mood: depressed      Level of consciousness:  Alert      Response to Learning: Able to retain information      Endings: None Reported    Modes of Intervention: Education      Discipline Responsible: Licensed Practical Nurse      Signature:   Juliane Varma LPN

## 2019-04-15 NOTE — PROGRESS NOTES
Clinical Pharmacist Medication Reconciliation    List of medications patient is currently taking is complete. Source of medications in list is [facility] [phone]. The following medications were added to the home medication list:  Benztropine 2mg daily each morning  Escitalopram 10mg daily*  Trazodone 25mg nightly at bedtime  Vilazodone 20mg daily    The following medications were changed on the home medication list:  Alprazolam 0.5mg TID prn increased to 1mg BID prn  Dicyclomine 20mg QID prn decreased to 20mg TID scheduled  Fluoxetine 60mg daily decreased to 30mg daily*  Lithium 300mg BID increased to 450mg BID    The following medications were removed from the home medication list:  Lurasidone 40mg daily    * Patient appears to be titrating dose of fluoxetine down from 60mg daily while titrating dose of escitalopram up. Please let me know if you have any questions about this encounter. Thanks!     Ness Ferraro, PharmD Candidate  4/15/2019  1:16 PM

## 2019-04-15 NOTE — ED NOTES
Pt assigned to [facility] room [facility].

## 2019-04-15 NOTE — ED NOTES
Pt escorted to the [facility] via [facility] staff. Pt  Belongings went with her.

## 2019-04-15 NOTE — GROUP NOTE
Group Therapy Note    Date: April 15    Group Start Time: 1330  Group End Time: 8519  Group Topic: Recreational    [facility]    Timmy Pandya        Patient's Goal:  Pt will demonstarate increased interpersonal interaction     Notes:      Status After Intervention:  Unchanged    Participation Level:  Active Listener and Interactive    Participation Quality: Appropriate, Attentive and Sharing      Speech:  normal      Thought Process/Content: Logical      Affective Functioning: Constricted/Restricted      Mood: Stable      Level of consciousness:  Alert, Oriented x4 and Attentive      Response to Learning: Able to verbalize current knowledge/experience, Able to verbalize/acknowledge new learning, Able to retain information and Progressing to goal      Endings: None Reported    Modes of Intervention: Education, Support, Socialization, Exploration, Clarifying, Problem-solving and Activity      Discipline Responsible: Psychoeducational Specialist      Signature:  Timmy Pandya

## 2019-04-15 NOTE — BH NOTE
`[facility]  Admission Note     Admission Type:   Admission Type: Voluntary    Reason for admission:  Reason for Admission: Depression with suicidal ideation to cut self.     PATIENT STRENGTHS:  Strengths: Positive Support, No significant Physical Illness, Communication    Patient Strengths and Limitations:  Limitations: Lacks leisure interests, General negative or hopeless attitude about future/recovery, Apathetic / unmotivated, Hopeless about future    Addictive Behavior:   Addictive Behavior  In the past 3 months, have you felt or has someone told you that you have a problem with:  : None  Do you have a history of Chemical Use?: No  Do you have a history of Alcohol Use?: No  Do you have a history of Street Drug Abuse?: No  Histroy of Prescripton Drug Abuse?: No    Medical Problems:   Past Medical History:   Diagnosis Date    Depression     Gastritis     PCOS (polycystic ovarian syndrome)     Psychiatric problem     Reflex sympathetic dystrophy 7/19/2005       Status EXAM:  Status and Exam  Normal: No  Facial Expression: Flat  Affect: Blunt  Interview Behavior: Cooperative  Preception: [place] to Person, [place] to Time, [place] to Place, [place] to Situation  Attention:Normal: Yes  Thought Processes: Circumstantial  Thought Content:Normal: Yes  Delusions: No  Memory:Normal: Yes  Insight and Judgment: No  Insight and Judgment: Poor Judgment, Poor Insight  Present Suicidal Ideation: No  Present Homicidal Ideation: No    Tobacco Screening:  Practical Counseling, on admission, moises X, if applicable and completed (first 3 are required if patient doesn't refuse):            ( )  Recognizing danger situations (included triggers and roadblocks)                    ( )  Coping skills (new ways to manage stress, exercise, relaxation techniques, changing routine, distraction)                                                           ( )  Basic information about quitting (benefits of quitting, techniques in how to quit, available resources  ( ) Referral for counseling faxed to J[facility]                                           ( ) Patient refused counseling  (x ) Patient has not smoked in the last 30 days    Metabolic Screening:    Lab Results   Component Value Date    LABA1C 4.6 02/10/2019       Lab Results   Component Value Date    CHOL 195 02/10/2019    CHOL 157 10/16/2018     Lab Results   Component Value Date    TRIG 173 (H) 02/10/2019    TRIG 155 (H) 10/16/2018     Lab Results   Component Value Date    HDL 41 02/10/2019    HDL 38 (L) 10/16/2018     No components found for: LDLCAL  No results found for: LABVLDL      Body mass index is 32.89 kg/m². BP Readings from Last 2 Encounters:   04/15/19 116/73   02/13/19 (!) 104/57           Pt admitted with followings belongings:  Dentures: None  Vision - Corrective Lenses: Glasses  Hearing Aid: None  Jewelry: Earrings  Clothing: Footwear, Shirt, Pants, Socks, Undergarments (Comment)  Were All Patient Medications Collected?: Not Applicable  Other Valuables: None     Patient's home medications were verified. Patient oriented to surroundings and program expectations and copy of patient rights given. Received admission packet:  yes. Consents reviewed, signed yes. Patient verbalize understanding:  yes.     Patient education on precautions: yes                   Jeanna Shaffer RN

## 2019-04-16 PROBLEM — F31.9 BIPOLAR 1 DISORDER, DEPRESSED (HCC): Status: ACTIVE | Noted: 2019-04-16

## 2019-04-16 LAB
ABSOLUTE EOS #: 0.2 K/UL (ref 0–0.4)
ABSOLUTE IMMATURE GRANULOCYTE: ABNORMAL K/UL (ref 0–0.3)
ABSOLUTE LYMPH #: 1.4 K/UL (ref 1–4.8)
ABSOLUTE MONO #: 0.6 K/UL (ref 0.1–1.3)
ALBUMIN SERPL-MCNC: 3.6 G/DL (ref 3.5–5.2)
ALBUMIN/GLOBULIN RATIO: ABNORMAL (ref 1–2.5)
ALP BLD-CCNC: 80 U/L (ref 35–104)
ALT SERPL-CCNC: 29 U/L (ref 5–33)
ANION GAP SERPL CALCULATED.3IONS-SCNC: 9 MMOL/L (ref 9–17)
AST SERPL-CCNC: 27 U/L
BASOPHILS # BLD: 1 % (ref 0–2)
BASOPHILS ABSOLUTE: 0 K/UL (ref 0–0.2)
BILIRUB SERPL-MCNC: 0.19 MG/DL (ref 0.3–1.2)
BUN BLDV-MCNC: 9 MG/DL (ref 6–20)
BUN/CREAT BLD: ABNORMAL (ref 9–20)
CALCIUM SERPL-MCNC: 9 MG/DL (ref 8.6–10.4)
CHLORIDE BLD-SCNC: 105 MMOL/L (ref 98–107)
CHOLESTEROL/HDL RATIO: 5
CHOLESTEROL: 229 MG/DL
CO2: 28 MMOL/L (ref 20–31)
CREAT SERPL-MCNC: 0.8 MG/DL (ref 0.5–0.9)
CULTURE: NORMAL
DIFFERENTIAL TYPE: ABNORMAL
EOSINOPHILS RELATIVE PERCENT: 2 % (ref 0–4)
ESTIMATED AVERAGE GLUCOSE: 80 MG/DL
GFR AFRICAN AMERICAN: >60 ML/MIN
GFR NON-AFRICAN AMERICAN: >60 ML/MIN
GFR SERPL CREATININE-BSD FRML MDRD: ABNORMAL ML/MIN/{1.73_M2}
GFR SERPL CREATININE-BSD FRML MDRD: ABNORMAL ML/MIN/{1.73_M2}
GLUCOSE BLD-MCNC: 93 MG/DL (ref 70–99)
HBA1C MFR BLD: 4.4 % (ref 4–6)
HCT VFR BLD CALC: 41.9 % (ref 36–46)
HDLC SERPL-MCNC: 46 MG/DL
HEMOGLOBIN: 13.8 G/DL (ref 12–16)
IMMATURE GRANULOCYTES: ABNORMAL %
LDL CHOLESTEROL: 105 MG/DL (ref 0–130)
LYMPHOCYTES # BLD: 21 % (ref 24–44)
Lab: NORMAL
MCH RBC QN AUTO: 29.4 PG (ref 26–34)
MCHC RBC AUTO-ENTMCNC: 33.1 G/DL (ref 31–37)
MCV RBC AUTO: 89.1 FL (ref 80–100)
MONOCYTES # BLD: 9 % (ref 1–7)
NRBC AUTOMATED: ABNORMAL PER 100 WBC
PDW BLD-RTO: 13.4 % (ref 11.5–14.9)
PLATELET # BLD: 233 K/UL (ref 150–450)
PLATELET ESTIMATE: ABNORMAL
PMV BLD AUTO: 7.6 FL (ref 6–12)
POTASSIUM SERPL-SCNC: 4.3 MMOL/L (ref 3.7–5.3)
RBC # BLD: 4.7 M/UL (ref 4–5.2)
RBC # BLD: ABNORMAL 10*6/UL
SEG NEUTROPHILS: 67 % (ref 36–66)
SEGMENTED NEUTROPHILS ABSOLUTE COUNT: 4.4 K/UL (ref 1.3–9.1)
SODIUM BLD-SCNC: 142 MMOL/L (ref 135–144)
SPECIMEN DESCRIPTION: NORMAL
THYROXINE, FREE: 0.82 NG/DL (ref 0.93–1.7)
TOTAL PROTEIN: 6.2 G/DL (ref 6.4–8.3)
TRIGL SERPL-MCNC: 390 MG/DL
TSH SERPL DL<=0.05 MIU/L-ACNC: 3.19 MIU/L (ref 0.3–5)
VLDLC SERPL CALC-MCNC: ABNORMAL MG/DL (ref 1–30)
WBC # BLD: 6.6 K/UL (ref 3.5–11)
WBC # BLD: ABNORMAL 10*3/UL

## 2019-04-16 PROCEDURE — 1240000000 HC EMOTIONAL WELLNESS R&B

## 2019-04-16 PROCEDURE — 6370000000 HC RX 637 (ALT 250 FOR IP): Performed by: REGISTERED NURSE

## 2019-04-16 PROCEDURE — 36415 COLL VENOUS BLD VENIPUNCTURE: CPT

## 2019-04-16 PROCEDURE — 85025 COMPLETE CBC W/AUTO DIFF WBC: CPT

## 2019-04-16 PROCEDURE — 80053 COMPREHEN METABOLIC PANEL: CPT

## 2019-04-16 PROCEDURE — 84443 ASSAY THYROID STIM HORMONE: CPT

## 2019-04-16 PROCEDURE — 6370000000 HC RX 637 (ALT 250 FOR IP): Performed by: PSYCHIATRY & NEUROLOGY

## 2019-04-16 PROCEDURE — 90792 PSYCH DIAG EVAL W/MED SRVCS: CPT | Performed by: PSYCHIATRY & NEUROLOGY

## 2019-04-16 PROCEDURE — 84439 ASSAY OF FREE THYROXINE: CPT

## 2019-04-16 PROCEDURE — 80061 LIPID PANEL: CPT

## 2019-04-16 PROCEDURE — 83036 HEMOGLOBIN GLYCOSYLATED A1C: CPT

## 2019-04-16 RX ORDER — LITHIUM CARBONATE 300 MG/1
300 CAPSULE ORAL 2 TIMES DAILY WITH MEALS
Status: DISCONTINUED | OUTPATIENT
Start: 2019-04-16 | End: 2019-04-16 | Stop reason: SDUPTHER

## 2019-04-16 RX ORDER — LITHIUM CARBONATE 150 MG/1
150 CAPSULE ORAL 2 TIMES DAILY WITH MEALS
Status: DISCONTINUED | OUTPATIENT
Start: 2019-04-16 | End: 2019-04-16 | Stop reason: SDUPTHER

## 2019-04-16 RX ORDER — PANTOPRAZOLE SODIUM 40 MG/1
40 TABLET, DELAYED RELEASE ORAL DAILY
Status: DISCONTINUED | OUTPATIENT
Start: 2019-04-16 | End: 2019-04-24 | Stop reason: HOSPADM

## 2019-04-16 RX ORDER — LACTOBACILLUS RHAMNOSUS GG 10B CELL
1 CAPSULE ORAL 2 TIMES DAILY WITH MEALS
Status: DISCONTINUED | OUTPATIENT
Start: 2019-04-16 | End: 2019-04-24 | Stop reason: HOSPADM

## 2019-04-16 RX ORDER — VILAZODONE HYDROCHLORIDE 40 MG/1
40 TABLET ORAL DAILY
Status: DISCONTINUED | OUTPATIENT
Start: 2019-04-16 | End: 2019-04-24 | Stop reason: HOSPADM

## 2019-04-16 RX ORDER — BENZTROPINE MESYLATE 2 MG/1
2 TABLET ORAL EVERY MORNING
Status: DISCONTINUED | OUTPATIENT
Start: 2019-04-16 | End: 2019-04-24 | Stop reason: HOSPADM

## 2019-04-16 RX ORDER — TRAZODONE HYDROCHLORIDE 50 MG/1
25 TABLET ORAL NIGHTLY
Status: DISCONTINUED | OUTPATIENT
Start: 2019-04-16 | End: 2019-04-24 | Stop reason: HOSPADM

## 2019-04-16 RX ORDER — DICYCLOMINE HCL 20 MG
20 TABLET ORAL 3 TIMES DAILY
Status: DISCONTINUED | OUTPATIENT
Start: 2019-04-16 | End: 2019-04-24 | Stop reason: HOSPADM

## 2019-04-16 RX ADMIN — VILAZODONE HYDROCHLORIDE 40 MG: 40 TABLET ORAL at 13:09

## 2019-04-16 RX ADMIN — DICYCLOMINE HYDROCHLORIDE 20 MG: 20 TABLET ORAL at 21:08

## 2019-04-16 RX ADMIN — LITHIUM CARBONATE 450 MG: 300 CAPSULE, GELATIN COATED ORAL at 17:55

## 2019-04-16 RX ADMIN — HYDROXYZINE HYDROCHLORIDE 25 MG: 25 TABLET, FILM COATED ORAL at 21:08

## 2019-04-16 RX ADMIN — BENZTROPINE MESYLATE 2 MG: 2 TABLET ORAL at 13:09

## 2019-04-16 RX ADMIN — PANTOPRAZOLE SODIUM 40 MG: 40 TABLET, DELAYED RELEASE ORAL at 13:09

## 2019-04-16 RX ADMIN — VITAMIN D, TAB 1000IU (100/BT) 2000 UNITS: 25 TAB at 17:55

## 2019-04-16 RX ADMIN — Medication 1 CAPSULE: at 17:55

## 2019-04-16 RX ADMIN — HYDROXYZINE HYDROCHLORIDE 25 MG: 25 TABLET, FILM COATED ORAL at 08:28

## 2019-04-16 RX ADMIN — DICYCLOMINE HYDROCHLORIDE 20 MG: 20 TABLET ORAL at 13:09

## 2019-04-16 RX ADMIN — TRAZODONE HYDROCHLORIDE 25 MG: 50 TABLET ORAL at 21:11

## 2019-04-16 ASSESSMENT — LIFESTYLE VARIABLES: HISTORY_ALCOHOL_USE: NO

## 2019-04-16 ASSESSMENT — PATIENT HEALTH QUESTIONNAIRE - PHQ9: SUM OF ALL RESPONSES TO PHQ QUESTIONS 1-9: 14

## 2019-04-16 ASSESSMENT — SLEEP AND FATIGUE QUESTIONNAIRES
AVERAGE NUMBER OF SLEEP HOURS: 9
DO YOU USE A SLEEP AID: NO
DO YOU HAVE DIFFICULTY SLEEPING: NO

## 2019-04-16 NOTE — GROUP NOTE
Group Therapy Note    Date: April 16    Group Start Time: 0900  Group End Time: 9322  Group Topic: Healthy Living/Wellness    [facility]        Group Therapy Note    Attendees: 6/16         Patient's Goal:  Increase knowledge about community resources, [facility]    Notes:       Status After Intervention:  Improved    Participation Level:  Active Listener and Interactive    Participation Quality: Appropriate, Attentive and Sharing      Speech:  normal      Thought Process/Content: Logical      Affective Functioning: Congruent      Mood: euphoric      Level of consciousness:  Alert and Oriented x4      Response to Learning: Able to verbalize/acknowledge new learning      Endings: None Reported    Modes of Intervention: Education, Support and Socialization      Discipline Responsible: Psychoeducational Specialist      Signature:  He Hatfield

## 2019-04-16 NOTE — H&P
HISTORY and [facility] LA[facility]A [facility] [address]       NAME:  Janusz Jones  MRN: [id number]   YOB: 1995   Date: 4/16/2019   Age: 26 y.o. Gender: female     COMPLAINT AND PRESENT HISTORY:      Jnausz Jones is 26 y.o.,  female, admitted because of depression/ anxiety. Pt has suicidal ideation, Pt has plans to cut self. Pt denies any homicidal ideation. Pt has no history of previous suicide attempts. Pt feels hopeless, helpless, worthless, lack of interest, loss of energy. Poor insight. Pt has fair sleep, appetite, poor concentration and memory. No current auditory, visual or tactile hallucinations. Patient denies any current alcohol or substance abuse. Patient lives with her mother, is on disability. Pt has been compliant with the psychiatric medications.       DIAGNOSTIC RESULTS   Labs:  CBC:   Recent Labs     04/16/19  0733   WBC 6.6   HGB 13.8        BMP:    Recent Labs     04/16/19  0733      K 4.3      CO2 28   BUN 9   CREATININE 0.80   GLUCOSE 93     Hepatic:   Recent Labs     04/16/19  0733   AST 27   ALT 29   BILITOT 0.19*   ALKPHOS 80     Lipids:   Recent Labs     04/16/19  0733   CHOL 229*   HDL 46     U/A:  Lab Results   Component Value Date    COLORU YELLOW 04/15/2019    WBCUA 0 TO 2 04/15/2019    RBCUA 0 TO 2 04/15/2019    MUCUS NOT REPORTED 04/15/2019    BACTERIA FEW 04/15/2019    SPECGRAV 1.012 04/15/2019    LEUKOCYTESUR SMALL 04/15/2019    GLUCOSEU NEGATIVE 04/15/2019    AMORPHOUS NOT REPORTED 04/15/2019       PAST MEDICAL HISTORY     Past Medical History:   Diagnosis Date    Depression     Gastritis     PCOS (polycystic ovarian syndrome)     Psychiatric problem     Reflex sympathetic dystrophy 7/19/2005       Pt denies any history of Diabetes mellitus type 2, hypertension, stroke, heart disease, COPD, Asthma, HLD, Cancer, Seizures,Thyroid disease, Kidney Disease, Hepatitis, TB.    SURGICAL HISTORY       Past Surgical History:   Procedure Laterality Date    OVARIAN CYST REMOVAL         FAMILY HISTORY       Family History   Problem Relation Age of Onset    Coronary Art Dis Mother     Hypertension Mother     Bipolar Disorder Mother     Anxiety Disorder Mother     Cancer Maternal Grandmother         Colon       SOCIAL HISTORY       Social History     Socioeconomic History    Marital status: Single     Spouse name: None    Number of children: None    Years of education: None    Highest education level: None   Occupational History    None   Social Needs    Financial resource strain: None    Food insecurity:     Worry: None     Inability: None    Transportation needs:     Medical: None     Non-medical: None   Tobacco Use    Smoking status: Never Smoker    Smokeless tobacco: Never Used   Substance and Sexual Activity    Alcohol use: No    Drug use: No    Sexual activity: None   Lifestyle    Physical activity:     Days per week: None     Minutes per session: None    Stress: None   Relationships    Social connections:     Talks on phone: None     Gets together: None     Attends Adventism service: None     Active member of club or organization: None     Attends meetings of clubs or organizations: None     Relationship status: None    Intimate partner violence:     Fear of current or ex partner: None     Emotionally abused: None     Physically abused: None     Forced sexual activity: None   Other Topics Concern    None   Social History Narrative    None           REVIEW OF SYSTEMS      Allergies   Allergen Reactions    Phenergan [Promethazine Hcl]      Muscle spasm       No current facility-administered medications on file prior to encounter. Current Outpatient Medications on File Prior to Encounter   Medication Sig Dispense Refill    ALPRAZolam (XANAX) 1 MG tablet Take 1 mg by mouth 2 times daily as needed for Sleep.       lithium 300 MG capsule Take 300 mg by mouth 2 times daily (with meals) Take with Lithium Carbonate 150mg to equal 450mg twice daily.  lithium 150 MG capsule Take 150 mg by mouth 2 times daily (with meals) Take with Lithium Carbonate 300mg to equal 450mg twice daily.  dicyclomine (BENTYL) 20 MG tablet Take 20 mg by mouth 3 times daily      vilazodone HCl (VILAZODONE HCL) 20 MG TABS Take 20 mg by mouth daily      tazarotene (TAZORAC) 0.05 % cream Apply topically daily to affected area      pantoprazole (PROTONIX) 40 MG tablet Take 40 mg by mouth daily      traZODone (DESYREL) 50 MG tablet Take 25 mg by mouth nightly      benztropine (COGENTIN) 2 MG tablet Take 2 mg by mouth every morning      FLUoxetine (PROZAC) 10 MG tablet Take 30 mg by mouth daily Patient appears to be tapering down from Fluoxetine 60mg daily to be converted to Escitalopram.      escitalopram (LEXAPRO) 10 MG tablet Take 10 mg by mouth daily Patient appears to be tapering up on Escitalopram while tapering down from Fluoxetine 60mg daily.  linaclotide (LINZESS) 290 MCG CAPS capsule Take 290 mcg by mouth every morning (before breakfast)      Lactobacillus Rhamnosus, GG, (CULTURELLE PO) Take by mouth 2 times daily (with meals)       vitamin D (CHOLECALCIFEROL) 1000 UNIT TABS tablet Take 2 tablets by mouth daily 14 tablet 0    Norethindrone-Eth Estradiol (ALYACEN 1/35 PO) Take by mouth daily                        General health:  Fairly good. No fever or chills. Skin:  No itching, redness or rash. Head, eyes, ears, nose, throat:  No headache, epistaxis, rhinorrhea hearing loss or sore throat. Neck:  No pain, stiffness or masses. Cardiovascular/Respiratory system:  No chest pain, palpitation, shortness of breath, coughing or expectoration. Gastrointestinal tract: No abdominal pain, nausea, vomiting, diarrhea or constipation. Genitourinary:  No burning on micturition. No hesitancy, urgency, frequency or discoloration of urine. Locomotor:  No bone or joint pains.  No swelling or deformities. Neuropsychiatric:  See HPI. GENERAL PHYSICAL EXAM:     Vitals: /65   Pulse 69   Temp 98.7 °F (37.1 °C) (Oral)   Resp 14   Ht 5' 7\" (1.702 m)   Wt 210 lb (95.3 kg)   SpO2 95%   BMI 32.89 kg/m²  Body mass index is 32.89 kg/m². Pt was examined with a nurse present in the room. GENERAL APPEARANCE:  Alem Whitfield is 26 y. o.   female, moderately obese, nourished, conscious, alert. Does not appear to be distress or pain at this time. SKIN:  Warm, dry, no cyanosis or jaundice. HEAD:  Normocephalic, atraumatic, no swelling or tenderness. EYES:  Pupils equal, reactive to light, Conjunctiva is clear, EOMs intact marc. eyelids WNL. EARS:  No discharge, no marked hearing loss. NOSE:  No rhinorrhea, epistaxis or septal deformity. THROAT:  Not congested. No ulceration bleeding or discharge. NECK:  No stiffness, trachea central.  No palpable masses or L.N.      CHEST:  Symmetrical and equal on expansion. HEART:  Regular rate and rhythm. S1 > S2, No audible murmurs or gallops. LUNGS:  Equal on expansion, normal breath sounds. No adventitious sounds. ABDOMEN:  Obese. Soft on palpation. No localized tenderness. No guarding or rigidity. No palpable organomegaly. LYMPHATICS:  No palpable cervical Lymphadenopathy. LOCOMOTOR, BACK AND SPINE:  No tenderness or deformities. EXTREMITIES:  Symmetrical, no pretibial edema. Jefferys sign negative. No discoloration or ulcerations. NEUROLOGIC:  The patient is conscious, alert, oriented,Cranial nerve II-XII intact, taste was not examined. No apparent focal sensory or motor deficits. Muscle strength equal Marc. No facial droop, tongue protrudes centrally, no slurring of the speech.             PROVISIONAL DIAGNOSES:      Principal Problem:    Major depressive disorder, recurrent episode, severe with anxious distress ([address]  Active Problems:    Severe recurrent major depression without psychotic features ([facility]    Bipolar 1 disorder, depressed [facility]  Resolved Problems:    * No resolved hospital problems.  Pavan Lopez PA-C on 4/16/2019 at 4:43 PM

## 2019-04-16 NOTE — GROUP NOTE
Group Therapy Note    Date: April 16    Group Start Time: 1100  Group End Time: 0472  Group Topic: Recreational    [facility]    Xander Hussein         Patient's Goal:  Pt will demonstrate increased interpersonal interaction      Notes:      Status After Intervention:  Decompensated    Participation Level:  Active Listener and Interactive    Participation Quality: Appropriate, Attentive and Sharing      Speech:  normal      Thought Process/Content: Logical      Affective Functioning: Constricted/Restricted      Mood: depressed      Level of consciousness:  Alert, Oriented x4 and Attentive      Response to Learning: Able to verbalize current knowledge/experience, Able to verbalize/acknowledge new learning, Able to retain information and Progressing to goal      Endings: None Reported    Modes of Intervention: Education, Support, Socialization, Exploration, Clarifying, Problem-solving and Activity      Discipline Responsible: Psychoeducational Specialist      Signature:  Xander Hussein

## 2019-04-16 NOTE — PLAN OF CARE
Problem: Altered Mood, Depressive Behavior:  Goal: Absence of self-harm  Description  Absence of self-harm  4/16/2019 1413 by Airam Barrett  Outcome: Ongoing  Note:   Pt reports fleeting suicidal ideations upon request this morning. Denies any specific plans but reports she is feeling very anxious and overwhelmed. Tearful and focused on what medications she will be on. Reports she was \"trying to use all her coping skills at home but it didn't work, I couldn't hold it together\". Support and reassurance given. Encouraged to attend unit programing and take medications as prescribed. Agrees to seek out staff as needed and before harming self if negative self harm thoughts arise. Q15 minute checks for safety cont.

## 2019-04-16 NOTE — GROUP NOTE
Group Therapy Note    Date: April 16    Group Start Time: 1430  Group End Time: 4351  Group Topic: Psychoeducation    [facility]    Romero Hart         Patient's Goal:  Pt will demonstrate increased interpersonal interaction      Notes:      Status After Intervention:  Unchanged    Participation Level:  Active Listener and Interactive    Participation Quality: Appropriate, Attentive and Sharing      Speech:  normal      Thought Process/Content: Logical      Affective Functioning: Constricted/Restricted      Mood: Stable      Level of consciousness:  Alert, Oriented x4 and Attentive      Response to Learning: Able to verbalize current knowledge/experience, Able to verbalize/acknowledge new learning, Able to retain information and Progressing to goal      Endings: None Reported    Modes of Intervention: Education, Support, Socialization, Exploration, Clarifying, Problem-solving and Activity      Discipline Responsible: Psychoeducational Specialist      Signature:  Romero Hart

## 2019-04-16 NOTE — GROUP NOTE
Group Therapy Note    Date: April 16, 2019    Group Start Time: 5877  Group End Time: 1630  Group Topic: Healthy Living/Wellness Tobacco cessation      Patient refused to attend group at this time despite staff encouragement.      [address] Pineda Lance RN            Signature:  Douglas Mccabe RN

## 2019-04-16 NOTE — GROUP NOTE
Group Therapy Note    Date: April 16    Group Start Time: 1000  Group End Time: 1009  Group Topic: Psychotherapy    [facility]    ISA Agrawal        Group Therapy Note    Attendees: 5         Patient's Goal:  Expression of feeling, coping with mental health symptoms    Notes:      Status After Intervention:  Unchanged    Participation Level:  Active Listener    Participation Quality: Appropriate      Speech:  normal      Thought Process/Content: Logical      Affective Functioning: Congruent      Mood: euthymic      Level of consciousness:  Alert and Oriented x4      Response to Learning: Progressing to goal      Endings: None Reported    Modes of Intervention: Education      Discipline Responsible: /Counselor      Signature:  ISA Agrawal

## 2019-04-16 NOTE — PLAN OF CARE
[facility]  Initial Interdisciplinary Treatment Plan NO      Original treatment plan Date & Time: 4/16/2019 0811    Admission Type:  Admission Type: Voluntary    Reason for admission:   Reason for Admission: Depression with suicidal ideation to cut self.     Estimated Length of Stay:  5-7days  Estimated Discharge Date: to be determined by physician    PATIENT STRENGTHS:  Patient Strengths:Strengths: Positive Support, No significant Physical Illness, Communication  Patient Strengths and Limitations:Limitations: Lacks leisure interests, General negative or hopeless attitude about future/recovery, Apathetic / unmotivated, Hopeless about future  Addictive Behavior: Addictive Behavior  In the past 3 months, have you felt or has someone told you that you have a problem with:  : None  Do you have a history of Chemical Use?: No  Do you have a history of Alcohol Use?: No  Do you have a history of Street Drug Abuse?: No  Histroy of Prescripton Drug Abuse?: No  Medical Problems:  Past Medical History:   Diagnosis Date    Depression     Gastritis     PCOS (polycystic ovarian syndrome)     Psychiatric problem     Reflex sympathetic dystrophy 7/19/2005     Status EXAM:Status and Exam  Normal: No  Facial Expression: Flat, Sad  Affect: Blunt  Level of Consciousness: Alert  Mood:Normal: No  Mood: Depressed, Anxious  Motor Activity:Normal: Yes  Interview Behavior: Cooperative  Preception: [place] to Person  Attention:Normal: Yes  Thought Processes: Circumstantial  Thought Content:Normal: No  Thought Content: Preoccupations  Hallucinations: None  Delusions: No  Memory:Normal: No  Memory: Poor Recent, Poor Remote  Insight and Judgment: No  Insight and Judgment: Poor Judgment, Poor Insight  Present Suicidal Ideation: No(Self-injury history)  Present Homicidal Ideation: No    EDUCATION:   Learner Progress Toward Treatment Goals: reviewed group plans and strategies for care    Method:group therapy, medication compliance, individualized assessments and care planning    Outcome: needs reinforcement    PATIENT GOALS: to be discussed with patient within 72 hours    PLAN/TREATMENT RECOMMENDATIONS:     continue group therapy , medications compliance, goal setting, individualized assessments and care, continue to monitor pt on unit      SHORT-TERM GOALS:   Time frame for Short-Term Goals: 5-7 days    LONG-TERM GOALS:  Time frame for Long-Term Goals: 6 months  Members Present in Team Meeting: See Signature Sheet    Christy Aguilar

## 2019-04-16 NOTE — PLAN OF CARE
Problem: Altered Mood, Depressive Behavior:  Goal: Absence of self-harm  Description  Absence of self-harm  Outcome: Ongoing  Note:   Pt will be absent of self-harm     Problem: Depressive Behavior With or Without Suicide Precautions:  Goal: Participates in care planning  Description  Participates in care planning  Outcome: Ongoing  Note:   Pt will participate in care-planning

## 2019-04-17 LAB
LITHIUM DATE LAST DOSE: ABNORMAL
LITHIUM DOSE AMOUNT: 450
LITHIUM DOSE TIME: 1755
LITHIUM LEVEL: 0.4 MMOL/L (ref 0.6–1.2)

## 2019-04-17 PROCEDURE — 99232 SBSQ HOSP IP/OBS MODERATE 35: CPT | Performed by: PSYCHIATRY & NEUROLOGY

## 2019-04-17 PROCEDURE — 36415 COLL VENOUS BLD VENIPUNCTURE: CPT

## 2019-04-17 PROCEDURE — 80178 ASSAY OF LITHIUM: CPT

## 2019-04-17 PROCEDURE — 1240000000 HC EMOTIONAL WELLNESS R&B

## 2019-04-17 PROCEDURE — 6370000000 HC RX 637 (ALT 250 FOR IP): Performed by: PSYCHIATRY & NEUROLOGY

## 2019-04-17 PROCEDURE — 6370000000 HC RX 637 (ALT 250 FOR IP): Performed by: REGISTERED NURSE

## 2019-04-17 RX ORDER — ALPRAZOLAM 0.5 MG/1
0.5 TABLET ORAL DAILY
Status: DISCONTINUED | OUTPATIENT
Start: 2019-04-17 | End: 2019-04-17

## 2019-04-17 RX ORDER — ALPRAZOLAM 0.5 MG/1
0.5 TABLET ORAL DAILY PRN
Status: DISCONTINUED | OUTPATIENT
Start: 2019-04-17 | End: 2019-04-24 | Stop reason: HOSPADM

## 2019-04-17 RX ORDER — LITHIUM CARBONATE 300 MG/1
600 CAPSULE ORAL 2 TIMES DAILY WITH MEALS
Status: DISCONTINUED | OUTPATIENT
Start: 2019-04-17 | End: 2019-04-23

## 2019-04-17 RX ADMIN — VITAMIN D, TAB 1000IU (100/BT) 2000 UNITS: 25 TAB at 08:21

## 2019-04-17 RX ADMIN — DICYCLOMINE HYDROCHLORIDE 20 MG: 20 TABLET ORAL at 08:20

## 2019-04-17 RX ADMIN — Medication 1 CAPSULE: at 08:21

## 2019-04-17 RX ADMIN — LITHIUM CARBONATE 450 MG: 300 CAPSULE, GELATIN COATED ORAL at 08:20

## 2019-04-17 RX ADMIN — DICYCLOMINE HYDROCHLORIDE 20 MG: 20 TABLET ORAL at 21:36

## 2019-04-17 RX ADMIN — LINACLOTIDE 290 MCG: 145 CAPSULE, GELATIN COATED ORAL at 08:21

## 2019-04-17 RX ADMIN — PANTOPRAZOLE SODIUM 40 MG: 40 TABLET, DELAYED RELEASE ORAL at 08:20

## 2019-04-17 RX ADMIN — DICYCLOMINE HYDROCHLORIDE 20 MG: 20 TABLET ORAL at 14:20

## 2019-04-17 RX ADMIN — LITHIUM CARBONATE 600 MG: 300 CAPSULE, GELATIN COATED ORAL at 17:20

## 2019-04-17 RX ADMIN — BENZTROPINE MESYLATE 2 MG: 2 TABLET ORAL at 08:20

## 2019-04-17 RX ADMIN — VILAZODONE HYDROCHLORIDE 40 MG: 40 TABLET ORAL at 08:20

## 2019-04-17 RX ADMIN — HYDROXYZINE HYDROCHLORIDE 25 MG: 25 TABLET, FILM COATED ORAL at 08:25

## 2019-04-17 RX ADMIN — Medication 1 CAPSULE: at 17:20

## 2019-04-17 NOTE — PLAN OF CARE
Patient has been out in the milieu aloof of peers. Patient is very anxious and admits to also feeling depressed. Patient was compliant with scheduled medications this evening. Patient states she has been eating and sleeping okay. Patient admits to suicidal thoughts with no specific plan. Patient agrees to come talk with staff if having any thoughts to harm herself this shift. 15 min rounds continued for patient safety.

## 2019-04-17 NOTE — PLAN OF CARE
[address] [place] [address]  Day 3 Interdisciplinary Treatment Plan NOTE    Review Date & Time: 4/17/19    Admission Type:   Admission Type: Voluntary    Reason for admission:  Reason for Admission: Depression with suicidal ideation to cut self.   Estimated Length of Stay: 5-7 days  Estimated Discharge Date Update: to be determined by physician    PATIENT STRENGTHS:  Patient Strengths Strengths: No significant Physical Illness, Communication, Medication Compliance  Patient Strengths and Limitations:Limitations: Hopeless about future  Addictive Behavior:Addictive Behavior  In the past 3 months, have you felt or has someone told you that you have a problem with:  : None  Do you have a history of Chemical Use?: No  Do you have a history of Alcohol Use?: No  Do you have a history of Street Drug Abuse?: No  Histroy of Prescripton Drug Abuse?: No  Medical Problems:  Past Medical History:   Diagnosis Date    Depression     Gastritis     PCOS (polycystic ovarian syndrome)     Psychiatric problem     Reflex sympathetic dystrophy 7/19/2005       Risk:  Fall RiskTotal: 77  Fuentes Scale Fuentes Scale Score: 23  BVC Total: 0  Change in scores no Changes to plan of Care no    Status EXAM:   Status and Exam  Normal: No  Facial Expression: Sad, Worried  Affect: Appropriate  Level of Consciousness: Alert  Mood:Normal: No  Mood: Depressed, Anxious, Sad  Motor Activity:Normal: No  Motor Activity: Decreased  Interview Behavior: Cooperative  Preception: [place] to Person, [place] to Time, [place] to Place, [place] to Situation  Attention:Normal: No  Attention: Distractible  Thought Processes: Blocking  Thought Content:Normal: No  Thought Content: Poverty of Content  Hallucinations: None  Delusions: No  Memory:Normal: Yes  Memory: Poor Recent  Insight and Judgment: No  Insight and Judgment: Poor Insight, Poor Judgment  Present Suicidal Ideation: No  Present Homicidal Ideation: No    Daily Assessment Last Entry:   Daily Sleep (WDL): Within Defined Limits         Patient Currently in Pain: Denies  Daily Nutrition (WDL): Within Defined Limits    Patient Monitoring:  Frequency of Checks: 4 times per hour, close    Psychiatric Symptoms:   Depression Symptoms  Depression Symptoms: Feelings of worthlessness, Impaired concentration, Isolative, Loss of interest, Feelings of hopelessess  Anxiety Symptoms  Anxiety Symptoms: Generalized  Hanna Symptoms  Hanna Symptoms: No problems reported or observed. Psychosis Symptoms  Delusion Type: No problems reported or observed. Suicide Risk CSSR-S:  1) Within the past month, have you wished you were dead or wished you could go to sleep and not wake up? : Yes  2) Have you actually had any thoughts of killing yourself? : Yes  3) Have you been thinking about how you might kill yourself? : No  5) Have you started to work out or worked out the details of how to kill yourself?  Do you intend to carry out this plan? : No  6) Have you ever done anything, started to do anything, or prepared to do anything to end your life?: No  Change in Result na  Change in Plan of care na       EDUCATION:   EDUCATION:   Learner Progress Toward Treatment Goals: Reviewed results and recommendations of this team, Reviewed group plan and strategies, Reviewed signs, symptoms and risk of self harm and violent behavior, Reviewed goals and plan of care    Method:small group, individual verbal education    Outcome:verbalized by patient, but needs reinforcement to obtain goals    PATIENT GOALS:  Short term: go to every group  Long term: go back to school    PLAN/TREATMENT RECOMMENDATIONS UPDATE: continue with group therapies, increased socialization, continue planning for after discharge goals, continue with medication compliance    SHORT-TERM GOALS UPDATE:   Time frame for Short-Term Goals: 5-7 days    LONG-TERM GOALS UPDATE:   Time frame for Long-Term Goals: 6 months  Members Present in Team Meeting: See Signature Sheet    Kerri Ponce

## 2019-04-17 NOTE — PLAN OF CARE
Patient is out on the unit in intervals, affect flat, selectively social, attends some groups, stated she slept ok and her appetite is good. Patient currently denies thoughts to harm self or others but does admit to high levels of anxiety along with some depression. Patient became extremely anxious in the morning when she saw the amount of medications she was prescribed and was heard speaking with someone on the phone crying immediately after taking medications. Patient is cooperative with care, displays thought blocking at times pausing prior to answering direct questions. Patient safety maintained.

## 2019-04-17 NOTE — PROGRESS NOTES
Closed Toe Fracture  Your toe is broken (fractured). This causes local pain, swelling, and sometimes bruising. This injury usually takes about 4 to 6 weeks to heal, but can sometimes take longer. Toe injuries are often treated by taping the injured toe to the next one (buddy taping). This protects the injured toe and holds it in position.     If the toenail has been severely injured, it may fall off in 1 to 2 weeks. It takes up to 12 months for a new toenail to grow back.  Home care  Follow these guidelines when caring for yourself at home:  · You may be given a cast shoe to wear to keep your toe from moving. If not, you can use a sandal or any shoe that doesnt put pressure on the injured toe until the swelling and pain go away. If using a sandal, be careful not to strike your foot against anything. Another injury could make the fracture worse. If you were given crutches, dont put full weight on the injured foot until you can do so without pain, or as directed by your healthcare provider.  · Keep your foot elevated to reduce pain and swelling. When sleeping, put a pillow under the injured leg. When sitting, support the injured leg so it is above your waist. This is very important during the first 2 days (48 hours).  · Put an ice pack on the injured area. Do this for 20 minutes every 1 to 2 hours the first day for pain relief. You can make an ice pack by wrapping a plastic bag of ice cubes in a thin towel. As the ice melts, be careful that any cloth or paper tape doesnt get wet. Continue using the ice pack 3 to 4 times a day for the next 2 days. Then use the ice pack as needed to ease pain and swelling.  · If buddy tape was used and it becomes wet or dirty, change it. You may replace it with paper, plastic, or cloth tape. Cloth tape and paper tapes must be kept dry.  · You may use acetaminophen or ibuprofen to control pain, unless another pain medicine was prescribed. If you have chronic liver or kidney disease,  Pharmacy Med Education Group Note    Date: 4/17/19  Start Time: 5637  End Time: 3340    Number Participants in Group:  8    Goal:  Patient will demonstrate an understanding of the medications intended purpose and possible adverse effects  Topic: [place] for Pharmacy Med Ed Group    Discipline Responsible:     OT  AT  [facility].  RT     X Other       Participation Level:     None  Minimal      X Active Listener    X Interactive    Monopolizing         Participation Quality:    X Appropriate  Inappropriate     X       Attentive        Intrusive          Sharing        Resistant          Supportive        Lethargic       Affective:     X Congruent  Incongruent  Blunted  Flat    Constricted  Anxious  Elated  Angry    Labile  Depressed  Other         Cognitive:    X Alert  Oriented PPTP     Concentration   X G  F  P   Attention Span   X G  F  P   Short-Term Memory   X G  F  P   Long-Term Memory  G  F  P   ProblemSolving/  Decision Making  G  F  P   Ability to Process  Information   X G  F  P      Contributing Factors             Delusional             Hallucinating             Flight of Ideas             Other:       Modes of Intervention:    X Education   X Support  Exploration    Clarifying  Problem Solving  Confrontation    Socialization  Limit Setting  Reality Testing    Activity  Movement  Media    Other:            Response to Learning:    X Able to verbalize current knowledge/experience    Able to verbalize/acknowledge new learning    Able to retain information    Capable of insight    Able to change behavior    Progressing to goal    Other:        Comments:     Afsaneh Belle,PharmD, 4/17/2019, 4:33 PM talk with your healthcare provider before using these medicines. Also talk with your provider if youve had a stomach ulcer or gastrointestinal bleeding.  · You may return to sports or physical education activities after 4 weeks when you can run without pain, or as directed by your healthcare provider.  Follow-up care  Follow up with your healthcare provider in 1 week, or as advised. This is to make sure the bone is healing the way it should.  X-rays may be taken. You will be told of any new findings that may affect your care.  When to seek medical advice  Call your healthcare provider right away if any of these occur:  · Pain or swelling gets worse  · The cast/splint cracks  · The cast and padding get wet and stays wet more than 24 hours  · Bad odor from the cast/splint or wound fluid stains the cast  · Tightness or pressure under the cast/splint gets worse  · Toe becomes cold, blue, numb, or tingly  · You cant move the toe  · Signs of infection: fever, redness, warmth, swelling, or drainage from the wound or cast  · Fever of 100.4ºF (38ºC) or higher, or as directed by your healthcare provider  Date Last Reviewed: 2/1/2017  © 3235-8753 [employer] [address] [place] [address], [place] [address]. All rights reserved. This information is not intended as a substitute for professional medical care. Always follow your healthcare professional's instructions.

## 2019-04-17 NOTE — PROGRESS NOTES
Department of Psychiatry  Attending Physician Psychiatric Assessment     CHIEF COMPLAINT: Suicidal ideations     History obtained from:  patient, electronic medical record    HISTORY OF PRESENT ILLNESS:    Ed Robertson is a 26 y.o. female who presented to the ED with suicidal ideations to cut herself with a knife. The patient has been more depressed in the past few weeks. She was discharged from psychiatric unit to a half months ago due to the recent depression fluoxetine is being cross tapered with Viibryd by her psychiatrist.  The patient has been crying more and sleeping more, having meltdowns and panic attacks especially in the morning. Having poor energy. She has other symptoms of depression. No clear history of manic episodes but she was diagnosed with Asperger's disease. PAST PSYCHIATRIC HISTORY:    The patient has history of multiple psychiatric hospitalizations in the past.  The patient has not attempted suicide in the past.  She follows up with [facility]. Past Medical History:        Diagnosis Date    Depression     Gastritis     PCOS (polycystic ovarian syndrome)     Psychiatric problem     Reflex sympathetic dystrophy 7/19/2005       Past Surgical History:        Procedure Laterality Date    OVARIAN CYST REMOVAL         Medications Prior to Admission:   Medications Prior to Admission: ALPRAZolam (XANAX) 1 MG tablet, Take 1 mg by mouth 2 times daily as needed for Sleep.  lithium 300 MG capsule, Take 300 mg by mouth 2 times daily (with meals) Take with Lithium Carbonate 150mg to equal 450mg twice daily. lithium 150 MG capsule, Take 150 mg by mouth 2 times daily (with meals) Take with Lithium Carbonate 300mg to equal 450mg twice daily.   dicyclomine (BENTYL) 20 MG tablet, Take 20 mg by mouth 3 times daily  vilazodone HCl (VILAZODONE HCL) 20 MG TABS, Take 20 mg by mouth daily  tazarotene (TAZORAC) 0.05 % cream, Apply topically daily to affected area  pantoprazole (PROTONIX) 40 MG tablet, Take 40 mg by mouth daily  traZODone (DESYREL) 50 MG tablet, Take 25 mg by mouth nightly  benztropine (COGENTIN) 2 MG tablet, Take 2 mg by mouth every morning  FLUoxetine (PROZAC) 10 MG tablet, Take 30 mg by mouth daily Patient appears to be tapering down from Fluoxetine 60mg daily to be converted to Escitalopram.  escitalopram (LEXAPRO) 10 MG tablet, Take 10 mg by mouth daily Patient appears to be tapering up on Escitalopram while tapering down from Fluoxetine 60mg daily. linaclotide (LINZESS) 290 MCG CAPS capsule, Take 290 mcg by mouth every morning (before breakfast)  [DISCONTINUED] tazarotene (TAZORAC) 0.05 % cream, Apply topically daily Apply topically nightly. [DISCONTINUED] lithium 300 MG tablet, Take 300 mg by mouth 2 times daily Take with 150mg table  Lactobacillus Rhamnosus, GG, (CULTURELLE PO), Take by mouth 2 times daily (with meals)   vitamin D (CHOLECALCIFEROL) 1000 UNIT TABS tablet, Take 2 tablets by mouth daily  Norethindrone-Eth Estradiol (ALYACEN 1/35 PO), Take by mouth daily    Allergies:  Phenergan [promethazine hcl]    Psychosocial History:  She was raised by her mother and stepfather. No history of abuse. She was in IEP in school. She had some college but could not continue. She had job training but has not been able to work. She was never  and has no kids. Family History:   Her mother has bipolar disorder and anxiety. Problem Relation Age of Onset    Coronary Art Dis Mother     Hypertension Mother     Bipolar Disorder Mother     Anxiety Disorder Mother     Cancer Maternal Grandmother         Colon         PHYSICAL EXAM:  Vitals:  /65   Pulse 69   Temp 98.7 °F (37.1 °C) (Oral)   Resp 14   Ht 5' 7\" (1.702 m)   Wt 210 lb (95.3 kg)   SpO2 95%   BMI 32.89 kg/m²     Cranial nerves 1-12 grossly intact.     See H&P for more physical exam.      MENTAL STATUS EXAM  Level of consciousness:  within normal limits  Appearance:  well-appearing  Behavior/Motor: psychomotor retardation  Attitude toward examiner:  cooperative  Speech:  Soft and slow. Mood:  Depressed and anxious. Affect:  mood congruent, Constricted in range. Thought processes:  linear  Thought content: suicidal ideations. No hallucinations or delusions. Cognition:  oriented to person, place, and time  Memory: intact  Insight & Judgement: limited or impaired. DSM 5 DIAGNOSIS:    · Major depressive disorder, recurrent severe with suicidal ideations. · Bipolar disorder by history. · Autism spectrum disorder. Psychosocial and contextual factors:   Patient Active Problem List   Diagnosis    Major depressive disorder, recurrent episode, severe with anxious distress ([address]    Major depression, recurrent ([address]    PCOS (polycystic ovarian syndrome)    Reflex sympathetic dystrophy    Myalgia and myositis, unspecified    Acne vulgaris    Severe recurrent major depression without psychotic features ([address]    Bipolar 1 disorder, depressed ([address]          TREATMENT:    · The patient is admitted with suicide precautions. · The patient gave informed consent to the following plan after discussing the risks, benefits, alternatives and side effects. .  · We will increase Viibryd to 40 mg daily and stop fluoxetine. · Check lithium level and continue lithium at the same dose for now. Risk Management:  close watch per standard protocol      Psychotherapy:  participation in milieu and group and individual sessions with Attending Physician,  and Physician Assistant/CNP    Reason for Admission to Psychiatric Unit:  Threat to self requiring 24 hour professional observation      Estimated length of stay:  5-10 days      GENERAL PATIENT/FAMILY EDUCATION  Patient will understand basic signs and symptoms, Patient will understand benefits/risks and potential side effects from proposed meds and Patient will understand their role in recovery. Family is  active in patient's care.    Patient assets that may be helpful during treatment include: Intent to participate and engage in treatment, sufficient fund of knowledge and intellect to understand and utilize treatments.            Physicians Signature:  Electronically signed by Eyal Bhandari MD, on 4/16/2019 at 10:15 PM

## 2019-04-17 NOTE — GROUP NOTE
Group Therapy Note    Date: April 17    Group Start Time: 0900  Group End Time: 0920  Group Topic: Community Meeting    ELIZABETH Ortega    Pt did not attend community meeting and goal setting skills group at 0900 despite staff invitation to attend.

## 2019-04-17 NOTE — GROUP NOTE
Group Therapy Note    Date: April 17    Group Start Time: 1330  Group End Time: 1228  Group Topic: Recovery    [facility]    JACQUE Owen LSW      Attendees:8       Patient's Goal:  Increase socialization and understanding of recovery process. Notes:  Pt is making progress AEB actively listening to group discussion about positive coping skills and prioritizing mental health needs. Status After Intervention:  Improved    Participation Level:  Active Listener     Participation Quality: Appropriate, Attentive, and Supportive      Speech:  normal      Thought Process/Content: Logical      Affective Functioning: Congruent      Mood: stable      Level of consciousness:  Alert, Oriented x4 and Attentive      Response to Learning:  Capable of insight, Able to change behavior and Progressing to goal      Endings: None Reported    Modes of Intervention: Education, Support, Socialization and Problem-solving      Discipline Responsible: /Counselor      Signature:  JACQUE Owen LSW

## 2019-04-18 PROCEDURE — 99232 SBSQ HOSP IP/OBS MODERATE 35: CPT | Performed by: PSYCHIATRY & NEUROLOGY

## 2019-04-18 PROCEDURE — 6370000000 HC RX 637 (ALT 250 FOR IP): Performed by: PSYCHIATRY & NEUROLOGY

## 2019-04-18 PROCEDURE — 6370000000 HC RX 637 (ALT 250 FOR IP): Performed by: REGISTERED NURSE

## 2019-04-18 PROCEDURE — 1240000000 HC EMOTIONAL WELLNESS R&B

## 2019-04-18 RX ADMIN — DICYCLOMINE HYDROCHLORIDE 20 MG: 20 TABLET ORAL at 08:20

## 2019-04-18 RX ADMIN — HYDROXYZINE HYDROCHLORIDE 25 MG: 25 TABLET, FILM COATED ORAL at 21:58

## 2019-04-18 RX ADMIN — LITHIUM CARBONATE 600 MG: 300 CAPSULE, GELATIN COATED ORAL at 08:20

## 2019-04-18 RX ADMIN — LINACLOTIDE 290 MCG: 145 CAPSULE, GELATIN COATED ORAL at 08:19

## 2019-04-18 RX ADMIN — VILAZODONE HYDROCHLORIDE 40 MG: 40 TABLET ORAL at 08:20

## 2019-04-18 RX ADMIN — DICYCLOMINE HYDROCHLORIDE 20 MG: 20 TABLET ORAL at 21:56

## 2019-04-18 RX ADMIN — PANTOPRAZOLE SODIUM 40 MG: 40 TABLET, DELAYED RELEASE ORAL at 08:20

## 2019-04-18 RX ADMIN — Medication 1 CAPSULE: at 17:31

## 2019-04-18 RX ADMIN — Medication 1 CAPSULE: at 08:19

## 2019-04-18 RX ADMIN — BENZTROPINE MESYLATE 2 MG: 2 TABLET ORAL at 08:20

## 2019-04-18 RX ADMIN — ALPRAZOLAM 0.5 MG: 0.5 TABLET ORAL at 08:20

## 2019-04-18 RX ADMIN — LITHIUM CARBONATE 600 MG: 300 CAPSULE, GELATIN COATED ORAL at 17:31

## 2019-04-18 RX ADMIN — VITAMIN D, TAB 1000IU (100/BT) 2000 UNITS: 25 TAB at 08:19

## 2019-04-18 NOTE — GROUP NOTE
Group Therapy Note    Date: April 18    Group Start Time: 1000  Group End Time: 4952  Group Topic: Psychotherapy    [facility] [address] [facility] [place]        Group Therapy Note    Attendees: 6         Patient's Goal:  Expression and validation of feeling    Notes:      Status After Intervention:  Unchanged    Participation Level:  Active Listener    Participation Quality: Appropriate, Attentive and Sharing      Speech:  normal      Thought Process/Content: Logical      Affective Functioning: Congruent      Mood: euthymic      Level of consciousness:  Alert and Oriented x4      Response to Learning: Able to verbalize current knowledge/experience, Able to verbalize/acknowledge new learning and Able to retain information      Endings: None Reported    Modes of Intervention: Education, Support and Socialization      Discipline Responsible: /Counselor      Signature:  ISA Bacon

## 2019-04-18 NOTE — GROUP NOTE
Group Therapy Note    Date: April 18    Group Start Time: 0900  Group End Time: 0930  Group Topic: [address] [facility]    Tamara Reddingdyce, [address]        Group Therapy Note    Attendees: 6/14         Patient's Goal:  To set daily goals     Notes:       Status After Intervention:  Improved    Participation Level:  Active Listener and Interactive    Participation Quality: Appropriate, Attentive and Sharing      Speech:  normal      Thought Process/Content: Logical      Affective Functioning: Congruent      Mood: euphoric      Level of consciousness:  Alert and Oriented x4      Response to Learning: Able to verbalize current knowledge/experience and Able to verbalize/acknowledge new learning      Endings: None Reported    Modes of Intervention: Education, Support, Socialization and Exploration      Discipline Responsible: Psychoeducational Specialist      Signature:  Jose L Malave

## 2019-04-18 NOTE — PROGRESS NOTES
Department of Psychiatry  Attending Physician Progress Note    Chief Complaint: Major depressive disorder, recurrent episode, severe with anxious distress ([address]     SUBJECTIVE:     The patient was feeling depressed but reported improvement in her anxiety. Sleep and appetite are better. She still feels hopeless at times with poor energy and motivation. anxious. She said she has poor energy and feels like sleeping all day long. There was no visual or auditory hallucinations. She continues to feel hopeless and have suicidal ideations. There was no major side effects but complained about muscle twitching at times  There is no safe alternative other than the hospital treatment at this time. OBJECTIVE    Physical  VITALS:    /74   Pulse 79   Temp 98.6 °F (37 °C) (Oral)   Resp 14   Ht 5' 7\" (1.702 m)   Wt 210 lb (95.3 kg)   SpO2 95%   BMI 32.89 kg/m²     Mental Status Examination:    Level of consciousness:  Within normal limits  Appearance: Street clothes, seated, with good grooming  Behavior/Motor: No abnormalities noted  Attitude toward examiner:  Cooperative, attentive, good eye contact  Speech:  spontaneous, normal rate, normal volume and well articulated  Mood:  Depressed   Affect:  Mood-congruent, constricted in range. Thought processes:  linear, goal-directed and coherent  Thought content:  denies homicidal ideation  Suicidal Ideation: suicidal ideation  Delusions:  no evidence of delusions  Perceptual Disturbance:  No visual or auditory hallucinations. Cognition:  Intact  Memory: grossly intact.   Insight & Judgement: partial       Medications  Current Facility-Administered Medications: ALPRAZolam (XANAX) tablet 0.5 mg, 0.5 mg, Oral, Daily PRN  lithium capsule 600 mg, 600 mg, Oral, BID   benztropine (COGENTIN) tablet 2 mg, 2 mg, Oral, QAM  dicyclomine (BENTYL) tablet 20 mg, 20 mg, Oral, TID  lactobacillus (CULTURELLE) capsule 1 capsule, 1 capsule, Oral, BID   linaclotide (LINZESS) capsule 290 mcg, 290 mcg, Oral, QAM AC  pantoprazole (PROTONIX) tablet 40 mg, 40 mg, Oral, Daily  traZODone (DESYREL) tablet 25 mg, 25 mg, Oral, Nightly  vitamin D (CHOLECALCIFEROL) tablet 2,000 Units, 2,000 Units, Oral, Daily  vilazodone HCl (VIIBRYD) TABS 40 mg, 40 mg, Oral, Daily  hydrOXYzine (ATARAX) tablet 25 mg, 25 mg, Oral, TID PRN  acetaminophen (TYLENOL) tablet 650 mg, 650 mg, Oral, Q4H PRN  traZODone (DESYREL) tablet 50 mg, 50 mg, Oral, Nightly PRN  benztropine mesylate (COGENTIN) injection 2 mg, 2 mg, Intramuscular, BID PRN  magnesium hydroxide (MILK OF MAGNESIA) 400 MG/5ML suspension 30 mL, 30 mL, Oral, Daily PRN  aluminum & magnesium hydroxide-simethicone (MAALOX) 200-200-20 MG/5ML suspension 30 mL, 30 mL, Oral, Q6H PRN    ASSESSMENT     Principal Problem:    Major depressive disorder, recurrent episode, severe with anxious distress (HCC)  Active Problems:    Severe recurrent major depression without psychotic features ([address]    Bipolar 1 disorder, depressed ([address]  Resolved Problems:    * No resolved hospital problems. *      PLAN    · Lithium level was low at 0.4 on admission so we increased lithium to 600 mg twice a day. · We increased the Viibryd to 40 mg on admission to replace fluoxetine. · Continue the rest of the medications with no change at this time. · Continue unit milieu and group psychotherapy.     Electronically Signed by Shirlee Jeans, MD , 4/18/2019 7:34 PM

## 2019-04-18 NOTE — GROUP NOTE
Patient did not attend physical activity group which included stretching and cardio walking from [id number] despite staff encouragement and prompts.     Signature:  Nitin Solorzano, [address]

## 2019-04-18 NOTE — GROUP NOTE
Group Therapy Note    Date: April 18    Group Start Time: 1600  Group End Time: 1630  Group Topic: Healthy Living/Wellness    SILVIO Cody RN    Patient did not participate in 1600 wellness group despite staff invitation to attend.

## 2019-04-18 NOTE — PROGRESS NOTES
Department of Psychiatry  Attending Physician Progress Note    Chief Complaint: Major depressive disorder, recurrent episode, severe with anxious distress ([address]     SUBJECTIVE:     The patient was feeling depressed and anxious. She said she has poor energy and feels like sleeping all day long. There was no visual or auditory hallucinations. She continues to feel hopeless and have suicidal ideations. There was no major side effects. There is no safe alternative other than the hospital treatment at this time. OBJECTIVE    Physical  VITALS:    /73   Pulse 92   Temp 98.2 °F (36.8 °C) (Oral)   Resp 14   Ht 5' 7\" (1.702 m)   Wt 210 lb (95.3 kg)   SpO2 95%   BMI 32.89 kg/m²     Mental Status Examination:    Level of consciousness:  Within normal limits  Appearance: Street clothes, seated, with good grooming  Behavior/Motor: No abnormalities noted  Attitude toward examiner:  Cooperative, attentive, good eye contact  Speech:  spontaneous, normal rate, normal volume and well articulated  Mood:  Depressed   Affect:  Mood-congruent, constricted in range. Thought processes:  linear, goal-directed and coherent  Thought content:  denies homicidal ideation  Suicidal Ideation: suicidal ideation  Delusions:  no evidence of delusions  Perceptual Disturbance:  No visual or auditory hallucinations. Cognition:  Intact  Memory: grossly intact.   Insight & Judgement: partial       Medications  Current Facility-Administered Medications: ALPRAZolam (XANAX) tablet 0.5 mg, 0.5 mg, Oral, Daily PRN  lithium capsule 600 mg, 600 mg, Oral, BID WC  benztropine (COGENTIN) tablet 2 mg, 2 mg, Oral, QAM  dicyclomine (BENTYL) tablet 20 mg, 20 mg, Oral, TID  lactobacillus (CULTURELLE) capsule 1 capsule, 1 capsule, Oral, BID WC  linaclotide (LINZESS) capsule 290 mcg, 290 mcg, Oral, QAM AC  pantoprazole (PROTONIX) tablet 40 mg, 40 mg, Oral, Daily  traZODone (DESYREL) tablet 25 mg, 25 mg, Oral, Nightly  vitamin D (CHOLECALCIFEROL)

## 2019-04-18 NOTE — PLAN OF CARE
Problem: Altered Mood, Depressive Behavior:  Goal: Absence of self-harm  Description  Absence of self-harm  4/18/2019 1105 by Sherrill Kathleen LPN  Outcome: Ongoing  Note:   Patient denies wanting to cause harm to self. Patient agree to seek help if such thoughts were to arise. Problem: Depressive Behavior With or Without Suicide Precautions:  Goal: Participates in care planning  Description  Participates in care planning  4/18/2019 1105 by Sherrill Kathleen LPN  Outcome: Ongoing  Note:   Patient admits to 10/10 for depression. She stated she just wants to feel better. She rates her anxiety 5/10 and received xanax this morning which she states has been helpful. Problem: Suicide risk  Goal: Provide patient with safe environment  Description  Provide patient with safe environment  4/18/2019 1105 by Sherrill Kathleen LPN  Outcome: Ongoing  Note:   Patient is in a safe environment and safety checks is done on patient every 15 minutes. Patient contracts for safety while here.

## 2019-04-18 NOTE — GROUP NOTE
Group Therapy Note    Date: April 18    Group Start Time: 1430  Group End Time: 1520  Group Topic: Psychoeducation    ELIZABETH Gonzalez    Pt did not attend leisure and health education skills group at [phone] despite staff invitation to attend.

## 2019-04-18 NOTE — PLAN OF CARE
Patient has been out in the milieu aloof of peers. Patient admits to feeling depressed. Patient was compliant with scheduled medications this evening. Patient denies any suicidal thoughts at this time. Patient agrees to come talk with staff if having any thoughts to harm herself this shift. 15 min rounds continued for patient safety.

## 2019-04-18 NOTE — GROUP NOTE
Group Therapy Note    Date: April 18    Group Start Time: 1100  Group End Time: 0986  Group Topic: Psychoeducation    CZ BHEVAN Lakhani, HORTENCIAS    Group Therapy Note    Attendees: 9        Patient's Goal:  Pt will demonstrate improved interpersonal interactions    Notes:  Pt attended group and participated. Status After Intervention:  Improved    Participation Level:  Active Listener and Interactive    Participation Quality: Appropriate, Attentive, Sharing and Supportive      Speech:  normal      Thought Process/Content: Logical  Linear      Affective Functioning: Constricted/Restricted      Mood: euthymic      Level of consciousness:  Alert, Oriented x4 and Attentive      Response to Learning: Able to verbalize current knowledge/experience, Able to retain information, Able to change behavior and Progressing to goal      Endings: None Reported    Modes of Intervention: Education, Support, Socialization, Exploration and Media      Discipline Responsible: Psychoeducational Specialist      Signature:  Jw Garcias

## 2019-04-19 PROCEDURE — 99232 SBSQ HOSP IP/OBS MODERATE 35: CPT | Performed by: PSYCHIATRY & NEUROLOGY

## 2019-04-19 PROCEDURE — 6370000000 HC RX 637 (ALT 250 FOR IP): Performed by: PSYCHIATRY & NEUROLOGY

## 2019-04-19 PROCEDURE — 6370000000 HC RX 637 (ALT 250 FOR IP): Performed by: REGISTERED NURSE

## 2019-04-19 PROCEDURE — 1240000000 HC EMOTIONAL WELLNESS R&B

## 2019-04-19 RX ADMIN — VITAMIN D, TAB 1000IU (100/BT) 2000 UNITS: 25 TAB at 08:32

## 2019-04-19 RX ADMIN — BENZTROPINE MESYLATE 2 MG: 2 TABLET ORAL at 08:32

## 2019-04-19 RX ADMIN — DICYCLOMINE HYDROCHLORIDE 20 MG: 20 TABLET ORAL at 16:53

## 2019-04-19 RX ADMIN — LITHIUM CARBONATE 600 MG: 300 CAPSULE, GELATIN COATED ORAL at 16:53

## 2019-04-19 RX ADMIN — LITHIUM CARBONATE 600 MG: 300 CAPSULE, GELATIN COATED ORAL at 08:32

## 2019-04-19 RX ADMIN — VILAZODONE HYDROCHLORIDE 40 MG: 40 TABLET ORAL at 08:32

## 2019-04-19 RX ADMIN — Medication 1 CAPSULE: at 16:53

## 2019-04-19 RX ADMIN — PANTOPRAZOLE SODIUM 40 MG: 40 TABLET, DELAYED RELEASE ORAL at 08:32

## 2019-04-19 RX ADMIN — DICYCLOMINE HYDROCHLORIDE 20 MG: 20 TABLET ORAL at 08:32

## 2019-04-19 RX ADMIN — Medication 1 CAPSULE: at 08:32

## 2019-04-19 RX ADMIN — DICYCLOMINE HYDROCHLORIDE 20 MG: 20 TABLET ORAL at 21:42

## 2019-04-19 RX ADMIN — LINACLOTIDE 290 MCG: 145 CAPSULE, GELATIN COATED ORAL at 08:32

## 2019-04-19 RX ADMIN — HYDROXYZINE HYDROCHLORIDE 25 MG: 25 TABLET, FILM COATED ORAL at 21:42

## 2019-04-19 RX ADMIN — BREXPIPRAZOLE 1 MG: 1 TABLET ORAL at 16:53

## 2019-04-19 RX ADMIN — ALPRAZOLAM 0.5 MG: 0.5 TABLET ORAL at 09:13

## 2019-04-19 NOTE — PROGRESS NOTES
Department of Psychiatry  Attending Physician Progress Note    Chief Complaint: Major depressive disorder, recurrent episode, severe with anxious distress ([address]     SUBJECTIVE:     The patient was feeling depressed but reported some improvement in her anxiety. Sleep and appetite are better thank before. She still feels hopeless at times with poor energy and motivation. shecontinues to report suicidal ideations on a daily basis. There was no visual or auditory hallucinations. There was no major side effects. There is no safe alternative other than the hospital treatment at this time. OBJECTIVE    Physical  VITALS:    /67   Pulse 104   Temp 97.4 °F (36.3 °C) (Oral)   Resp 14   Ht 5' 7\" (1.702 m)   Wt 210 lb (95.3 kg)   SpO2 95%   BMI 32.89 kg/m²     Mental Status Examination:    Level of consciousness:  Within normal limits  Appearance: Street clothes, seated, with good grooming  Behavior/Motor: No abnormalities noted  Attitude toward examiner:  Cooperative, attentive, good eye contact  Speech:  spontaneous, normal rate, normal volume and well articulated  Mood:  Depressed   Affect:  Mood-congruent, constricted in range. Thought processes:  linear, goal-directed and coherent  Thought content:  denies homicidal ideation  Suicidal Ideation: suicidal ideation  Delusions:  no evidence of delusions  Perceptual Disturbance:  No visual or auditory hallucinations. Cognition:  Intact  Memory: grossly intact.   Insight & Judgement: partial       Medications  Current Facility-Administered Medications: brexpiprazole (REXULTI) tablet 1 mg, 1 mg, Oral, Daily  ALPRAZolam (XANAX) tablet 0.5 mg, 0.5 mg, Oral, Daily PRN  lithium capsule 600 mg, 600 mg, Oral, BID WC  benztropine (COGENTIN) tablet 2 mg, 2 mg, Oral, QAM  dicyclomine (BENTYL) tablet 20 mg, 20 mg, Oral, TID  lactobacillus (CULTURELLE) capsule 1 capsule, 1 capsule, Oral, BID WC  linaclotide (LINZESS) capsule 290 mcg, 290 mcg, Oral, QAM

## 2019-04-19 NOTE — GROUP NOTE
Patient did attend skills group which focused on developing a structured schedule for the week and weekend from [phone] despite staff encouragement and prompts.       Signature:  Taylor Quan [place]

## 2019-04-19 NOTE — GROUP NOTE
Group Therapy Note    Date: April 19    Group Start Time: 1000  Group End Time: 0117  Group Topic: Psychotherapy    [facility] [address] [facility] [place]        Group Therapy Note    Attendees: 5         Patient's Goal:  Expression of feeling    Notes:      Status After Intervention:  Unchanged    Participation Level:  Active Listener    Participation Quality: Appropriate, Attentive and Sharing      Speech:  normal      Thought Process/Content: Logical      Affective Functioning: Congruent      Mood: euthymic      Level of consciousness:  Alert and Oriented x4      Response to Learning: Able to verbalize current knowledge/experience, Able to verbalize/acknowledge new learning and Able to retain information      Endings: None Reported    Modes of Intervention: Education, Support and Socialization      Discipline Responsible: /Counselor      Signature:  ISA Sinha

## 2019-04-19 NOTE — PLAN OF CARE
Patient is tearful during 1:1 with staff. Patient has been medication compliant. Patient attends groups. Patient continues to have increased anxiety and depression. Patient denies SI at this time and agrees to approach staff if having any thoughts to harm self. Q 15 min safety checks continue at this time.

## 2019-04-19 NOTE — GROUP NOTE
Group Therapy Note    Date: April 19    Group Start Time: 1100  Group End Time: 1140  Group Topic: Psychoeducation    [facility]    Group Therapy Note    Attendees: 6         Patient's Goal:  Pt will demonstrate improved interpersonal skills    Notes:  Pt attended group and participated      Status After Intervention:  Improved    Participation Level: Interactive    Participation Quality: Appropriate, Sharing and Lethargic      Speech:  hesitant      Thought Process/Content: Logical      Affective Functioning: Constricted/Restricted      Mood: anxious      Level of consciousness:  Alert, Oriented x4 and Attentive      Response to Learning: Able to verbalize current knowledge/experience, Able to verbalize/acknowledge new learning and Able to change behavior      Endings: None Reported    Modes of Intervention: Education, Support, Socialization, Exploration and Problem-solving      Discipline Responsible: Psychoeducational Specialist      Signature:  Jw Romeo

## 2019-04-20 PROCEDURE — 6370000000 HC RX 637 (ALT 250 FOR IP): Performed by: PSYCHIATRY & NEUROLOGY

## 2019-04-20 PROCEDURE — 6370000000 HC RX 637 (ALT 250 FOR IP): Performed by: NURSE PRACTITIONER

## 2019-04-20 PROCEDURE — 1240000000 HC EMOTIONAL WELLNESS R&B

## 2019-04-20 PROCEDURE — 99254 IP/OBS CNSLTJ NEW/EST MOD 60: CPT | Performed by: INTERNAL MEDICINE

## 2019-04-20 PROCEDURE — 6370000000 HC RX 637 (ALT 250 FOR IP): Performed by: INTERNAL MEDICINE

## 2019-04-20 PROCEDURE — 6370000000 HC RX 637 (ALT 250 FOR IP): Performed by: REGISTERED NURSE

## 2019-04-20 PROCEDURE — 90833 PSYTX W PT W E/M 30 MIN: CPT | Performed by: NURSE PRACTITIONER

## 2019-04-20 PROCEDURE — 99232 SBSQ HOSP IP/OBS MODERATE 35: CPT | Performed by: NURSE PRACTITIONER

## 2019-04-20 RX ORDER — ATORVASTATIN CALCIUM 20 MG/1
40 TABLET, FILM COATED ORAL NIGHTLY
Status: DISCONTINUED | OUTPATIENT
Start: 2019-04-20 | End: 2019-04-24 | Stop reason: HOSPADM

## 2019-04-20 RX ORDER — BENZTROPINE MESYLATE 1 MG/1
1 TABLET ORAL NIGHTLY
Status: DISCONTINUED | OUTPATIENT
Start: 2019-04-20 | End: 2019-04-24 | Stop reason: HOSPADM

## 2019-04-20 RX ADMIN — BENZTROPINE MESYLATE 1 MG: 1 TABLET ORAL at 20:39

## 2019-04-20 RX ADMIN — Medication 1 CAPSULE: at 17:11

## 2019-04-20 RX ADMIN — HYDROXYZINE HYDROCHLORIDE 25 MG: 25 TABLET, FILM COATED ORAL at 17:11

## 2019-04-20 RX ADMIN — LINACLOTIDE 290 MCG: 145 CAPSULE, GELATIN COATED ORAL at 08:43

## 2019-04-20 RX ADMIN — LITHIUM CARBONATE 600 MG: 300 CAPSULE, GELATIN COATED ORAL at 17:11

## 2019-04-20 RX ADMIN — DICYCLOMINE HYDROCHLORIDE 20 MG: 20 TABLET ORAL at 08:43

## 2019-04-20 RX ADMIN — VITAMIN D, TAB 1000IU (100/BT) 2000 UNITS: 25 TAB at 08:43

## 2019-04-20 RX ADMIN — Medication 1 CAPSULE: at 08:43

## 2019-04-20 RX ADMIN — BENZTROPINE MESYLATE 2 MG: 2 TABLET ORAL at 08:43

## 2019-04-20 RX ADMIN — PANTOPRAZOLE SODIUM 40 MG: 40 TABLET, DELAYED RELEASE ORAL at 08:43

## 2019-04-20 RX ADMIN — LITHIUM CARBONATE 600 MG: 300 CAPSULE, GELATIN COATED ORAL at 08:43

## 2019-04-20 RX ADMIN — BREXPIPRAZOLE 1 MG: 1 TABLET ORAL at 08:43

## 2019-04-20 RX ADMIN — ATORVASTATIN CALCIUM 40 MG: 20 TABLET, FILM COATED ORAL at 20:39

## 2019-04-20 RX ADMIN — DICYCLOMINE HYDROCHLORIDE 20 MG: 20 TABLET ORAL at 20:39

## 2019-04-20 RX ADMIN — VILAZODONE HYDROCHLORIDE 40 MG: 40 TABLET ORAL at 08:43

## 2019-04-20 NOTE — GROUP NOTE
Group Therapy Note    Date: April 20    Group Start Time: 1000  Group End Time: 0043  Group Topic: Psychotherapy    SILVIO Dos Santos LPC        Group Therapy Note    Attendees: 8         Patient's Goal:  Problem solving skills and improve relationships    Notes:  Participated in group     Status After Intervention:  Improved    Participation Level: Interactive    Participation Quality: Sharing      Speech:  normal      Thought Process/Content: Logical      Affective Functioning: constricted      Mood: anxious      Level of consciousness:  Attentive      Response to Learning: Able to verbalize current knowledge/experience      Endings: None Reported    Modes of Intervention: Problem-solving      Discipline Responsible: /Counselor      Signature:  Alan Peralta LPC

## 2019-04-20 NOTE — GROUP NOTE
Group Therapy Note    Date: April 20    Group Start Time: 1600  Group End Time: 5798  Group Topic: Healthy Living/Wellness    [facility]    [facility]    Patient's Goal:  Increased coping skills    Notes:  Pt participated in group appropriately    Status After Intervention:  Improved    Participation Level:  Active Listener and Interactive    Participation Quality: Appropriate, Attentive, Sharing and Supportive    Speech:  normal    Thought Process/Content: Logical  Linear    Affective Functioning: Congruent    Mood: anxious and depressed    Level of consciousness:  Alert, Oriented x4 and Attentive    Response to Learning: Able to verbalize current knowledge/experience, Able to verbalize/acknowledge new learning, Able to retain information, Capable of insight and Progressing to goal    Endings: None Reported    Modes of Intervention: Education, Support, Socialization and Exploration    Discipline Responsible: [employer]    Signature:  [facility]

## 2019-04-20 NOTE — GROUP NOTE
Group Therapy Note    Date: April 20    Group Start Time: 1100  Group End Time: 1130  Group Topic: Healthy Living/Wellness    SILVIO Dobbs        Group Therapy Note    Attendees: 11/19         Patient's Goal:  Increased ADLs    Status After Intervention:  Improved    Participation Level: Interactive    Participation Quality: Appropriate and Attentive      Speech:  normal      Thought Process/Content: Logical  Linear      Affective Functioning: Congruent      Mood: WDL      Level of consciousness:  Alert, Oriented x4 and Attentive      Response to Learning: Able to verbalize current knowledge/experience, Capable of insight and Progressing to goal      Endings: None Reported    Modes of Intervention: Education and Activity      Discipline Responsible: Licensed Practical Nurse      Signature:  Karla Dobbs

## 2019-04-20 NOTE — CONSULTS
[address] [facility]    Consult Note. Date:   4/20/2019  Patient name:  Jarett Hayden  Date of admission:  4/15/2019  9:28 AM  MRN:   [id number]  YOB: 1995    Pt seen at the request of Mook Ramsey MD    CHIEF COMPLAINT:     History Obtained From:  Patient and chart review. HISTORY OF PRESENT ILLNESS:     elevated cholestrol   The patient is a 26 y.o.  female who is admitted to the hospital for medical management      Past Medical History:   has a past medical history of Depression, Gastritis, PCOS (polycystic ovarian syndrome), Psychiatric problem, and Reflex sympathetic dystrophy. Past Surgical History:   has a past surgical history that includes ovarian cyst removal.     Home Medications:    Prior to Admission medications    Medication Sig Start Date End Date Taking? Authorizing Provider   ALPRAZolam Lovbrendan Salmon) 1 MG tablet Take 1 mg by mouth 2 times daily as needed for Sleep. Yes Historical Provider, MD   lithium 300 MG capsule Take 300 mg by mouth 2 times daily (with meals) Take with Lithium Carbonate 150mg to equal 450mg twice daily. Yes Historical Provider, MD   lithium 150 MG capsule Take 150 mg by mouth 2 times daily (with meals) Take with Lithium Carbonate 300mg to equal 450mg twice daily.    Yes Historical Provider, MD   dicyclomine (BENTYL) 20 MG tablet Take 20 mg by mouth 3 times daily   Yes Historical Provider, MD   vilazodone HCl (VILAZODONE HCL) 20 MG TABS Take 20 mg by mouth daily   Yes Historical Provider, MD   tazarotene (TAZORAC) 0.05 % cream Apply topically daily to affected area   Yes Historical Provider, MD   pantoprazole (PROTONIX) 40 MG tablet Take 40 mg by mouth daily   Yes Historical Provider, MD   traZODone (DESYREL) 50 MG tablet Take 25 mg by mouth nightly   Yes Historical Provider, MD   benztropine (COGENTIN) 2 MG tablet Take 2 mg by mouth every morning   Yes Historical Provider, MD hematuria. · Musculoskeletal: Negative for gait disturbance, weakness, joint complaints. · Integumentary: Negative for rash, pruritis. · Neurological: Negative for headache, dizziness, change in muscle strength, numbness/tingling, change in gait, balance, coordination,   · Endocrine: negative for temperature intolerance, excessive thirst, fluid intake, or urination, tremor. · Hematologic/Lymphatic: negative for abnormal bruising or bleeding, blood clots, swollen lymph nodes. · Allergic/Immunologic: negative for nasal congestion, pruritis, hives. PHYSICAL EXAM:    /67   Pulse 76   Temp 97.8 °F (36.6 °C) (Oral)   Resp 12   Ht 5' 7\" (1.702 m)   Wt 210 lb (95.3 kg)   SpO2 95%   BMI 32.89 kg/m²      · General appearance: well nourished  · HEENT: Head: Normocephalic, no lesions, without obvious abnormality. · Lungs: clear to auscultation bilaterally  · Heart: regular rate and rhythm, S1, S2 normal, no murmur, click, rub or gallop  · Abdomen: soft, non-tender; bowel sounds normal; no masses,  no organomegaly  · Extremities: extremities normal, atraumatic, no cyanosis or edema  · Neurological: Gait normal. Reflexes normal and symmetric. Sensation grossly normal  · Skin - no rash, no lump   · Eye no icterus no redness  · Lymphatic system-no lymphadenopathy no splenomegaly       DIAGNOSTICS:    Laboratory Testing:  CBC: No results for input(s): WBC, HGB, PLT in the last 72 hours. BMP:  No results for input(s): NA, K, CL, CO2, BUN, CREATININE, GLUCOSE in the last 72 hours. S. Calcium:No results for input(s): CALCIUM in the last 72 hours. S. Ionized Calcium:No results for input(s): IONCA in the last 72 hours. S. Magnesium:No results for input(s): MG in the last 72 hours. S. Phosphorus:No results for input(s): PHOS in the last 72 hours. S. Glucose:No results for input(s): POCGLU in the last 72 hours. Glycosylated hemoglobin A1C: No results for input(s): LABA1C in the last 72 hours.   INR: No results for input(s): INR in the last 72 hours. Hepatic functions: No results for input(s): ALKPHOS, ALT, AST, PROT, BILITOT, BILIDIR, LABALBU in the last 72 hours. Pancreatic functions:No results for input(s): LACTA, AMYLASE in the last 72 hours. S. Lactic Acid: No results for input(s): LACTA in the last 72 hours. Cardiac enzymes:No results for input(s): CKTOTAL, CKMB, CKMBINDEX, TROPONINI in the last 72 hours. BNP:No results for input(s): BNP in the last 72 hours. Lipid profile: No results for input(s): CHOL, TRIG, HDL, LDLCALC in the last 72 hours. Invalid input(s): LDL  Blood Gases: No results found for: PH, PCO2, PO2, HCO3, O2SAT  Thyroid functions:   Lab Results   Component Value Date    TSH 3.19 04/16/2019        Imaging/Diagonstics:    No results found. ASSESSMENT:    Patient Active Problem List   Diagnosis    Major depressive disorder, recurrent episode, severe with anxious distress ([address]    Major depression, recurrent ([address]    PCOS (polycystic ovarian syndrome)    Reflex sympathetic dystrophy    Myalgia and myositis, unspecified    Acne vulgaris    Severe recurrent major depression without psychotic features ([address]    Bipolar 1 disorder, depressed ([address]    dyslipidemia   no meds   tg > 300   poor diet compliance    PLAN:  Add statin    low chol diet      MD TERRA Nuñez[address]    Phone [phone]   Fax: [phone]  Answering Service: [phone]

## 2019-04-20 NOTE — GROUP NOTE
Group Therapy Note    Date: April 20    Group Start Time: 1330  Group End Time: 0390  Group Topic: Recreational    [facility] GERRY Calderon, HORTENCIAS    Pt did not participate in Leisure Skills Group despite staff encouragement.         Signature:  Romero Sanchez

## 2019-04-20 NOTE — PROGRESS NOTES
Department of Psychiatry  Nurse Practitioner Progress Note    Chief Complaint: Major depressive disorder, recurrent episode, severe with anxious distress ([address]     SUBJECTIVE: Today Janet Peterson is seen in the day room. She is dressed in street clothes, is flat and poorly reactive stating that she is better since her last admission. She remains depressed stating that she has fleeting thoughts to harm herself, that they never go away and this distresses her. She utilizes her coping skills in order to decrease her thoughts however; she states that she is not ready to go home yet because it is [place] out there\". She is requesting that her medication be adjusted because she continues to have side effects including shaking. She states that while she does experience anxiety that has also decreased since her last admission, she does not feel that her shaking is related to her anxiety. She denies HI and hallucinations. She is attending groups and is medication compliant. A repeat lithium level will be drawn on 4/22/19. Chart and medications reviewed. Therapeutic support, empathetic care and psycho education provided greater than 20 minutes. At this time there is no safe alternative other than inpatient care. OBJECTIVE    Physical  VITALS:    /67   Pulse 76   Temp 97.8 °F (36.6 °C) (Oral)   Resp 12   Ht 5' 7\" (1.702 m)   Wt 210 lb (95.3 kg)   SpO2 95%   BMI 32.89 kg/m²     Mental Status Examination:    Level of consciousness:  Within normal limits  Appearance: Street clothes, seated, with good grooming  Behavior/Motor: No abnormalities noted  Attitude toward examiner:  Cooperative, attentive, good eye contact  Speech:  spontaneous, normal rate, normal volume and well articulated  Mood:  Depressed   Affect:  Mood-congruent, constricted in range.   Thought processes:  linear, goal-directed and coherent  Thought content:  denies homicidal ideation  Suicidal Ideation: denies  Delusions:  no evidence of delusions  Perceptual Disturbance:  No visual or auditory hallucinations. Cognition:  Intact  Memory: grossly intact. Insight & Judgement: partial       Medications  Current Facility-Administered Medications: benztropine (COGENTIN) tablet 1 mg, 1 mg, Oral, Nightly  brexpiprazole (REXULTI) tablet 1 mg, 1 mg, Oral, Daily  ALPRAZolam (XANAX) tablet 0.5 mg, 0.5 mg, Oral, Daily PRN  lithium capsule 600 mg, 600 mg, Oral, BID WC  benztropine (COGENTIN) tablet 2 mg, 2 mg, Oral, QAM  dicyclomine (BENTYL) tablet 20 mg, 20 mg, Oral, TID  lactobacillus (CULTURELLE) capsule 1 capsule, 1 capsule, Oral, BID WC  linaclotide (LINZESS) capsule 290 mcg, 290 mcg, Oral, QAM AC  pantoprazole (PROTONIX) tablet 40 mg, 40 mg, Oral, Daily  traZODone (DESYREL) tablet 25 mg, 25 mg, Oral, Nightly  vitamin D (CHOLECALCIFEROL) tablet 2,000 Units, 2,000 Units, Oral, Daily  vilazodone HCl (VIIBRYD) TABS 40 mg, 40 mg, Oral, Daily  hydrOXYzine (ATARAX) tablet 25 mg, 25 mg, Oral, TID PRN  acetaminophen (TYLENOL) tablet 650 mg, 650 mg, Oral, Q4H PRN  traZODone (DESYREL) tablet 50 mg, 50 mg, Oral, Nightly PRN  benztropine mesylate (COGENTIN) injection 2 mg, 2 mg, Intramuscular, BID PRN  magnesium hydroxide (MILK OF MAGNESIA) 400 MG/5ML suspension 30 mL, 30 mL, Oral, Daily PRN  aluminum & magnesium hydroxide-simethicone (MAALOX) 200-200-20 MG/5ML suspension 30 mL, 30 mL, Oral, Q6H PRN    ASSESSMENT     Principal Problem:    Major depressive disorder, recurrent episode, severe with anxious distress (HCC)  Active Problems:    Severe recurrent major depression without psychotic features (HCC)    Bipolar 1 disorder, depressed (HCC)  Resolved Problems:    * No resolved hospital problems. *      PLAN    · The patient said she was on Latuda at home but it was not listed on her home medication list.  We will replace by Rexulti 1 mg daily. She gave informed consent.   · Lithium level was low at 0.4 on admission so we increased lithium to 600 mg twice a day.  · Repeat Lithium level to be drawn on 4/22/19. · New Order - Cogentin 1mg PO at HS beginning 4/20/19. · We increased Viibryd to 40 mg on admission to replace fluoxetine. · Continue the rest of the medications with no change at this time. · Continue unit milieu and group psychotherapy. · Therapeutic support, empathetic care and psycho education provided greater than 20 minutes.     Electronically Signed by THELMA Licea CNP , 4/20/2019 1:07 PM

## 2019-04-21 PROCEDURE — 6370000000 HC RX 637 (ALT 250 FOR IP): Performed by: NURSE PRACTITIONER

## 2019-04-21 PROCEDURE — 6370000000 HC RX 637 (ALT 250 FOR IP): Performed by: REGISTERED NURSE

## 2019-04-21 PROCEDURE — 1240000000 HC EMOTIONAL WELLNESS R&B

## 2019-04-21 PROCEDURE — 6370000000 HC RX 637 (ALT 250 FOR IP): Performed by: INTERNAL MEDICINE

## 2019-04-21 PROCEDURE — 99231 SBSQ HOSP IP/OBS SF/LOW 25: CPT | Performed by: INTERNAL MEDICINE

## 2019-04-21 PROCEDURE — 6370000000 HC RX 637 (ALT 250 FOR IP): Performed by: PSYCHIATRY & NEUROLOGY

## 2019-04-21 RX ADMIN — ALPRAZOLAM 0.5 MG: 0.5 TABLET ORAL at 21:38

## 2019-04-21 RX ADMIN — Medication 1 CAPSULE: at 18:33

## 2019-04-21 RX ADMIN — ATORVASTATIN CALCIUM 40 MG: 20 TABLET, FILM COATED ORAL at 21:38

## 2019-04-21 RX ADMIN — PANTOPRAZOLE SODIUM 40 MG: 40 TABLET, DELAYED RELEASE ORAL at 08:18

## 2019-04-21 RX ADMIN — ACETAMINOPHEN 650 MG: 325 TABLET, FILM COATED ORAL at 21:38

## 2019-04-21 RX ADMIN — BENZTROPINE MESYLATE 2 MG: 2 TABLET ORAL at 08:17

## 2019-04-21 RX ADMIN — LITHIUM CARBONATE 600 MG: 300 CAPSULE, GELATIN COATED ORAL at 08:17

## 2019-04-21 RX ADMIN — HYDROXYZINE HYDROCHLORIDE 25 MG: 25 TABLET, FILM COATED ORAL at 13:07

## 2019-04-21 RX ADMIN — VITAMIN D, TAB 1000IU (100/BT) 2000 UNITS: 25 TAB at 08:17

## 2019-04-21 RX ADMIN — BENZTROPINE MESYLATE 1 MG: 1 TABLET ORAL at 21:38

## 2019-04-21 RX ADMIN — VILAZODONE HYDROCHLORIDE 40 MG: 40 TABLET ORAL at 08:18

## 2019-04-21 RX ADMIN — BREXPIPRAZOLE 1 MG: 1 TABLET ORAL at 08:18

## 2019-04-21 RX ADMIN — DICYCLOMINE HYDROCHLORIDE 20 MG: 20 TABLET ORAL at 21:38

## 2019-04-21 RX ADMIN — Medication 1 CAPSULE: at 08:18

## 2019-04-21 RX ADMIN — LINACLOTIDE 290 MCG: 145 CAPSULE, GELATIN COATED ORAL at 08:41

## 2019-04-21 RX ADMIN — DICYCLOMINE HYDROCHLORIDE 20 MG: 20 TABLET ORAL at 13:07

## 2019-04-21 RX ADMIN — LITHIUM CARBONATE 600 MG: 300 CAPSULE, GELATIN COATED ORAL at 18:34

## 2019-04-21 RX ADMIN — DICYCLOMINE HYDROCHLORIDE 20 MG: 20 TABLET ORAL at 08:18

## 2019-04-21 ASSESSMENT — PAIN SCALES - GENERAL
PAINLEVEL_OUTOF10: 0
PAINLEVEL_OUTOF10: 1

## 2019-04-21 ASSESSMENT — PAIN DESCRIPTION - LOCATION: LOCATION: HEAD

## 2019-04-21 ASSESSMENT — PAIN DESCRIPTION - PAIN TYPE: TYPE: ACUTE PAIN

## 2019-04-21 NOTE — PROGRESS NOTES
[address]                Date:   4/21/2019  Patient name:  Doren Siemens  Date of admission:  4/15/2019  9:28 AM  MRN:   [id number]  YOB: 1995    Pt seen at the request of An Childers MD    CHIEF COMPLAINT:     History Obtained From:  Patient and chart review. HISTORY OF PRESENT ILLNESS:     elevated cholestrol   The patient is a 26 y.o.  female who is admitted to the hospital for medical management      Past Medical History:   has a past medical history of Depression, Gastritis, PCOS (polycystic ovarian syndrome), Psychiatric problem, and Reflex sympathetic dystrophy. Past Surgical History:   has a past surgical history that includes ovarian cyst removal.     Home Medications:    Prior to Admission medications    Medication Sig Start Date End Date Taking? Authorizing Provider   ALPRAZolam Colie Bonny) 1 MG tablet Take 1 mg by mouth 2 times daily as needed for Sleep. Yes Historical Provider, MD   lithium 300 MG capsule Take 300 mg by mouth 2 times daily (with meals) Take with Lithium Carbonate 150mg to equal 450mg twice daily. Yes Historical Provider, MD   lithium 150 MG capsule Take 150 mg by mouth 2 times daily (with meals) Take with Lithium Carbonate 300mg to equal 450mg twice daily.    Yes Historical Provider, MD   dicyclomine (BENTYL) 20 MG tablet Take 20 mg by mouth 3 times daily   Yes Historical Provider, MD   vilazodone HCl (VILAZODONE HCL) 20 MG TABS Take 20 mg by mouth daily   Yes Historical Provider, MD   tazarotene (TAZORAC) 0.05 % cream Apply topically daily to affected area   Yes Historical Provider, MD   pantoprazole (PROTONIX) 40 MG tablet Take 40 mg by mouth daily   Yes Historical Provider, MD   traZODone (DESYREL) 50 MG tablet Take 25 mg by mouth nightly   Yes Historical Provider, MD   benztropine (COGENTIN) 2 MG tablet Take 2 mg by mouth every morning   Yes Historical Provider, MD   FLUoxetine 72 hours. Hepatic functions: No results for input(s): ALKPHOS, ALT, AST, PROT, BILITOT, BILIDIR, LABALBU in the last 72 hours. Pancreatic functions:No results for input(s): LACTA, AMYLASE in the last 72 hours. S. Lactic Acid: No results for input(s): LACTA in the last 72 hours. Cardiac enzymes:No results for input(s): CKTOTAL, CKMB, CKMBINDEX, TROPONINI in the last 72 hours. BNP:No results for input(s): BNP in the last 72 hours. Lipid profile: No results for input(s): CHOL, TRIG, HDL, LDLCALC in the last 72 hours. Invalid input(s): LDL  Blood Gases: No results found for: PH, PCO2, PO2, HCO3, O2SAT  Thyroid functions:   Lab Results   Component Value Date    TSH 3.19 04/16/2019        Imaging/Diagonstics:    No results found. ASSESSMENT:    Patient Active Problem List   Diagnosis    Major depressive disorder, recurrent episode, severe with anxious distress ([address]    Major depression, recurrent ([address]    PCOS (polycystic ovarian syndrome)    Reflex sympathetic dystrophy    Myalgia and myositis, unspecified    Acne vulgaris    Severe recurrent major depression without psychotic features ([address]    Bipolar 1 disorder, depressed ([address]    Depression with suicidal ideation    dyslipidemia   no meds   tg > 300   poor diet compliance    PLAN:  Add statin    low chol diet        4/21   tolerating ststin   rechck lipid panel  3 m  Montana Roberson MD  [address], [address] [place] [address].    Phone [phone]   Fax: [phone]  Answering Service: [phone]

## 2019-04-21 NOTE — PLAN OF CARE
Please remain anxious with flat effect,pt encouraged to use her coping skills and attend groups. Patient denies all thoughts of self harm,encouraged to continue medication regime. Patient remain safe while on the unit. 100 % meal intake. 15 min safety checks.

## 2019-04-21 NOTE — GROUP NOTE
Group Therapy Note    Date: April 21    Group Start Time: 0900  Group End Time: 0915  Group Topic: Community Meeting    SILVIO DOHERTY    ELIZABETH Brush    Group Therapy Note    Attendees: 9         Patient's Goal:  Pt will demonstrate improved interpersonal skills    Notes:  Pt attended group and participated      Status After Intervention:  Improved    Participation Level:  Active Listener, Interactive and Monopolizing    Participation Quality: Appropriate, Attentive and Sharing      Speech:  normal      Thought Process/Content: Logical      Affective Functioning: Constricted/Restricted      Mood: euthymic      Level of consciousness:  Alert and Attentive      Response to Learning: Able to verbalize current knowledge/experience, Able to verbalize/acknowledge new learning and Able to retain information      Endings: None Reported    Modes of Intervention: Education, Support, Socialization, Exploration, Problem-solving and Limit-setting      Discipline Responsible: Psychoeducational Specialist      Signature:  ELIZABETH Brush

## 2019-04-21 NOTE — PROGRESS NOTES
Patient participated in the open REC group with coloring at the table in common area. 15 MIN safety checks.

## 2019-04-21 NOTE — GROUP NOTE
Group Therapy Note    Date: April 21    Group Start Time: 1000  Group End Time: 54  Group Topic: Psychoeducation    SILVIO COATS    JACQUE Ng LSW           Patient's Goal:  Communication      Notes:  Participated in group discussions; identified coping skills; addressed personal goals for decreased symptoms of anxiety     Status After Intervention:  Unchanged    Participation Level: Interactive    Participation Quality: Appropriate, Attentive and Sharing      Speech:  normal      Thought Process/Content: Logical      Affective Functioning: Congruent      Mood: euthymic      Level of consciousness:  Oriented x4      Response to Learning: Able to verbalize current knowledge/experience, Able to verbalize/acknowledge new learning and Capable of insight      Endings: None Reported    Modes of Intervention: Support, Socialization, Exploration and Problem-solving      Discipline Responsible: /Counselor      Signature:   JACQUE Ng LSW

## 2019-04-22 LAB
LITHIUM DATE LAST DOSE: NORMAL
LITHIUM DOSE AMOUNT: 600
LITHIUM DOSE TIME: 1834
LITHIUM LEVEL: 1.2 MMOL/L (ref 0.6–1.2)

## 2019-04-22 PROCEDURE — 6370000000 HC RX 637 (ALT 250 FOR IP): Performed by: NURSE PRACTITIONER

## 2019-04-22 PROCEDURE — 1240000000 HC EMOTIONAL WELLNESS R&B

## 2019-04-22 PROCEDURE — 80178 ASSAY OF LITHIUM: CPT

## 2019-04-22 PROCEDURE — 99232 SBSQ HOSP IP/OBS MODERATE 35: CPT | Performed by: PSYCHIATRY & NEUROLOGY

## 2019-04-22 PROCEDURE — 6370000000 HC RX 637 (ALT 250 FOR IP): Performed by: INTERNAL MEDICINE

## 2019-04-22 PROCEDURE — 6370000000 HC RX 637 (ALT 250 FOR IP): Performed by: PSYCHIATRY & NEUROLOGY

## 2019-04-22 PROCEDURE — 6370000000 HC RX 637 (ALT 250 FOR IP): Performed by: REGISTERED NURSE

## 2019-04-22 PROCEDURE — 36415 COLL VENOUS BLD VENIPUNCTURE: CPT

## 2019-04-22 RX ORDER — BUPROPION HYDROCHLORIDE 100 MG/1
100 TABLET ORAL DAILY
Status: DISCONTINUED | OUTPATIENT
Start: 2019-04-22 | End: 2019-04-24 | Stop reason: HOSPADM

## 2019-04-22 RX ADMIN — DICYCLOMINE HYDROCHLORIDE 20 MG: 20 TABLET ORAL at 14:18

## 2019-04-22 RX ADMIN — DICYCLOMINE HYDROCHLORIDE 20 MG: 20 TABLET ORAL at 20:57

## 2019-04-22 RX ADMIN — ATORVASTATIN CALCIUM 40 MG: 20 TABLET, FILM COATED ORAL at 20:58

## 2019-04-22 RX ADMIN — VILAZODONE HYDROCHLORIDE 40 MG: 40 TABLET ORAL at 08:04

## 2019-04-22 RX ADMIN — LITHIUM CARBONATE 600 MG: 300 CAPSULE, GELATIN COATED ORAL at 08:12

## 2019-04-22 RX ADMIN — BREXPIPRAZOLE 1 MG: 1 TABLET ORAL at 08:04

## 2019-04-22 RX ADMIN — LINACLOTIDE 290 MCG: 145 CAPSULE, GELATIN COATED ORAL at 08:59

## 2019-04-22 RX ADMIN — Medication 1 CAPSULE: at 08:04

## 2019-04-22 RX ADMIN — PANTOPRAZOLE SODIUM 40 MG: 40 TABLET, DELAYED RELEASE ORAL at 08:04

## 2019-04-22 RX ADMIN — VITAMIN D, TAB 1000IU (100/BT) 2000 UNITS: 25 TAB at 08:05

## 2019-04-22 RX ADMIN — DICYCLOMINE HYDROCHLORIDE 20 MG: 20 TABLET ORAL at 08:04

## 2019-04-22 RX ADMIN — BUPROPION HYDROCHLORIDE 100 MG: 100 TABLET, FILM COATED ORAL at 10:54

## 2019-04-22 RX ADMIN — BENZTROPINE MESYLATE 1 MG: 1 TABLET ORAL at 20:57

## 2019-04-22 RX ADMIN — Medication 1 CAPSULE: at 18:13

## 2019-04-22 RX ADMIN — BENZTROPINE MESYLATE 2 MG: 2 TABLET ORAL at 08:05

## 2019-04-22 RX ADMIN — HYDROXYZINE HYDROCHLORIDE 25 MG: 25 TABLET, FILM COATED ORAL at 09:02

## 2019-04-22 RX ADMIN — TRAZODONE HYDROCHLORIDE 25 MG: 50 TABLET ORAL at 20:58

## 2019-04-22 NOTE — CARE COORDINATION
RAY faxed information to Gil[facility] Rankin with the goal of scheduling patient's outpatient appointments. RAY awaits call back for scheduling patient.

## 2019-04-22 NOTE — GROUP NOTE
Group Therapy Note    Date: April 22    Group Start Time: 1000  Group End Time: 2539  Group Topic: Psychotherapy    [facility] [address] [place] [facility] [place]        Group Therapy Note    Attendees: 6         Patient's Goal:  Expression of feeling     Notes:      Status After Intervention:  Unchanged    Participation Level:  Active Listener    Participation Quality: Appropriate      Speech:  normal      Thought Process/Content: Logical      Affective Functioning: Congruent      Mood: euthymic      Level of consciousness:  Alert and Oriented x4      Response to Learning: Able to verbalize current knowledge/experience and Able to verbalize/acknowledge new learning      Endings: None Reported    Modes of Intervention: Education and Support      Discipline Responsible: /Counselor      Signature:  ISA Mejias

## 2019-04-22 NOTE — PLAN OF CARE
Problem: Altered Mood, Depressive Behavior:  Goal: Absence of self-harm  Description  Absence of self-harm  4/21/2019 2009 by Domonique Higuera LPN  Outcome: Ongoing  Note:   Patient denies thoughts of self harm at this time. Patient agrees to seek out staff if they begin having thoughts of harming self or they need to talk.  Q15min safety checks continue      Problem: Depressive Behavior With or Without Suicide Precautions:  Goal: Participates in care planning  Description  Participates in care planning  4/21/2019 2009 by Domonique Higuera LPN  Outcome: Ongoing

## 2019-04-22 NOTE — GROUP NOTE
Group Therapy Note    Date: April 22    Group Start Time: 1330  Group End Time: 1415  Group Topic: Psychoeducation    SILVIO Man, CTRS             Patient's Goal:  To increase awareness / knowledge of facts about mental health in order to be able to better advocate for self. Notes:  Patient attended group and actively participated in task at hand with much encouragement from Heydi Tomlinson and peers. Patient wanted to leave group multiple times due to \"feeling anxious and being cold\", but with positive words of encouragement from others, she stayed through entirety of group and had improved mood. Status After Intervention:  Improved    Participation Level:  Active Listener and Interactive    Participation Quality: Appropriate, Attentive, Sharing and Supportive      Speech:  hesitant      Thought Process/Content: Logical      Affective Functioning: Constricted/Restricted      Level of consciousness:  Alert and Attentive      Response to Learning: Able to verbalize current knowledge/experience, Able to verbalize/acknowledge new learning, Able to retain information, Capable of insight and Progressing to goal      Endings: None Reported    Modes of Intervention: Education, Support, Socialization, Exploration, Clarifying, Problem-solving, Activity, Confrontation, Limit-setting and Reality-testing      Discipline Responsible: Psychoeducational Specialist      Signature:  Lv Bean

## 2019-04-22 NOTE — BH NOTE
norethindrone-ethinyl estradiol  1 tablet Oral Daily    benztropine  1 mg Oral Nightly    atorvastatin  40 mg Oral Nightly    lithium  600 mg Oral BID WC    benztropine  2 mg Oral QAM    dicyclomine  20 mg Oral TID    lactobacillus  1 capsule Oral BID WC    linaclotide  290 mcg Oral QAM AC    pantoprazole  40 mg Oral Daily    traZODone  25 mg Oral Nightly    vitamin D  2,000 Units Oral Daily    vilazodone HCl  40 mg Oral Daily       ASSESSMENT  Major depressive disorder, recurrent episode, severe with anxious distress ([facility] [address]     Patient's Response to Treatment: negative    PLAN  Dragon voice recognition software used in portions of this document. Plan to start her on Wellbutrin plain 100 MG daily and increase Rexulti to 2 MG p.o. daily. We will continue to monitor her progress.

## 2019-04-22 NOTE — PLAN OF CARE
Patient denies suicidal ideations, depression, and states her anxiety is better. She is medication compliant, attends groups, and is social. Patient reports eating, drinking, and sleeping adequately. Safety checks every 15 min; patient safety maintained.

## 2019-04-23 PROBLEM — F31.9 BIPOLAR 1 DISORDER, DEPRESSED (HCC): Chronic | Status: ACTIVE | Noted: 2019-04-16

## 2019-04-23 LAB
LITHIUM DATE LAST DOSE: ABNORMAL
LITHIUM DOSE AMOUNT: 600
LITHIUM DOSE TIME: 812
LITHIUM LEVEL: 0.4 MMOL/L (ref 0.6–1.2)

## 2019-04-23 PROCEDURE — 6370000000 HC RX 637 (ALT 250 FOR IP): Performed by: NURSE PRACTITIONER

## 2019-04-23 PROCEDURE — 6370000000 HC RX 637 (ALT 250 FOR IP): Performed by: PSYCHIATRY & NEUROLOGY

## 2019-04-23 PROCEDURE — 1240000000 HC EMOTIONAL WELLNESS R&B

## 2019-04-23 PROCEDURE — 99232 SBSQ HOSP IP/OBS MODERATE 35: CPT | Performed by: PSYCHIATRY & NEUROLOGY

## 2019-04-23 PROCEDURE — 36415 COLL VENOUS BLD VENIPUNCTURE: CPT

## 2019-04-23 PROCEDURE — 80178 ASSAY OF LITHIUM: CPT

## 2019-04-23 PROCEDURE — 6370000000 HC RX 637 (ALT 250 FOR IP): Performed by: INTERNAL MEDICINE

## 2019-04-23 RX ADMIN — DICYCLOMINE HYDROCHLORIDE 20 MG: 20 TABLET ORAL at 20:35

## 2019-04-23 RX ADMIN — Medication 1 CAPSULE: at 18:06

## 2019-04-23 RX ADMIN — BUPROPION HYDROCHLORIDE 100 MG: 100 TABLET, FILM COATED ORAL at 08:12

## 2019-04-23 RX ADMIN — VILAZODONE HYDROCHLORIDE 40 MG: 40 TABLET ORAL at 08:12

## 2019-04-23 RX ADMIN — DICYCLOMINE HYDROCHLORIDE 20 MG: 20 TABLET ORAL at 08:12

## 2019-04-23 RX ADMIN — BENZTROPINE MESYLATE 2 MG: 2 TABLET ORAL at 08:15

## 2019-04-23 RX ADMIN — ATORVASTATIN CALCIUM 40 MG: 20 TABLET, FILM COATED ORAL at 20:36

## 2019-04-23 RX ADMIN — BREXPIPRAZOLE 2 MG: 2 TABLET ORAL at 08:12

## 2019-04-23 RX ADMIN — BENZTROPINE MESYLATE 1 MG: 1 TABLET ORAL at 20:35

## 2019-04-23 RX ADMIN — LITHIUM CARBONATE 450 MG: 300 CAPSULE, GELATIN COATED ORAL at 18:06

## 2019-04-23 RX ADMIN — Medication 1 CAPSULE: at 08:12

## 2019-04-23 RX ADMIN — PANTOPRAZOLE SODIUM 40 MG: 40 TABLET, DELAYED RELEASE ORAL at 08:12

## 2019-04-23 RX ADMIN — LINACLOTIDE 290 MCG: 145 CAPSULE, GELATIN COATED ORAL at 08:12

## 2019-04-23 RX ADMIN — TRAZODONE HYDROCHLORIDE 25 MG: 50 TABLET ORAL at 20:36

## 2019-04-23 RX ADMIN — VITAMIN D, TAB 1000IU (100/BT) 2000 UNITS: 25 TAB at 08:12

## 2019-04-23 RX ADMIN — DICYCLOMINE HYDROCHLORIDE 20 MG: 20 TABLET ORAL at 13:16

## 2019-04-23 NOTE — BH NOTE
Department of Psychiatry  Attending Progress Note  Chief Complaint: Bipolar 1 disorder, depressed ([address]     SUBJECTIVE:    Patient complains of mood fluctuation and feeling depressed and sad. She is anxious and agitated. She is restless. Her lithium levels as today were 1.2 mainly E: Ends up. They were at the higher end of the spectrum. With the temperature changes and another warming up I would like to reduce the lithium dosage to 450 MG 2 times a day from 600 MG p.o. b.i.d.  I sent a request for another lithium level this morning. Her lithium was held last night and this morning. Patient still has suicidal thoughts. She is unable to focus. She has poor attention and concentration. She feels depressed. She has no insight. Her judgment is impaired. OBJECTIVE    Physical  /83   Pulse 81   Temp 97.8 °F (36.6 °C) (Oral)   Resp 15   Ht 5' 7\" (1.702 m)   Wt 210 lb (95.3 kg)   SpO2 95%   BMI 32.89 kg/m²      Mental Status Evaluation:    Patient is cooperative. She has adequate eye contact. She has adequate rapport. Her psychomotor activity is increased. Her mood subjectively up and down. Objectively M mildly labile mood and affect. Her speech is mildly pressured. Her thought process is within normal limits. Thought content predominantly of depression sadness suicidal thoughts agitation and anxiety. She said that she is unable to focus or concentrate. She has poor attention and concentration. Her judgment and insight are impaired. She is of average intelligence. She is oriented to time place and person. Her memory to recent remote and immediate events are within normal limits. Her abstraction is concrete.   Medications  Current Facility-Administered Medications   Medication Dose Route Frequency Provider Last Rate Last Dose    brexpiprazole (REXULTI) tablet 2 mg  2 mg Oral Daily Miquel BRUCE MD   2 mg at 04/23/19 0812    buPROPion [facility]) tablet 100 mg  100 mg Oral hydroxide (MILK OF MAGNESIA) 400 MG/5ML suspension 30 mL  30 mL Oral Daily PRN Elaina Devries APRN - CNS        aluminum & magnesium hydroxide-simethicone (MAALOX) 200-200-20 MG/5ML suspension 30 mL  30 mL Oral Q6H PRN Tasha Hanson APRN - CNS             brexpiprazole  2 mg Oral Daily    buPROPion  100 mg Oral Daily    norethindrone-ethinyl estradiol  1 tablet Oral Daily    benztropine  1 mg Oral Nightly    atorvastatin  40 mg Oral Nightly    lithium  600 mg Oral BID WC    benztropine  2 mg Oral QAM    dicyclomine  20 mg Oral TID    lactobacillus  1 capsule Oral BID WC    linaclotide  290 mcg Oral QAM AC    pantoprazole  40 mg Oral Daily    traZODone  25 mg Oral Nightly    vitamin D  2,000 Units Oral Daily    vilazodone HCl  40 mg Oral Daily       ASSESSMENT  Bipolar 1 disorder, depressed (HCC)     Patient's Response to Treatment: negative    PLAN  Dragon voice recognition software used in portions of this document. Plan to reduce lithium to 450 MG p.o. b.i.d. and get a lithium level this morning. We will continue to monitor her progress.

## 2019-04-23 NOTE — GROUP NOTE
Group Therapy Note    Date: April 23    Group Start Time: 1330  Group End Time: 4408  Group Topic: Cognitive Skills    SILVIO Calderon, CTRS    Pt did not participate in Cognitive Skills-Stress Management/Coping Skills Group despite staff encouragement.         Signature:  Bobby Roper

## 2019-04-23 NOTE — GROUP NOTE
Group Therapy Note    Date: April 23    Group Start Time: 1600  Group End Time: 1625  Group Topic: Healthy Living/Wellness    [facility]    Renée Novoa RN        Group Therapy Note    Attendees: 10/20         Patient's Goal:  Smoking Cessation    Notes:  Patient Participated    Status After Intervention:  Improved    Participation Level:  Active Listener    Participation Quality: Appropriate      Speech:  normal      Thought Process/Content: Logical      Affective Functioning: Congruent      Mood: euthymic      Level of consciousness:  Oriented x4      Response to Learning: Able to verbalize current knowledge/experience      Endings: None Reported    Modes of Intervention: Education      Discipline Responsible: Registered Nurse      Signature:  Renée Novoa RN

## 2019-04-23 NOTE — GROUP NOTE
Group Therapy Note    Date: April 23    Group Start Time: 1000  Group End Time: 6042  Group Topic: Psychotherapy    Χαλκοκονδύλcorin Snyder, LSW        Group Therapy Note    Attendees: 8         Patient's Goal:  Expression of feeling    Notes:      Status After Intervention:  Unchanged    Participation Level:  Active Listener    Participation Quality: Appropriate and Attentive      Speech:  normal      Thought Process/Content: Logical      Affective Functioning: Congruent      Mood: euthymic      Level of consciousness:  Alert and Oriented x4      Response to Learning: Able to verbalize current knowledge/experience, Able to verbalize/acknowledge new learning and Able to retain information      Endings: None Reported    Modes of Intervention: Education and Support      Discipline Responsible: /Counselor      Signature:  ISA Gupta

## 2019-04-23 NOTE — PLAN OF CARE
Luis Raman states her depression has improved. Anxiety continues. Mostly regarding \"not being discharged today yet\". She's currently in group at this time. She states she slept well last night.

## 2019-04-23 NOTE — GROUP NOTE
Group Therapy Note    Date: April 22    Group Start Time: 2030  Group End Time: 2050  Group Topic: Wrap-Up    SILVIO Villasenor RN        Group Therapy Note    Attendees: 10         Patient's Goal:  Worry less    Notes:  PT states she had a good day and did not become to anxious and used coping skills when she started to worry about something    Status After Intervention:  Improved    Participation Level: Interactive    Participation Quality: Attentive, Sharing and Supportive      Speech:  hesitant      Thought Process/Content: Logical      Affective Functioning: Flat      Mood: anxious      Level of consciousness:  Oriented x4      Response to Learning: Able to verbalize/acknowledge new learning      Endings: None Reported    Modes of Intervention: Support and Socialization      Discipline Responsible: Registered Nurse      Signature:  Joyce Villasenor RN

## 2019-04-23 NOTE — GROUP NOTE
Group Therapy Note    Date: April 23    Group Start Time: 0900  Group End Time: 0930  Group Topic: Community Meeting    ELIZABETH Gonzales    Patient's Goal:  Go to groups, discharge planning. Status After Intervention:  Improved    Participation Level:  Active Listener and Interactive    Participation Quality: Appropriate, Attentive and Sharing    Speech:  normal    Thought Process/Content: Logical    Affective Functioning: Congruent    Level of consciousness:  Alert, Oriented x4 and Attentive    Response to Learning: Able to verbalize current knowledge/experience, Capable of insight and Progressing to goal    Endings: None Reported    Modes of Intervention: Education, Socialization, Exploration, Problem-solving and Activity    Discipline Responsible: Psychoeducational Specialist    Signature:  Jw Pham

## 2019-04-23 NOTE — PLAN OF CARE
Problem: Altered Mood, Depressive Behavior:  Goal: Absence of self-harm  Description  Absence of self-harm  Outcome: Ongoing  Note:   Pt denies suicidal ideation. Pt denies feeling depressed. Pt states she feels safe and ready to be discharged home. Pt safety maintained q15 minute checks.       Problem: Depressive Behavior With or Without Suicide Precautions:  Goal: Participates in care planning  Description  Participates in care planning  Outcome: Ongoing     Problem: Suicide risk  Goal: Provide patient with safe environment  Description  Provide patient with safe environment  Outcome: Ongoing

## 2019-04-24 VITALS
OXYGEN SATURATION: 95 % | HEIGHT: 67 IN | BODY MASS INDEX: 32.96 KG/M2 | SYSTOLIC BLOOD PRESSURE: 107 MMHG | WEIGHT: 210 LBS | RESPIRATION RATE: 14 BRPM | DIASTOLIC BLOOD PRESSURE: 72 MMHG | HEART RATE: 89 BPM | TEMPERATURE: 97.5 F

## 2019-04-24 PROCEDURE — 99239 HOSP IP/OBS DSCHRG MGMT >30: CPT | Performed by: PSYCHIATRY & NEUROLOGY

## 2019-04-24 PROCEDURE — 6370000000 HC RX 637 (ALT 250 FOR IP): Performed by: PSYCHIATRY & NEUROLOGY

## 2019-04-24 PROCEDURE — 5130000000 HC BRIDGE APPOINTMENT

## 2019-04-24 RX ORDER — LITHIUM CARBONATE 150 MG/1
450 CAPSULE ORAL 2 TIMES DAILY WITH MEALS
Qty: 60 CAPSULE | Refills: 0 | Status: ON HOLD | OUTPATIENT
Start: 2019-04-24 | End: 2019-07-20

## 2019-04-24 RX ORDER — ALPRAZOLAM 0.5 MG/1
0.5 TABLET ORAL DAILY PRN
Qty: 45 TABLET | Refills: 0 | Status: SHIPPED | OUTPATIENT
Start: 2019-04-24 | End: 2019-05-24

## 2019-04-24 RX ORDER — TRAZODONE HYDROCHLORIDE 50 MG/1
50 TABLET ORAL NIGHTLY PRN
Qty: 30 TABLET | Refills: 0 | Status: ON HOLD | OUTPATIENT
Start: 2019-04-24 | End: 2019-07-24 | Stop reason: SDUPTHER

## 2019-04-24 RX ORDER — BUPROPION HYDROCHLORIDE 100 MG/1
100 TABLET ORAL DAILY
Qty: 30 TABLET | Refills: 0 | Status: ON HOLD | OUTPATIENT
Start: 2019-04-25 | End: 2019-07-24 | Stop reason: HOSPADM

## 2019-04-24 RX ORDER — BENZTROPINE MESYLATE 1 MG/1
1 TABLET ORAL NIGHTLY
Qty: 60 TABLET | Refills: 0 | Status: ON HOLD | OUTPATIENT
Start: 2019-04-24 | End: 2019-07-20

## 2019-04-24 RX ORDER — VILAZODONE HYDROCHLORIDE 40 MG/1
40 TABLET ORAL DAILY
Qty: 30 TABLET | Refills: 0 | Status: ON HOLD | OUTPATIENT
Start: 2019-04-25 | End: 2019-07-20

## 2019-04-24 RX ORDER — HYDROXYZINE HYDROCHLORIDE 25 MG/1
25 TABLET, FILM COATED ORAL 3 TIMES DAILY PRN
Qty: 90 TABLET | Refills: 0 | Status: SHIPPED | OUTPATIENT
Start: 2019-04-24 | End: 2019-05-24

## 2019-04-24 RX ORDER — ATORVASTATIN CALCIUM 40 MG/1
40 TABLET, FILM COATED ORAL NIGHTLY
Qty: 30 TABLET | Refills: 0 | Status: SHIPPED | OUTPATIENT
Start: 2019-04-24 | End: 2020-05-30

## 2019-04-24 RX ORDER — LACTOBACILLUS RHAMNOSUS GG 10B CELL
1 CAPSULE ORAL 2 TIMES DAILY WITH MEALS
Qty: 60 CAPSULE | Refills: 0 | Status: SHIPPED | OUTPATIENT
Start: 2019-04-24

## 2019-04-24 RX ORDER — PANTOPRAZOLE SODIUM 40 MG/1
40 TABLET, DELAYED RELEASE ORAL DAILY
Qty: 30 TABLET | Refills: 0 | Status: SHIPPED | OUTPATIENT
Start: 2019-04-25

## 2019-04-24 RX ADMIN — LINACLOTIDE 290 MCG: 145 CAPSULE, GELATIN COATED ORAL at 07:35

## 2019-04-24 RX ADMIN — VILAZODONE HYDROCHLORIDE 40 MG: 40 TABLET ORAL at 07:34

## 2019-04-24 RX ADMIN — LITHIUM CARBONATE 450 MG: 300 CAPSULE, GELATIN COATED ORAL at 07:34

## 2019-04-24 RX ADMIN — PANTOPRAZOLE SODIUM 40 MG: 40 TABLET, DELAYED RELEASE ORAL at 07:35

## 2019-04-24 RX ADMIN — BREXPIPRAZOLE 2 MG: 2 TABLET ORAL at 07:34

## 2019-04-24 RX ADMIN — VITAMIN D, TAB 1000IU (100/BT) 2000 UNITS: 25 TAB at 07:35

## 2019-04-24 RX ADMIN — DICYCLOMINE HYDROCHLORIDE 20 MG: 20 TABLET ORAL at 07:35

## 2019-04-24 RX ADMIN — BENZTROPINE MESYLATE 2 MG: 2 TABLET ORAL at 07:34

## 2019-04-24 RX ADMIN — BUPROPION HYDROCHLORIDE 100 MG: 100 TABLET, FILM COATED ORAL at 07:34

## 2019-04-24 RX ADMIN — ALPRAZOLAM 0.5 MG: 0.5 TABLET ORAL at 08:24

## 2019-04-24 RX ADMIN — Medication 1 CAPSULE: at 07:35

## 2019-04-24 NOTE — PLAN OF CARE
Problem: Altered Mood, Depressive Behavior:  Goal: Absence of self-harm  Description  Absence of self-harm  4/23/2019 2121 by Víctor South LPN  Outcome: Ongoing  Note:   Patient denies thoughts of self harm at this time. Patient agrees to seek out staff if they begin having thoughts of harming self or they need to talk. Q15min safety checks continue      Problem: Depressive Behavior With or Without Suicide Precautions:  Goal: Participates in care planning  Description  Participates in care planning  4/23/2019 2121 by Víctor South LPN  Outcome: Ongoing  Note:   Patient denies suicidal thoughts and hallucinations. She reports some increased anxiety related to wanting to be discharged and having issues with her lithium level. Patient stated she actually feels the best she has felt in a long time. She stated she has had an increase in mental health issues in the past 6 months and has been hospitalized a few times. She stated she felt hopeless and worried that she would never feel better, but she actually feels like she did before the problems began. She states she is ready for discharge and hopes to go home tomorrow. She has been out in the milieu, selectively social and calm.  Q15min safety checks continue     Problem: Suicide risk  Goal: Provide patient with safe environment  Description  Provide patient with safe environment  Outcome: Ongoing

## 2019-04-24 NOTE — GROUP NOTE
Group Therapy Note    Date: April 24    Group Start Time: 1000  Group End Time: 1071  Group Topic: Psychotherapy    Χαλκοκονδύλcorin Snyder, LSW        Group Therapy Note    Attendees: 9         Patient's Goal:  Expression of feeling    Notes:      Status After Intervention:  Unchanged    Participation Level:  Active Listener    Participation Quality: Appropriate and Attentive      Speech:  normal      Thought Process/Content: Logical      Affective Functioning: Congruent      Mood: euthymic      Level of consciousness:  Alert and Oriented x4      Response to Learning: Able to verbalize current knowledge/experience and Able to verbalize/acknowledge new learning      Endings: None Reported    Modes of Intervention: Education, Support and Socialization      Discipline Responsible: /Counselor      Signature:  ISA Guevara

## 2019-04-24 NOTE — DISCHARGE SUMMARY
DISCHARGE SUMMARY  Patient ID:  Juanita Millan  [id number]  47 y.o.  1995    Admit date: 4/15/2019    Discharge date and time: 4/24/2019  10:11 AM     Admitting Physician: Antonia Smallwood MD     Discharge Physician:  Manisha Paredes MD    Admission Diagnoses: Severe recurrent major depression without psychotic features ([facility] [address] [F33.2]  Bipolar 1 disorder, depressed ([address] [F31.9]    Discharge Diagnoses:   Bipolar 1 disorder, depressed ([address]     Patient Active Problem List   Diagnosis Code    Major depressive disorder, recurrent episode, severe with anxious distress ([address] F33.2    Major depression, recurrent ([address] F33.9    PCOS (polycystic ovarian syndrome) E28.2    Reflex sympathetic dystrophy G90.50    Myalgia and myositis, unspecified [id number]    Acne vulgaris L70.0    Severe recurrent major depression without psychotic features ([address] F33.2    Bipolar 1 disorder, depressed ([address] F31.9    Depression with suicidal ideation F32.9, R45.851        Admission Condition: poor    Discharged Condition: stable    Indication for Admission: threat to self    History of Present Illnes (present tense wording indicates findings from admission exam on 4/15/2019 and are not necessarily indicative of current findings): Hospital Course:   Upon admission, Juanita Millan was provided a safe secure environment, introduced to unit milieu. Patient participated in groups and individual therapies. Meds were adjusted. After few days of hospital care, patient began to feel improvement. Depression lifted, thoughts to harm self ceased. Sleep improved, appetite was good. On morning rounds 4/24/2019, patient endorsed feeling ready for discharge. Patient denies suicidal or homicidal ideations, denies hallucinations or delusions. Denies SE's from meds.   It was decided that pt had achieved maximum benefit from hospital care and can be discharged     Consults:   None    Significant Diagnostic Studies: Routine labs and diagnostics    Treatments: Psychotropic medications, therapy with group, milieu, and 1:1 with nurses, social workers, PANAVJOT/CNP, and Attending physician. Discharge Medications:  Current Discharge Medication List      START taking these medications    Details   hydrOXYzine (ATARAX) 25 MG tablet Take 1 tablet by mouth 3 times daily as needed for Anxiety  Qty: 90 tablet, Refills: 0      buPROPion (WELLBUTRIN) 100 MG tablet Take 1 tablet by mouth daily  Qty: 30 tablet, Refills: 0      atorvastatin (LIPITOR) 40 MG tablet Take 1 tablet by mouth nightly  Qty: 30 tablet, Refills: 0      brexpiprazole (REXULTI) 2 MG TABS tablet Take 1 tablet by mouth daily  Qty: 30 tablet, Refills: 0         CONTINUE these medications which have CHANGED    Details   ALPRAZolam (XANAX) 0.5 MG tablet Take 1 tablet by mouth daily as needed for Anxiety for up to 30 days.   Qty: 45 tablet, Refills: 0    Associated Diagnoses: Bipolar 1 disorder, depressed (HCC)      traZODone (DESYREL) 50 MG tablet Take 1 tablet by mouth nightly as needed for Sleep  Qty: 30 tablet, Refills: 0      vilazodone HCl (VIIBRYD) 40 MG TABS Take 1 tablet by mouth daily  Qty: 30 tablet, Refills: 0      lactobacillus (CULTURELLE) capsule Take 1 capsule by mouth 2 times daily (with meals)  Qty: 60 capsule, Refills: 0      benztropine (COGENTIN) 1 MG tablet Take 1 tablet by mouth nightly  Qty: 60 tablet, Refills: 0      lithium 150 MG capsule Take 3 capsules by mouth 2 times daily (with meals)  Qty: 60 capsule, Refills: 0      pantoprazole (PROTONIX) 40 MG tablet Take 1 tablet by mouth daily  Qty: 30 tablet, Refills: 0         CONTINUE these medications which have NOT CHANGED    Details   tazarotene (TAZORAC) 0.05 % cream Apply topically daily to affected area      linaclotide (LINZESS) 290 MCG CAPS capsule Take 290 mcg by mouth every morning (before breakfast)      vitamin D (CHOLECALCIFEROL) 1000 UNIT TABS tablet Take 2 tablets by mouth daily  Qty: 14 tablet, Refills: 0 Norethindrone-Eth Estradiol (ALYACEN 1/35 PO) Take by mouth daily         STOP taking these medications       dicyclomine (BENTYL) 20 MG tablet Comments:   Reason for Stopping:         FLUoxetine (PROZAC) 10 MG tablet Comments:   Reason for Stopping:         escitalopram (LEXAPRO) 10 MG tablet Comments:   Reason for Stopping:         lithium 300 MG tablet Comments:   Reason for Stopping:                Discharge Exam:  Level of consciousness:  Within normal limits  Appearance: Street clothes, seated, with good grooming  Behavior/Motor: No abnormalities noted  Attitude toward examiner:  Cooperative, attentive, good eye contact  Speech:  spontaneous, normal rate, normal volume and well articulated  Mood:  euthymic  Affect:  mood congruent  Thought processes:  linear, goal directed and coherent  Thought content:  Homocidal ideation denies  Suicidal Ideation:  denies suicidal ideation  Delusions:  no evidence of delusions  Perceptual Disturbance:  denies any perceptual disturbance  Cognition:  In tact  Memory: age appropriate  Insight & Judgement: fair  Medication side effects:  denies     Disposition: home    Patient Instructions: Activity: activity as tolerated    Follow-up as scheduled with [facility]     Time Spent: 35 minutes    Engagement: Patient displayed a good level of engagement with the treatments offered during this admission. Discharge planning, findings, and recommendations were discussed with patient   Signed:  Ijeoma Alvarez   4/24/2019  10:11 AM  Dragon voice recognition software used in portions of this document.

## 2019-07-19 PROBLEM — F31.9 BIPOLAR 1 DISORDER (HCC): Status: ACTIVE | Noted: 2019-07-19

## 2019-07-19 RX ORDER — TRAZODONE HYDROCHLORIDE 50 MG/1
50 TABLET ORAL NIGHTLY PRN
Status: DISCONTINUED | OUTPATIENT
Start: 2019-07-20 | End: 2019-07-19

## 2019-07-20 ENCOUNTER — HOSPITAL ENCOUNTER (INPATIENT)
Age: 24
LOS: 4 days | Discharge: HOME | DRG: 885 | End: 2019-07-24
Attending: PSYCHIATRY & NEUROLOGY | Admitting: PSYCHIATRY & NEUROLOGY
Payer: COMMERCIAL

## 2019-07-20 LAB
LITHIUM DATE LAST DOSE: ABNORMAL
LITHIUM DOSE AMOUNT: ABNORMAL
LITHIUM DOSE TIME: ABNORMAL
LITHIUM LEVEL: 0.4 MMOL/L (ref 0.6–1.2)

## 2019-07-20 PROCEDURE — 90792 PSYCH DIAG EVAL W/MED SRVCS: CPT | Performed by: NURSE PRACTITIONER

## 2019-07-20 PROCEDURE — 80178 ASSAY OF LITHIUM: CPT

## 2019-07-20 PROCEDURE — 1240000000 HC EMOTIONAL WELLNESS R&B

## 2019-07-20 PROCEDURE — 6370000000 HC RX 637 (ALT 250 FOR IP): Performed by: NURSE PRACTITIONER

## 2019-07-20 PROCEDURE — 36415 COLL VENOUS BLD VENIPUNCTURE: CPT

## 2019-07-20 RX ORDER — PANTOPRAZOLE SODIUM 40 MG/1
40 TABLET, DELAYED RELEASE ORAL DAILY
Status: DISCONTINUED | OUTPATIENT
Start: 2019-07-20 | End: 2019-07-24 | Stop reason: HOSPADM

## 2019-07-20 RX ORDER — BUPROPION HYDROCHLORIDE 150 MG/1
150 TABLET ORAL DAILY
Status: DISCONTINUED | OUTPATIENT
Start: 2019-07-20 | End: 2019-07-23

## 2019-07-20 RX ORDER — HYDROXYZINE HYDROCHLORIDE 25 MG/1
25 TABLET, FILM COATED ORAL 3 TIMES DAILY PRN
Status: DISCONTINUED | OUTPATIENT
Start: 2019-07-20 | End: 2019-07-24 | Stop reason: HOSPADM

## 2019-07-20 RX ORDER — CLONAZEPAM 0.5 MG/1
0.25 TABLET ORAL 2 TIMES DAILY PRN
Status: DISCONTINUED | OUTPATIENT
Start: 2019-07-20 | End: 2019-07-24 | Stop reason: HOSPADM

## 2019-07-20 RX ORDER — TRAZODONE HYDROCHLORIDE 50 MG/1
50 TABLET ORAL NIGHTLY PRN
Status: DISCONTINUED | OUTPATIENT
Start: 2019-07-20 | End: 2019-07-24 | Stop reason: HOSPADM

## 2019-07-20 RX ORDER — NICOTINE 21 MG/24HR
1 PATCH, TRANSDERMAL 24 HOURS TRANSDERMAL DAILY
Status: DISCONTINUED | OUTPATIENT
Start: 2019-07-20 | End: 2019-07-20

## 2019-07-20 RX ORDER — LAMOTRIGINE 25 MG/1
50 TABLET ORAL NIGHTLY
Status: DISCONTINUED | OUTPATIENT
Start: 2019-08-04 | End: 2019-07-24 | Stop reason: HOSPADM

## 2019-07-20 RX ORDER — BENZTROPINE MESYLATE 1 MG/ML
2 INJECTION INTRAMUSCULAR; INTRAVENOUS 2 TIMES DAILY PRN
Status: DISCONTINUED | OUTPATIENT
Start: 2019-07-20 | End: 2019-07-24 | Stop reason: HOSPADM

## 2019-07-20 RX ORDER — IVERMECTIN 10 MG/G
CREAM TOPICAL
COMMUNITY

## 2019-07-20 RX ORDER — ACETAMINOPHEN 325 MG/1
650 TABLET ORAL EVERY 4 HOURS PRN
Status: DISCONTINUED | OUTPATIENT
Start: 2019-07-20 | End: 2019-07-24 | Stop reason: HOSPADM

## 2019-07-20 RX ORDER — CEFUROXIME AXETIL 250 MG/1
250 TABLET ORAL EVERY 12 HOURS SCHEDULED
Status: DISCONTINUED | OUTPATIENT
Start: 2019-07-20 | End: 2019-07-24 | Stop reason: HOSPADM

## 2019-07-20 RX ORDER — TAZAROTENE 1 MG/G
1 CREAM TOPICAL NIGHTLY
Status: DISCONTINUED | OUTPATIENT
Start: 2019-07-20 | End: 2019-07-24 | Stop reason: HOSPADM

## 2019-07-20 RX ORDER — LAMOTRIGINE 25 MG/1
25 TABLET ORAL NIGHTLY
Status: DISCONTINUED | OUTPATIENT
Start: 2019-07-20 | End: 2019-07-24 | Stop reason: HOSPADM

## 2019-07-20 RX ORDER — KETOCONAZOLE 20 MG/ML
SHAMPOO TOPICAL PRN
Status: DISCONTINUED | OUTPATIENT
Start: 2019-07-20 | End: 2019-07-24 | Stop reason: HOSPADM

## 2019-07-20 RX ORDER — ATORVASTATIN CALCIUM 20 MG/1
40 TABLET, FILM COATED ORAL NIGHTLY
Status: DISCONTINUED | OUTPATIENT
Start: 2019-07-20 | End: 2019-07-24 | Stop reason: HOSPADM

## 2019-07-20 RX ORDER — ESCITALOPRAM OXALATE 10 MG/1
10 TABLET ORAL DAILY
Status: DISCONTINUED | OUTPATIENT
Start: 2019-07-20 | End: 2019-07-24 | Stop reason: HOSPADM

## 2019-07-20 RX ORDER — MAGNESIUM HYDROXIDE/ALUMINUM HYDROXICE/SIMETHICONE 120; 1200; 1200 MG/30ML; MG/30ML; MG/30ML
30 SUSPENSION ORAL EVERY 6 HOURS PRN
Status: DISCONTINUED | OUTPATIENT
Start: 2019-07-20 | End: 2019-07-24 | Stop reason: HOSPADM

## 2019-07-20 RX ADMIN — TRAZODONE HYDROCHLORIDE 50 MG: 50 TABLET ORAL at 21:37

## 2019-07-20 RX ADMIN — BUPROPION HYDROCHLORIDE 150 MG: 150 TABLET, FILM COATED, EXTENDED RELEASE ORAL at 12:48

## 2019-07-20 RX ADMIN — HYDROXYZINE HYDROCHLORIDE 25 MG: 25 TABLET, FILM COATED ORAL at 21:37

## 2019-07-20 RX ADMIN — LAMOTRIGINE 25 MG: 25 TABLET ORAL at 21:41

## 2019-07-20 RX ADMIN — ESCITALOPRAM OXALATE 10 MG: 10 TABLET ORAL at 10:37

## 2019-07-20 RX ADMIN — PANTOPRAZOLE SODIUM 40 MG: 40 TABLET, DELAYED RELEASE ORAL at 12:48

## 2019-07-20 RX ADMIN — CLONAZEPAM 0.25 MG: 0.5 TABLET ORAL at 19:05

## 2019-07-20 RX ADMIN — CEFUROXIME AXETIL 250 MG: 250 TABLET ORAL at 21:37

## 2019-07-20 RX ADMIN — HYDROXYZINE HYDROCHLORIDE 25 MG: 25 TABLET, FILM COATED ORAL at 10:37

## 2019-07-20 RX ADMIN — VITAMIN D, TAB 1000IU (100/BT) 2000 UNITS: 25 TAB at 14:56

## 2019-07-20 RX ADMIN — ATORVASTATIN CALCIUM 40 MG: 20 TABLET, FILM COATED ORAL at 21:38

## 2019-07-20 RX ADMIN — TAZAROTENE 1 APPLICATOR: 1 CREAM TOPICAL at 21:38

## 2019-07-20 ASSESSMENT — SLEEP AND FATIGUE QUESTIONNAIRES
DO YOU USE A SLEEP AID: YES
SLEEP PATTERN: DIFFICULTY FALLING ASLEEP;DISTURBED/INTERRUPTED SLEEP
AVERAGE NUMBER OF SLEEP HOURS: 6
DIFFICULTY STAYING ASLEEP: YES
DIFFICULTY ARISING: NO
DIFFICULTY FALLING ASLEEP: YES
RESTFUL SLEEP: NO
DO YOU HAVE DIFFICULTY SLEEPING: YES

## 2019-07-20 ASSESSMENT — LIFESTYLE VARIABLES: HISTORY_ALCOHOL_USE: NO

## 2019-07-20 ASSESSMENT — PATIENT HEALTH QUESTIONNAIRE - PHQ9: SUM OF ALL RESPONSES TO PHQ QUESTIONS 1-9: 13

## 2019-07-20 NOTE — GROUP NOTE
Group Therapy Note    Date: July 20    Group Start Time: 1100  Group End Time: 2052  Group Topic: Psychoeducation    [facility]    Scot Rouse        Group Therapy Note    Attendees: 8/20         Patient's Goal:  Participate appropriately in Psychoeducational group \"My past and future holds\" to improve insight and positive coping skills. Notes : Pt came to group late but did sit attentively while peers shared    Status After Intervention:  Improved    Participation Level:  Active Listener    Participation Quality: Appropriate      Speech:  normal      Thought Process/Content: Logical      Affective Functioning: Congruent      Mood: stable      Level of consciousness:  Alert and Oriented x4      Response to Learning: Able to verbalize current knowledge/experience      Endings: None Reported    Modes of Intervention: Education, Support and Activity      Discipline Responsible: Behavorial Health Tech      Signature:  Scot Rouse

## 2019-07-20 NOTE — GROUP NOTE
Group Therapy Note    Date: July 20    Group Start Time: 1340  Group End Time: 1022  Group Topic: Cognitive Skills    SILVIO COATS    [place], [place]    Attendees: 11         Patient's Goal:  To demonstrate increased interpersonal skills. Notes:  Patient attended group and actively participated in task at hand. Patient conversed appropriately with peers and Juan Jones. Status After Intervention:  Improved    Participation Level:  Active Listener and Interactive    Participation Quality: Appropriate, Attentive and Sharing      Speech:  normal      Thought Process/Content: Logical      Affective Functioning: Congruent      Mood: euthymic      Level of consciousness:  Alert, Oriented x4 and Attentive      Response to Learning: Able to verbalize current knowledge/experience, Able to verbalize/acknowledge new learning, Able to retain information, Capable of insight and Progressing to goal      Endings: None Reported       Modes of Intervention: Support, Socialization, Exploration, Clarifying, Problem-solving, Confrontation, Limit-setting and Reality-testing      Discipline Responsible: Psychoeducational Specialist      Signature:  Bonny Gonzalez

## 2019-07-21 LAB
ABSOLUTE EOS #: 0.1 K/UL (ref 0–0.4)
ABSOLUTE IMMATURE GRANULOCYTE: ABNORMAL K/UL (ref 0–0.3)
ABSOLUTE LYMPH #: 1.9 K/UL (ref 1–4.8)
ABSOLUTE MONO #: 0.5 K/UL (ref 0.1–1.3)
ALBUMIN SERPL-MCNC: 3.7 G/DL (ref 3.5–5.2)
ALBUMIN/GLOBULIN RATIO: NORMAL (ref 1–2.5)
ALP BLD-CCNC: 70 U/L (ref 35–104)
ALT SERPL-CCNC: 18 U/L (ref 5–33)
ANION GAP SERPL CALCULATED.3IONS-SCNC: 12 MMOL/L (ref 9–17)
AST SERPL-CCNC: 13 U/L
BASOPHILS # BLD: 0 % (ref 0–2)
BASOPHILS ABSOLUTE: 0 K/UL (ref 0–0.2)
BILIRUB SERPL-MCNC: 0.41 MG/DL (ref 0.3–1.2)
BUN BLDV-MCNC: 9 MG/DL (ref 6–20)
BUN/CREAT BLD: NORMAL (ref 9–20)
CALCIUM SERPL-MCNC: 8.8 MG/DL (ref 8.6–10.4)
CHLORIDE BLD-SCNC: 103 MMOL/L (ref 98–107)
CHOLESTEROL/HDL RATIO: 4.9
CHOLESTEROL: 186 MG/DL
CO2: 23 MMOL/L (ref 20–31)
CREAT SERPL-MCNC: 0.72 MG/DL (ref 0.5–0.9)
DIFFERENTIAL TYPE: ABNORMAL
EOSINOPHILS RELATIVE PERCENT: 1 % (ref 0–4)
ESTIMATED AVERAGE GLUCOSE: 74 MG/DL
GFR AFRICAN AMERICAN: >60 ML/MIN
GFR NON-AFRICAN AMERICAN: >60 ML/MIN
GFR SERPL CREATININE-BSD FRML MDRD: NORMAL ML/MIN/{1.73_M2}
GFR SERPL CREATININE-BSD FRML MDRD: NORMAL ML/MIN/{1.73_M2}
GLUCOSE BLD-MCNC: 94 MG/DL (ref 70–99)
HBA1C MFR BLD: 4.2 % (ref 4–6)
HCT VFR BLD CALC: 39.8 % (ref 36–46)
HDLC SERPL-MCNC: 38 MG/DL
HEMOGLOBIN: 13.2 G/DL (ref 12–16)
IMMATURE GRANULOCYTES: ABNORMAL %
LDL CHOLESTEROL: 89 MG/DL (ref 0–130)
LYMPHOCYTES # BLD: 21 % (ref 24–44)
MCH RBC QN AUTO: 29.1 PG (ref 26–34)
MCHC RBC AUTO-ENTMCNC: 33.1 G/DL (ref 31–37)
MCV RBC AUTO: 87.9 FL (ref 80–100)
MONOCYTES # BLD: 6 % (ref 1–7)
NRBC AUTOMATED: ABNORMAL PER 100 WBC
PDW BLD-RTO: 13 % (ref 11.5–14.9)
PLATELET # BLD: 273 K/UL (ref 150–450)
PLATELET ESTIMATE: ABNORMAL
PMV BLD AUTO: 7.8 FL (ref 6–12)
POTASSIUM SERPL-SCNC: 4.2 MMOL/L (ref 3.7–5.3)
RBC # BLD: 4.53 M/UL (ref 4–5.2)
RBC # BLD: ABNORMAL 10*6/UL
SEG NEUTROPHILS: 72 % (ref 36–66)
SEGMENTED NEUTROPHILS ABSOLUTE COUNT: 6.4 K/UL (ref 1.3–9.1)
SODIUM BLD-SCNC: 138 MMOL/L (ref 135–144)
THYROXINE, FREE: 0.97 NG/DL (ref 0.93–1.7)
TOTAL PROTEIN: 6.6 G/DL (ref 6.4–8.3)
TRIGL SERPL-MCNC: 293 MG/DL
TSH SERPL DL<=0.05 MIU/L-ACNC: 2.45 MIU/L (ref 0.3–5)
VLDLC SERPL CALC-MCNC: ABNORMAL MG/DL (ref 1–30)
WBC # BLD: 9 K/UL (ref 3.5–11)
WBC # BLD: ABNORMAL 10*3/UL

## 2019-07-21 PROCEDURE — 80061 LIPID PANEL: CPT

## 2019-07-21 PROCEDURE — 83036 HEMOGLOBIN GLYCOSYLATED A1C: CPT

## 2019-07-21 PROCEDURE — 84443 ASSAY THYROID STIM HORMONE: CPT

## 2019-07-21 PROCEDURE — 36415 COLL VENOUS BLD VENIPUNCTURE: CPT

## 2019-07-21 PROCEDURE — 6370000000 HC RX 637 (ALT 250 FOR IP): Performed by: NURSE PRACTITIONER

## 2019-07-21 PROCEDURE — 80053 COMPREHEN METABOLIC PANEL: CPT

## 2019-07-21 PROCEDURE — 84439 ASSAY OF FREE THYROXINE: CPT

## 2019-07-21 PROCEDURE — 85025 COMPLETE CBC W/AUTO DIFF WBC: CPT

## 2019-07-21 PROCEDURE — 1240000000 HC EMOTIONAL WELLNESS R&B

## 2019-07-21 PROCEDURE — 99232 SBSQ HOSP IP/OBS MODERATE 35: CPT | Performed by: NURSE PRACTITIONER

## 2019-07-21 RX ORDER — IVERMECTIN 10 MG/G
1 CREAM TOPICAL DAILY PRN
Status: DISCONTINUED | OUTPATIENT
Start: 2019-07-21 | End: 2019-07-24 | Stop reason: HOSPADM

## 2019-07-21 RX ORDER — IVERMECTIN 10 MG/G
1 CREAM TOPICAL DAILY
Status: DISCONTINUED | OUTPATIENT
Start: 2019-07-21 | End: 2019-07-21

## 2019-07-21 RX ADMIN — ACETAMINOPHEN 650 MG: 325 TABLET, FILM COATED ORAL at 17:19

## 2019-07-21 RX ADMIN — HYDROXYZINE HYDROCHLORIDE 25 MG: 25 TABLET, FILM COATED ORAL at 08:28

## 2019-07-21 RX ADMIN — BUPROPION HYDROCHLORIDE 150 MG: 150 TABLET, FILM COATED, EXTENDED RELEASE ORAL at 08:50

## 2019-07-21 RX ADMIN — PANTOPRAZOLE SODIUM 40 MG: 40 TABLET, DELAYED RELEASE ORAL at 08:50

## 2019-07-21 RX ADMIN — ESCITALOPRAM OXALATE 10 MG: 10 TABLET ORAL at 08:50

## 2019-07-21 RX ADMIN — CEFUROXIME AXETIL 250 MG: 250 TABLET ORAL at 08:50

## 2019-07-21 RX ADMIN — TRAZODONE HYDROCHLORIDE 50 MG: 50 TABLET ORAL at 21:14

## 2019-07-21 RX ADMIN — HYDROXYZINE HYDROCHLORIDE 25 MG: 25 TABLET, FILM COATED ORAL at 15:25

## 2019-07-21 RX ADMIN — VITAMIN D, TAB 1000IU (100/BT) 2000 UNITS: 25 TAB at 08:50

## 2019-07-21 RX ADMIN — LAMOTRIGINE 25 MG: 25 TABLET ORAL at 21:14

## 2019-07-21 RX ADMIN — ATORVASTATIN CALCIUM 40 MG: 20 TABLET, FILM COATED ORAL at 21:15

## 2019-07-21 RX ADMIN — ACETAMINOPHEN 650 MG: 325 TABLET, FILM COATED ORAL at 08:21

## 2019-07-21 RX ADMIN — CEFUROXIME AXETIL 250 MG: 250 TABLET ORAL at 21:15

## 2019-07-21 ASSESSMENT — PAIN SCALES - GENERAL
PAINLEVEL_OUTOF10: 5
PAINLEVEL_OUTOF10: 1
PAINLEVEL_OUTOF10: 5
PAINLEVEL_OUTOF10: 1
PAINLEVEL_OUTOF10: 5

## 2019-07-21 ASSESSMENT — PAIN DESCRIPTION - PAIN TYPE: TYPE: ACUTE PAIN

## 2019-07-21 ASSESSMENT — PAIN DESCRIPTION - LOCATION: LOCATION: HEAD

## 2019-07-21 NOTE — GROUP NOTE
Group Therapy Note    Date: July 21    Group Start Time: 0845  Group End Time: 8838  Group Topic: Community Meeting    [facility] ELIZABETH Bermeo    Pt did not attend 0845 community meeting/ goals  group d/t resting in room despite staff invitation to attend. talk time offered as alternative to group session, pt declined.           Signature:  Valentin Mckinney

## 2019-07-21 NOTE — GROUP NOTE
Group Therapy Note    Date: July 21    Group Start Time: 1600  Group End Time: 1630  Group Topic: Relaxation    [facility]    Ulysses Harris LPN        Group Therapy Note    Attendees:8         Patient's Goal:  Relaxation    Notes:      Status After Intervention:  Improved    Participation Level:  Active Listener    Participation Quality: Appropriate      Speech:  normal      Thought Process/Content: Logical      Affective Functioning: Congruent      Mood: calm      Level of consciousness:  Alert and Oriented x4      Response to Learning: Able to verbalize current knowledge/experience      Endings: None Reported    Modes of Intervention: Support      Discipline Responsible: Licensed Practical Nurse      Signature:  Ulysses Harris LPN

## 2019-07-22 PROCEDURE — 6370000000 HC RX 637 (ALT 250 FOR IP): Performed by: NURSE PRACTITIONER

## 2019-07-22 PROCEDURE — 99232 SBSQ HOSP IP/OBS MODERATE 35: CPT | Performed by: PSYCHIATRY & NEUROLOGY

## 2019-07-22 PROCEDURE — 1240000000 HC EMOTIONAL WELLNESS R&B

## 2019-07-22 RX ADMIN — BUPROPION HYDROCHLORIDE 150 MG: 150 TABLET, FILM COATED, EXTENDED RELEASE ORAL at 08:14

## 2019-07-22 RX ADMIN — HYDROXYZINE HYDROCHLORIDE 25 MG: 25 TABLET, FILM COATED ORAL at 21:19

## 2019-07-22 RX ADMIN — VITAMIN D, TAB 1000IU (100/BT) 2000 UNITS: 25 TAB at 08:13

## 2019-07-22 RX ADMIN — CEFUROXIME AXETIL 250 MG: 250 TABLET ORAL at 21:20

## 2019-07-22 RX ADMIN — TAZAROTENE 1 APPLICATORFUL: 1 CREAM TOPICAL at 21:18

## 2019-07-22 RX ADMIN — ATORVASTATIN CALCIUM 40 MG: 20 TABLET, FILM COATED ORAL at 21:11

## 2019-07-22 RX ADMIN — TRAZODONE HYDROCHLORIDE 50 MG: 50 TABLET ORAL at 21:19

## 2019-07-22 RX ADMIN — PANTOPRAZOLE SODIUM 40 MG: 40 TABLET, DELAYED RELEASE ORAL at 08:14

## 2019-07-22 RX ADMIN — HYDROXYZINE HYDROCHLORIDE 25 MG: 25 TABLET, FILM COATED ORAL at 08:13

## 2019-07-22 RX ADMIN — CEFUROXIME AXETIL 250 MG: 250 TABLET ORAL at 08:14

## 2019-07-22 RX ADMIN — ESCITALOPRAM OXALATE 10 MG: 10 TABLET ORAL at 08:14

## 2019-07-22 RX ADMIN — LAMOTRIGINE 25 MG: 25 TABLET ORAL at 21:11

## 2019-07-22 NOTE — PROGRESS NOTES
Psychiatric Progress Note - Psychiatric Nurse Practitioner      Pertinent History & Psychiatric Examination    Subjective findings:    Pipe Hassan was interviewed in day room today. She continues to feel very depressed and hopeless at times. She has significant anxiety and reported little improvement in her mood and less crying spells. She denied any visual or auditory hallucinations. She said she sleeps well at night but feels \"heavy\" in the morning. Continued to have suicidal ideations but less than before. No major side effects found. There was subjective feeling of shaking only. There is no alternative setting other than the hospital that she can function in safely at this time. Complaints of Pain: none  Functioning Relationships: good support system      Mental Status Evaluation:  Orientation: alertness: alert   Mood:. anxious      Affect:  flat      Appearance:  overweight   Activity:  Within Normal Limits   Gait/Posture: Normal   Speech:  normal pitch and normal volume   Thought Process:  circumstantial   Thought Content:  Racing thoughts   Sensorium:  person, place, time/date and situation   Cognition:  grossly intact   Memory: intact   Insight:  partial   Judgment: partial   Suicidal Ideations: Less suicidal ideation   Homicidal Ideations: Negative for homicidal ideation      Medication Side Effects: absent       Attention Span attention span and concentration were age appropriate     Clinical Assessment Medical Decision    Axis I: Major Depression, Rec        Precautions with Justification:   None    Medication Review/Mgmt: Medications reviewed - no changes made    Medical Issues: See Chart    Assessment of Risk for Harm to Self/Others:  None    PLAN  · Continue inpatient psychiatric treatment  · Supportive therapy with medication management. Reviewed risks and benefits as well as potential side effects with patient. · Continue current medications including Lamictal Wellbutrin and Lexapro.   · Xanax that she was prescribed was changed to Klonopin 0.25 7/20/19 and this should be used as a last resort  · Engage in therapeutic activities and groups. · Follow up at [facility] mental health [place] after symptoms stabilized.       Anticipated Discharge Date: TBD    Patient's Response to Treatment: gume Pichardo  7/22/2019  2:30 PM

## 2019-07-22 NOTE — PLAN OF CARE
Problem: Altered Mood, Depressive Behavior:  Goal: Able to verbalize and/or display a decrease in depressive symptoms  Description  LTG Able to verbalize and/or display a decrease in depressive symptoms   7/21/2019 2155 by Mayte York RN  Outcome: Ongoing     Problem: Altered Mood, Depressive Behavior:  Goal: Absence of self-harm  Description  STG Absence of self-harm   7/21/2019 2155 by Mayte York RN  Outcome: Ongoing   Patient denies homicidal ideation, visual hallucinations, and auditory hallucinations. Patient admits to fleeting suicidal ideation stating \"I have been having bad suicidal thoughts\". She says her brother is coming to see her tomorrow and it's making her anxious because \"He is the perfect child\". Patient is free of self harm this shift and will continue to be provided a safe environment. Patient is able to maintain ADL's and is social with peers. She attended group and is behavioral control and med compliant. Q15 minute checks maintained.

## 2019-07-22 NOTE — PLAN OF CARE
Problem: Altered Mood, Depressive Behavior:  Goal: Able to verbalize and/or display a decrease in depressive symptoms  Description  LTG Able to verbalize and/or display a decrease in depressive symptoms   Outcome: Ongoing  Pt stated that she has been feeling very depressed and anxious. She said that she is not feeling suicidal now but if she was home she doesn't know how she would feel. She stated that she has been feeling very tired but that she has been attending groups. She was medication compliant. Problem: Altered Mood, Depressive Behavior:  Goal: Absence of self-harm  Description  STG Absence of self-harm   Outcome: Ongoing  Pt did not have any episodes of self harm. Problem: Pain:  Goal: Pain level will decrease  Description  Pain level will decrease  Outcome: Ongoing  Pt denied pain. Problem: Pain:  Goal: Control of acute pain  Description  Control of acute pain  Outcome: Ongoing  Pt denied pain. Problem: Pain:  Goal: Control of chronic pain  Description  Control of chronic pain  Outcome: Ongoing  Pt denied pain.

## 2019-07-23 PROCEDURE — 6370000000 HC RX 637 (ALT 250 FOR IP): Performed by: NURSE PRACTITIONER

## 2019-07-23 PROCEDURE — 99232 SBSQ HOSP IP/OBS MODERATE 35: CPT | Performed by: PSYCHIATRY & NEUROLOGY

## 2019-07-23 PROCEDURE — 1240000000 HC EMOTIONAL WELLNESS R&B

## 2019-07-23 RX ORDER — BUPROPION HYDROCHLORIDE 300 MG/1
300 TABLET ORAL DAILY
Status: DISCONTINUED | OUTPATIENT
Start: 2019-07-24 | End: 2019-07-24 | Stop reason: HOSPADM

## 2019-07-23 RX ADMIN — CEFUROXIME AXETIL 250 MG: 250 TABLET ORAL at 20:37

## 2019-07-23 RX ADMIN — VITAMIN D, TAB 1000IU (100/BT) 2000 UNITS: 25 TAB at 08:13

## 2019-07-23 RX ADMIN — PANTOPRAZOLE SODIUM 40 MG: 40 TABLET, DELAYED RELEASE ORAL at 08:14

## 2019-07-23 RX ADMIN — BUPROPION HYDROCHLORIDE 150 MG: 150 TABLET, FILM COATED, EXTENDED RELEASE ORAL at 08:14

## 2019-07-23 RX ADMIN — HYDROXYZINE HYDROCHLORIDE 25 MG: 25 TABLET, FILM COATED ORAL at 20:35

## 2019-07-23 RX ADMIN — ATORVASTATIN CALCIUM 40 MG: 20 TABLET, FILM COATED ORAL at 20:35

## 2019-07-23 RX ADMIN — TAZAROTENE 1 APPLICATOR: 1 CREAM TOPICAL at 20:38

## 2019-07-23 RX ADMIN — TRAZODONE HYDROCHLORIDE 50 MG: 50 TABLET ORAL at 20:35

## 2019-07-23 RX ADMIN — LAMOTRIGINE 25 MG: 25 TABLET ORAL at 20:35

## 2019-07-23 RX ADMIN — ESCITALOPRAM OXALATE 10 MG: 10 TABLET ORAL at 08:14

## 2019-07-23 RX ADMIN — CEFUROXIME AXETIL 250 MG: 250 TABLET ORAL at 08:13

## 2019-07-23 NOTE — GROUP NOTE
Group Therapy Note    Date: July 23    Group Start Time: 1000  Group End Time: 2702  Group Topic: Recreational    [facility] [address], [place]        Group Therapy Note    Attendees: 7         Patient's Goal:  To increase interpersonal skills     Notes:  Patient attended group and participated fully     Status After Intervention:  Improved    Participation Level:  Active Listener and Interactive    Participation Quality: Appropriate, Attentive and Sharing      Speech:  normal      Thought Process/Content: Logical      Affective Functioning: Congruent      Mood: euthymic      Level of consciousness:  Alert and Oriented x4      Response to Learning: Progressing to goal      Endings: None Reported    Modes of Intervention: Socialization and Activity      Discipline Responsible: Psychoeducational Specialist      Signature:  Sania Rodriguez

## 2019-07-24 VITALS
SYSTOLIC BLOOD PRESSURE: 107 MMHG | DIASTOLIC BLOOD PRESSURE: 52 MMHG | HEIGHT: 67 IN | TEMPERATURE: 97.6 F | HEART RATE: 79 BPM | RESPIRATION RATE: 14 BRPM | WEIGHT: 219 LBS | BODY MASS INDEX: 34.37 KG/M2 | OXYGEN SATURATION: 100 %

## 2019-07-24 PROCEDURE — 99238 HOSP IP/OBS DSCHRG MGMT 30/<: CPT | Performed by: PSYCHIATRY & NEUROLOGY

## 2019-07-24 PROCEDURE — 6370000000 HC RX 637 (ALT 250 FOR IP): Performed by: PSYCHIATRY & NEUROLOGY

## 2019-07-24 PROCEDURE — 6370000000 HC RX 637 (ALT 250 FOR IP): Performed by: NURSE PRACTITIONER

## 2019-07-24 PROCEDURE — 5130000000 HC BRIDGE APPOINTMENT

## 2019-07-24 RX ORDER — BUPROPION HYDROCHLORIDE 300 MG/1
300 TABLET ORAL DAILY
Qty: 30 TABLET | Refills: 0 | Status: SHIPPED | OUTPATIENT
Start: 2019-07-25

## 2019-07-24 RX ORDER — LAMOTRIGINE 25 MG/1
25 TABLET ORAL NIGHTLY
Qty: 10 TABLET | Refills: 0 | Status: SHIPPED | OUTPATIENT
Start: 2019-07-24

## 2019-07-24 RX ORDER — TRAZODONE HYDROCHLORIDE 50 MG/1
50 TABLET ORAL NIGHTLY PRN
Qty: 30 TABLET | Refills: 0 | Status: SHIPPED | OUTPATIENT
Start: 2019-07-24 | End: 2020-05-30

## 2019-07-24 RX ORDER — LAMOTRIGINE 25 MG/1
50 TABLET ORAL NIGHTLY
Qty: 30 TABLET | Refills: 3 | Status: SHIPPED | OUTPATIENT
Start: 2019-08-04

## 2019-07-24 RX ORDER — HYDROXYZINE HYDROCHLORIDE 25 MG/1
25 TABLET, FILM COATED ORAL 3 TIMES DAILY PRN
Qty: 30 TABLET | Refills: 0 | Status: SHIPPED | OUTPATIENT
Start: 2019-07-24 | End: 2019-08-03

## 2019-07-24 RX ORDER — ESCITALOPRAM OXALATE 10 MG/1
10 TABLET ORAL DAILY
Qty: 30 TABLET | Refills: 0 | Status: SHIPPED | OUTPATIENT
Start: 2019-07-25

## 2019-07-24 RX ADMIN — PANTOPRAZOLE SODIUM 40 MG: 40 TABLET, DELAYED RELEASE ORAL at 08:13

## 2019-07-24 RX ADMIN — BUPROPION HYDROCHLORIDE 300 MG: 300 TABLET, FILM COATED, EXTENDED RELEASE ORAL at 08:13

## 2019-07-24 RX ADMIN — CEFUROXIME AXETIL 250 MG: 250 TABLET ORAL at 08:14

## 2019-07-24 RX ADMIN — VITAMIN D, TAB 1000IU (100/BT) 2000 UNITS: 25 TAB at 08:13

## 2019-07-24 RX ADMIN — ESCITALOPRAM OXALATE 10 MG: 10 TABLET ORAL at 08:13

## 2019-07-24 NOTE — GROUP NOTE
Group Therapy Note    Date: July 24    Group Start Time: 0900  Group End Time: 8244  Group Topic: [facility]    Patient did not attend [employer] and Goal Setting Group after encouragement from staff. 1:1 talk time offered but patient refused.       Signature:  William Witt

## 2019-07-24 NOTE — DISCHARGE SUMMARY
lamoTRIgine (LAMICTAL) 25 MG tablet Take 1 tablet by mouth nightly  Qty: 10 tablet, Refills: 0      !! lamoTRIgine (LAMICTAL) 25 MG tablet Take 2 tablets by mouth nightly  Qty: 30 tablet, Refills: 3      buPROPion (WELLBUTRIN XL) 300 MG extended release tablet Take 1 tablet by mouth daily  Qty: 30 tablet, Refills: 0      escitalopram (LEXAPRO) 10 MG tablet Take 1 tablet by mouth daily  Qty: 30 tablet, Refills: 0       !! - Potential duplicate medications found. Please discuss with provider.       CONTINUE these medications which have CHANGED    Details   traZODone (DESYREL) 50 MG tablet Take 1 tablet by mouth nightly as needed for Sleep  Qty: 30 tablet, Refills: 0         CONTINUE these medications which have NOT CHANGED    Details   Plecanatide (TRULANCE) 3 MG TABS Take 3 mg by mouth daily      Ivermectin 1 % CREA Apply topically      lactobacillus (CULTURELLE) capsule Take 1 capsule by mouth 2 times daily (with meals)  Qty: 60 capsule, Refills: 0      atorvastatin (LIPITOR) 40 MG tablet Take 1 tablet by mouth nightly  Qty: 30 tablet, Refills: 0      pantoprazole (PROTONIX) 40 MG tablet Take 1 tablet by mouth daily  Qty: 30 tablet, Refills: 0      tazarotene (TAZORAC) 0.05 % cream Apply topically daily to affected area      linaclotide (LINZESS) 290 MCG CAPS capsule Take 290 mcg by mouth every morning (before breakfast)      vitamin D (CHOLECALCIFEROL) 1000 UNIT TABS tablet Take 2 tablets by mouth daily  Qty: 14 tablet, Refills: 0      Norethindrone-Eth Estradiol (ALYACEN 1/35 PO) Take by mouth daily         STOP taking these medications       buPROPion (WELLBUTRIN) 100 MG tablet Comments:   Reason for Stopping:         vilazodone HCl (VIIBRYD) 40 MG TABS Comments:   Reason for Stopping:         benztropine (COGENTIN) 1 MG tablet Comments:   Reason for Stopping:         brexpiprazole (REXULTI) 2 MG TABS tablet Comments:   Reason for Stopping:         lithium 150 MG capsule Comments:   Reason for Stopping:

## 2019-07-24 NOTE — PROGRESS NOTES
Psychiatric Progress Note - Psychiatric Nurse Practitioner      Pertinent History & Psychiatric Examination    Subjective findings:      Carolyn Marmolejo was interviewed her room today. She continues to feel very depressed and hopeless at times. She reported improvement in her mood swings and anxiety however. She said she had suicidal thoughts yesterday and was thinking about hanging herself. No major side effects found. There was subjective feeling of shaking only. There is no alternative setting other than the hospital that she can function in safely at this time. Complaints of Pain: none  Functioning Relationships: good support system      Mental Status Evaluation:  Orientation: alertness: alert   Mood:. anxious      Affect:  flat      Appearance:  overweight   Activity:  Within Normal Limits   Gait/Posture: Normal   Speech:  normal pitch and normal volume   Thought Process:  circumstantial   Thought Content:  Racing thoughts   Sensorium:  person, place, time/date and situation   Cognition:  grossly intact   Memory: intact   Insight:  partial   Judgment: partial   Suicidal Ideations: Less suicidal ideation   Homicidal Ideations: Negative for homicidal ideation      Medication Side Effects: absent       Attention Span attention span and concentration were age appropriate     Clinical Assessment Medical Decision    Axis I: Major Depression, Rec        Precautions with Justification:   None    Medication Review/Mgmt: Medications reviewed - no changes made    Medical Issues: See Chart    Assessment of Risk for Harm to Self/Others:  None    PLAN  · Continue inpatient psychiatric treatment  · Increase Wellbutrin XL to 300 mg daily. · Continue current medications including Lamictal and Lexapro. · Xanax that she was prescribed was changed to Klonopin 0.25 7/20/19 and this should be used as a last resort  · Engage in therapeutic activities and groups.   · Follow up at [facility] health [place] after symptoms stabilized. · Consider discharge soon.       Anticipated Discharge Date: TBD    Patient's Response to Treatment: gume    Lauro Kerry  7/23/2019  10:38 PM

## 2019-07-24 NOTE — BH NOTE
Did not attend schedules group. Accepting of 1:1 talk time with write.
RN reviewed charting for this shift.
moods swing from feeling okay to feeling depressed and anxious. She denies any history of hallucinations. She has a history of inpatient hospitalization and was last hospitalized in February of this year. Chart and medications reviewed. Therapeutic support, empathetic care and psycho education provided greater than 20 minutes. At this time there is no safe alternative other than inpatient care. Past Psychiatric History   Patient reports current outpatient psychiatric linkage. Reported history of psychiatric inpatient hospitalizations. Denied history of suicide attempts. History of Substance Abuse     Denies alcohol use or use of any illicit drugs. Family History of psychiatric disorders    Family history: positive for her mother has depression, anxiety, and bipolar disorder    Past psychiatric medications  Lexapro - reports she did well on this medication  Trileptal - resulted in unsteady gait    Medical History   Allergies:  Phenergan [promethazine hcl]   Past Medical History:   Diagnosis Date    Depression     Gastritis     PCOS (polycystic ovarian syndrome)     Psychiatric problem     Reflex sympathetic dystrophy 7/19/2005      Past Surgical History:   Procedure Laterality Date    OVARIAN CYST REMOVAL       Neurologic Exam     Mental Status   Attention: normal. Concentration: normal.   Speech: speech is normal   Level of consciousness: alert      See H&P for further physical examination. SOCIAL HISTORY   Tamara Paulino was born in [place] and raised in [place]. She was raised by her mother and her step-father. She identifies them as supportive and is currently living with them. Her dad was never around. She has 1 brother and 1 step-brother. She has no children. She finished high school and attended some college, but is not currently attending. She has no legal issues. She denies any alcohol or illicit drug use, and is a non-smoker. She denies any history of abuse.          Mental Status  Pt. was

## 2019-07-24 NOTE — DISCHARGE INSTR - COC
Continuity of Care Form    Patient Name: Monster Peoples   :  1995  MRN:  [id number]    Admit date:  2019  Discharge date:  ***    Code Status Order: Full Code   Advance Directives:   Advance Care Flowsheet Documentation     Date/Time Healthcare Directive Type of Healthcare Directive Copy in [address] Agent's Name Healthcare Agent's Phone Number    [phone]  No, patient does not have an advance directive for healthcare treatment -- -- -- -- --          Admitting Physician:  Jose Smith MD  PCP: No primary care provider on file. Discharging Nurse: [facility] Unit/Room#: 0102/0102-02  Discharging Unit Phone Number: ***    Emergency Contact:   Extended Emergency Contact Information  Primary Emergency Contact: Tonya Etienne  Address: [address] [place] [address]. [address] Phone: [phone]  Relation: Parent    Past Surgical History:  Past Surgical History:   Procedure Laterality Date    OVARIAN CYST REMOVAL         Immunization History: There is no immunization history on file for this patient.     Active Problems:  Patient Active Problem List   Diagnosis Code    Major depressive disorder, recurrent episode, severe with anxious distress ([address] F33.2    Major depression, recurrent (HCC) F33.9    PCOS (polycystic ovarian syndrome) E28.2    Reflex sympathetic dystrophy G90.50    Myalgia and myositis, unspecified [id number]    Acne vulgaris L70.0    Severe recurrent major depression without psychotic features ([address] F33.2    Bipolar 1 disorder, depressed ([address] F31.9    Depression with suicidal ideation F32.9, R45.851    Bipolar 1 disorder (HCC) F31.9       Isolation/Infection:   Isolation          No Isolation            Nurse Assessment:  Last Vital Signs: BP (!) 107/52   Pulse 79   Temp 97.6 °F (36.4 °C) (Oral)   Resp 14   Ht 5' 7\" (1.702 m)   Wt 219 lb (99.3 kg)   SpO2 100%   BMI 34.30 kg/m²     Last

## 2020-03-30 ENCOUNTER — NURSE TRIAGE (OUTPATIENT)
Dept: OTHER | Facility: CLINIC | Age: 25
End: 2020-03-30

## 2020-05-30 ENCOUNTER — HOSPITAL ENCOUNTER (EMERGENCY)
Age: 25
Discharge: HOME | End: 2020-05-30
Attending: EMERGENCY MEDICINE
Payer: COMMERCIAL

## 2020-05-30 VITALS
WEIGHT: 251.2 LBS | TEMPERATURE: 98.2 F | HEART RATE: 93 BPM | SYSTOLIC BLOOD PRESSURE: 103 MMHG | DIASTOLIC BLOOD PRESSURE: 56 MMHG | BODY MASS INDEX: 39.34 KG/M2 | RESPIRATION RATE: 15 BRPM | OXYGEN SATURATION: 99 %

## 2020-05-30 LAB
BACTERIA WET PREP: ABNORMAL
BILIRUBIN URINE: NEGATIVE
BLOOD, URINE: NEGATIVE
CLARITY: CLEAR
CLUE CELLS: ABNORMAL
COLOR: YELLOW
EPITHELIAL CELLS WET PREP: ABNORMAL
GLUCOSE URINE: NEGATIVE MG/DL
HCG(URINE) PREGNANCY TEST: NEGATIVE
KETONES, URINE: NEGATIVE MG/DL
LEUKOCYTE ESTERASE, URINE: NEGATIVE
MICROSCOPIC EXAMINATION: NORMAL
NITRITE, URINE: NEGATIVE
PH UA: 7 (ref 5–8)
PROTEIN UA: NEGATIVE MG/DL
RBC WET PREP: ABNORMAL
SOURCE WET PREP: ABNORMAL
SPECIFIC GRAVITY UA: 1.02 (ref 1–1.03)
TRICHOMONAS PREP: ABNORMAL
URINE TYPE: NORMAL
UROBILINOGEN, URINE: 1 E.U./DL
WBC WET PREP: ABNORMAL
YEAST WET PREP: ABNORMAL

## 2020-05-30 PROCEDURE — 87591 N.GONORRHOEAE DNA AMP PROB: CPT

## 2020-05-30 PROCEDURE — 99283 EMERGENCY DEPT VISIT LOW MDM: CPT

## 2020-05-30 PROCEDURE — 81003 URINALYSIS AUTO W/O SCOPE: CPT

## 2020-05-30 PROCEDURE — 87210 SMEAR WET MOUNT SALINE/INK: CPT

## 2020-05-30 PROCEDURE — 84703 CHORIONIC GONADOTROPIN ASSAY: CPT

## 2020-05-30 PROCEDURE — 87491 CHLMYD TRACH DNA AMP PROBE: CPT

## 2020-05-30 RX ORDER — NAPROXEN 500 MG/1
500 TABLET ORAL 2 TIMES DAILY PRN
Qty: 20 TABLET | Refills: 0 | Status: SHIPPED | OUTPATIENT
Start: 2020-05-30 | End: 2021-01-23

## 2020-05-30 RX ORDER — METRONIDAZOLE 500 MG/1
500 TABLET ORAL 2 TIMES DAILY
Qty: 14 TABLET | Refills: 0 | Status: SHIPPED | OUTPATIENT
Start: 2020-05-30 | End: 2020-06-06

## 2020-05-30 NOTE — ED NOTES
Patient given prescription,  discharge instructions verbal and written, patient verbalized understanding. Alert/oriented X4, Clear speech.   Patient exhibits no distress, ambulates with steady gait per self leaving unit, no further request.     Suzi Gray RN  05/30/20 8583

## 2020-06-01 LAB
C TRACH DNA GENITAL QL NAA+PROBE: NEGATIVE
N. GONORRHOEAE DNA: NEGATIVE

## 2021-01-23 ENCOUNTER — HOSPITAL ENCOUNTER (EMERGENCY)
Age: 26
Discharge: HOME | End: 2021-01-23
Attending: EMERGENCY MEDICINE
Payer: COMMERCIAL

## 2021-01-23 VITALS
DIASTOLIC BLOOD PRESSURE: 67 MMHG | TEMPERATURE: 98.5 F | WEIGHT: 240.8 LBS | SYSTOLIC BLOOD PRESSURE: 109 MMHG | HEIGHT: 67 IN | OXYGEN SATURATION: 97 % | HEART RATE: 84 BPM | BODY MASS INDEX: 37.79 KG/M2 | RESPIRATION RATE: 16 BRPM

## 2021-01-23 DIAGNOSIS — B37.31 MONILIAL VAGINITIS: ICD-10-CM

## 2021-01-23 DIAGNOSIS — R10.2 PELVIC PAIN IN FEMALE: Primary | ICD-10-CM

## 2021-01-23 DIAGNOSIS — E28.2 PCOS (POLYCYSTIC OVARIAN SYNDROME): ICD-10-CM

## 2021-01-23 LAB
BACTERIA: ABNORMAL /HPF
BILIRUBIN URINE: NEGATIVE
BLOOD, URINE: NEGATIVE
CLARITY: CLEAR
COLOR: YELLOW
EPITHELIAL CELLS, UA: ABNORMAL /HPF (ref 0–5)
GLUCOSE URINE: NEGATIVE MG/DL
HCG(URINE) PREGNANCY TEST: NEGATIVE
KETONES, URINE: NEGATIVE MG/DL
LEUKOCYTE ESTERASE, URINE: ABNORMAL
MICROSCOPIC EXAMINATION: YES
NITRITE, URINE: NEGATIVE
PH UA: 7 (ref 5–8)
PROTEIN UA: NEGATIVE MG/DL
RBC UA: ABNORMAL /HPF (ref 0–4)
SPECIFIC GRAVITY UA: 1.02 (ref 1–1.03)
URINE TYPE: ABNORMAL
UROBILINOGEN, URINE: 1 E.U./DL
WBC UA: ABNORMAL /HPF (ref 0–5)
YEAST: PRESENT /HPF

## 2021-01-23 PROCEDURE — 84703 CHORIONIC GONADOTROPIN ASSAY: CPT

## 2021-01-23 PROCEDURE — 99283 EMERGENCY DEPT VISIT LOW MDM: CPT

## 2021-01-23 PROCEDURE — 81001 URINALYSIS AUTO W/SCOPE: CPT

## 2021-01-23 PROCEDURE — 6370000000 HC RX 637 (ALT 250 FOR IP): Performed by: EMERGENCY MEDICINE

## 2021-01-23 RX ORDER — NAPROXEN 500 MG/1
500 TABLET ORAL ONCE
Status: COMPLETED | OUTPATIENT
Start: 2021-01-23 | End: 2021-01-23

## 2021-01-23 RX ORDER — NAPROXEN 500 MG/1
500 TABLET ORAL 2 TIMES DAILY PRN
Qty: 20 TABLET | Refills: 1 | Status: SHIPPED | OUTPATIENT
Start: 2021-01-23

## 2021-01-23 RX ADMIN — NAPROXEN 500 MG: 500 TABLET ORAL at 13:23

## 2021-01-23 ASSESSMENT — PAIN SCALES - GENERAL
PAINLEVEL_OUTOF10: 7
PAINLEVEL_OUTOF10: 7

## 2021-01-23 NOTE — ED TRIAGE NOTES
Patient c/o right sided pelvic and lower right back pain, vaginal pain x 1.5 months. States pain began at first across entire pelvis but has located to right side recently. Had vaginal discharge and foul odor 1.5 months ago that went away. Irregular periods. Saw PCP for symptoms recnetly and was given estrogen cream for \"cuts and irritation\" to vagina. Patient states she had intercourse yesterday and pain worsened. States all intercourse is consensual. Had IUD and takes BCPs.

## 2021-01-23 NOTE — ED PROVIDER NOTES
TRIAGE CHIEF COMPLAINT:   Chief Complaint   Patient presents with    Vaginal Pain    Pelvic Pain         HPI: Marthann Goldberg is a 26 y.o. female who presents to the Emergency Department with complaint of pelvic pain and vaginal pain. She states about a week and a half ago she had some L odorous vaginal discharge and crampy lower abdominal/pelvic pain. The discharge seem to go away and she developed some vaginal irritation. She reportedly was seen at [facility] and they told her there was nothing wrong. She states she had STD testing done about 2 months ago at [facility] which was negative and she has only 1 sexual partner. 2 days ago she was seen at [facility] for new patient GYN exam where she complained of dyspareunia,  pelvic pain and vaginal pain. It was noted that the vulva was then graying but that she had a normal vaginal, cervix and adnexal exam.  She was prescribed Premarin cream and scheduled for an ultrasound to be done on . Patient states that she had intercourse last night with her fiancé and this morning when she woke up she had some increased pelvic pain. Denies UTI symptoms. No flank pain. No fever or chills. She has no vomiting or diarrhea. She is . She has a ParaGard IUD but also is on birth control pills for hormone control and control of her acne. She has a history of bipolar disorder, PCOS and RSD. REVIEW OF SYSTEMS:  6 systems reviewed. Pertinent positives per HPI. Otherwise noted to be negative. Nursing notes reviewed and agree with above. Past medical/surgical history reviewed.     MEDICATIONS   Patient's Medications   New Prescriptions    No medications on file   Previous Medications    BUPROPION (WELLBUTRIN XL) 300 MG EXTENDED RELEASE TABLET    Take 1 tablet by mouth daily    CITALOPRAM HYDROBROMIDE (CELEXA PO)    Take by mouth    CLONAZEPAM (KLONOPIN PO)    Take by mouth    ESCITALOPRAM (LEXAPRO) 10 MG TABLET    Take 1 tablet by mouth daily HYDROXYZINE HCL PO    Take by mouth    IVERMECTIN 1 % CREA    Apply topically    LACTOBACILLUS (CULTURELLE) CAPSULE    Take 1 capsule by mouth 2 times daily (with meals)    LAMOTRIGINE (LAMICTAL) 25 MG TABLET    Take 1 tablet by mouth nightly    LAMOTRIGINE (LAMICTAL) 25 MG TABLET    Take 2 tablets by mouth nightly    NAPROXEN (NAPROSYN) 500 MG TABLET    Take 1 tablet by mouth 2 times daily as needed for Pain (with food)    NORETHINDRONE-ETH ESTRADIOL (ALYACEN 1/35 PO)    Take by mouth daily    PANTOPRAZOLE (PROTONIX) 40 MG TABLET    Take 1 tablet by mouth daily    PROPRANOLOL HCL PO    Take by mouth    TAZAROTENE (TAZORAC) 0.05 % CREAM    Apply topically daily to affected area    VITAMIN D (CHOLECALCIFEROL) 1000 UNIT TABS TABLET    Take 2 tablets by mouth daily   Modified Medications    No medications on file   Discontinued Medications    No medications on file         ALLERGIES   Allergies   Allergen Reactions    Phenergan [Promethazine Hcl]      Muscle spasm         /67   Pulse 84   Temp 98.5 °F (36.9 °C) (Oral)   Resp 16   Ht 5' 7\" (1.702 m)   Wt 240 lb 12.8 oz (109.2 kg)   LMP 01/11/2021 (Approximate) Comment: patient had IUD and takes BCPs  SpO2 97%   BMI 37.71 kg/m²   General:  No acute distress. Non toxic appearance  Head:   Normocephalic and atraumatic  Eyes:   Conjunctiva clear, HUDSON, EOM's intact. Sclera anicteric. ENT:   Mucous membranes moist  Neck:   Supple. No adenopathy or jugular venous distension  Lungs/Chest:  No respiratory distress  CVS:   Regular rate and rhythm  Abdomen: Bowel sounds are normal.  Soft and nontender. Extremities:  Full range of motion  Skin:   No rashes or lesions to exposed skin  Back:   No CVA tenderness  Neuro:  Alert and OX3. Speech clear and appropriate. No upper/lower extremity weakness. Normal sensation in all extremities. No facial asymmetry or weakness.  Gait normal.  Psych:   Affect normal. Mood normal  Pelvic exam was declined by the patient      RADIOLOGY:      LAB  Labs Reviewed   URINALYSIS - Abnormal; Notable for the following components:       Result Value    Leukocyte Esterase, Urine TRACE (*)     All other components within normal limits    Narrative:     Performed at:  [facility]  [address]  [facility], [place] [address]   Phone [phone]   MICROSCOPIC URINALYSIS - Abnormal; Notable for the following components:    WBC, UA 6-9 (*)     Epithelial Cells, UA 6-10 (*)     Bacteria, UA 2+ (*)     Yeast, UA Present (*)     All other components within normal limits    Narrative:     Performed at:  [facility]  [address],  [email or web address], [place] [address]   Phone [phone]   PREGNANCY, URINE    Narrative:     Performed at:  [facility]  [address],  [facility], [place] [address]   Phone [phone]       ED COURSE / MDM:  69-year-old female with reported history of PCOS, dyspareunia, bipolar disorder, RSD, history of ParaGard IUD but also on birth control pills for hormone manipulation and control of acne with complaints of vaginal discharge and some cramping 1-1/2 weeks ago followed by vaginal pain. She saw GYN at [facility] 2 days ago and apparently had atrophic vaginitis for which she was prescribed Premarin cream and was scheduled for an ultrasound to be done on January 25. Patient had intercourse last night and stated this morning when she woke up she noted some increased pelvic pain. She has been using the Premarin cream with some improvement of the vaginal irritation. No fever or chills. No UTI symptoms. She declines STD testing stating it was done a month or 2 ago (negative) and she has only one partner. She is afebrile with normal vital signs. Abdomen is benign. No CVA tenderness. Pelvic exam was declined. Urinalysis shows no convincing evidence for UTI. She does have yeast in the urine. UCG was negative. She was medicated here with Naprosyn with improvement of her pain. Patient was advised to have her ultrasound as scheduled on January 25. She was given a prescription for Monistat vaginal cream as well as a prescription for Naprosyn. Recommended pelvic rest.  The patient may have an ovarian cyst given her history of PCOS. There is no clinical evidence for surgical abdomen, bowel obstruction, pyelonephritis, ectopic pregnancy or ureteral colic. I discussed with Gita Lopez the results of the evaluation in the Emergency Department, diagnosis, care, prognosis and the importance of follow-up. The patient is stable for discharge. The patient and/or family are in agreement with the plan and all questions have been answered. Specific discharge instructions were explained, including reasons to return to the emergency department.       (Please note that portions of this note may have been completed with a voice recognition program.  Efforts were made to edit the dictation but occasionally words are mis-transcribed)        FINAL IMPRESSION:  1 --pelvic pain in female  2 --PCOS  3 --monilial vaginitis                   Wanda Lo MD  01/23/21 7027

## 2022-03-27 NOTE — PLAN OF CARE
Pharmacy Med Education Group Note    Date: 10/17  Start Time: 1430  End Time: 1383    Number Participants in Group:  11    Goal:  Patient will demonstrate an understanding of the medications intended purpose and possible adverse effects  Topic: [place] for Pharmacy Med Ed Group    Discipline Responsible:     OT  AT  [facility].  RT     X Other       Participation Level:     None  Minimal      X Active Listener    X Interactive    Monopolizing         Participation Quality:    X Appropriate  Inappropriate     X       Attentive        Intrusive          Sharing        Resistant          Supportive        Lethargic       Affective:     X Congruent  Incongruent  Blunted  Flat    Constricted  Anxious  Elated  Angry    Labile  Depressed  Other         Cognitive:    X Alert  Oriented PPTP     Concentration   X G  F  P   Attention Span   X G  F  P   Short-Term Memory   X G  F  P   Long-Term Memory  G  F  P   ProblemSolving/  Decision Making  G  F  P   Ability to Process  Information   X G  F  P      Contributing Factors             Delusional             Hallucinating             Flight of Ideas             Other:       Modes of Intervention:    X Education   X Support  Exploration    Clarifying  Problem Solving  Confrontation    Socialization  Limit Setting  Reality Testing    Activity  Movement  Media    Other:            Response to Learning:    X Able to verbalize current knowledge/experience    Able to verbalize/acknowledge new learning    Able to retain information    Capable of insight    Able to change behavior    Progressing to goal    Other:        Comments:
Problem: Altered Mood, Depressive Behavior:  Goal: Ability to disclose and discuss suicidal ideas will improve  Ability to disclose and discuss suicidal ideas will improve   Outcome: Ongoing  Met with pt 1:1 , pt able to verbalize issues and express concerns to staff as needed. Pt encouraged to attend groups and identify ways to cope with depression and anxiety is elevated related to agitated patients on unit. Pt remains in behavioral control and takes all medspt denies SI and remains free from self harm.
Problem: Altered Mood, Depressive Behavior:  Goal: Able to verbalize and/or display a decrease in depressive symptoms  Able to verbalize and/or display a decrease in depressive symptoms    Outcome: Ongoing  Patient was not able to verbalize a decrease in depressive symptoms.
Problem: Altered Mood, Depressive Behavior:  Goal: Able to verbalize and/or display a decrease in depressive symptoms  Able to verbalize and/or display a decrease in depressive symptoms    Outcome: Ongoing  Pt is very anxious and tearful. She states that she is feeling more anxious. She stated that she was having a difficult time staying in groups. She sat in the sensory room to decrease anxiety. She was medication compliant. Goal: Absence of self-harm  Absence of self-harm    Outcome: Ongoing  Pt denied current suicidal ideations.
Problem: Altered Mood, Depressive Behavior:  Goal: Able to verbalize and/or display a decrease in depressive symptoms  Able to verbalize and/or display a decrease in depressive symptoms    Outcome: Ongoing  Pt. Is very anxious this morning. Ate poorly for breakfast and complained of an upset stomach. Took meds except for Adderall. Feels it makes her sick. Pt. Took prn for anxiety from nurse as requested. Pt. Remains safe on the unit. Q 15 minute checks for safety maintained.
Problem: Altered Mood, Depressive Behavior:  Goal: Able to verbalize and/or display a decrease in depressive symptoms  Able to verbalize and/or display a decrease in depressive symptoms   [facility]  Initial Interdisciplinary Treatment Plan NO      Original treatment plan Date & Time: 10/12/18    Admission Type:       Reason for admission:        Estimated Length of Stay:  5-7days  Estimated Discharge Date: to be determined by physician    PATIENT STRENGTHS:  Patient Strengths:Strengths: Communication, Motivated, Connection to output provider, Positive Support, Medication Compliance  Patient Strengths and Limitations:Limitations: Tendency to isolate self  Addictive Behavior: Addictive Behavior  In the past 3 months, have you felt or has someone told you that you have a problem with:  : None  Do you have a history of Chemical Use?: No  Do you have a history of Alcohol Use?: No  Do you have a history of Street Drug Abuse?: No  Histroy of Prescripton Drug Abuse?: No  Medical Problems:  Past Medical History:   Diagnosis Date    Gastritis     PCOS (polycystic ovarian syndrome)      Status EXAM:Status and Exam  Normal: No  Facial Expression: Flat  Affect: Appropriate  Level of Consciousness: Alert  Mood:Normal: No  Mood: Depressed, Anxious  Motor Activity:Normal: No  Motor Activity: Decreased  Interview Behavior: Cooperative  Preception: [place] to Person, [place] to Time, [place] to Place, [place] to Situation  Attention:Normal: Yes  Thought Processes: Other(See comment) (Logical )  Thought Content:Normal: No  Thought Content: Preoccupations  Hallucinations: None  Delusions: No  Memory:Normal: No  Insight and Judgment: No  Insight and Judgment: Poor Judgment, Poor Insight  Present Suicidal Ideation: Yes  Present Homicidal Ideation: No    EDUCATION:   Learner Progress Toward Treatment Goals: reviewed group plans and strategies for care    Method:group therapy, medication compliance, individualized
Problem: Altered Mood, Depressive Behavior:  Goal: Able to verbalize and/or display a decrease in depressive symptoms  Able to verbalize and/or display a decrease in depressive symptoms   Outcome: Ongoing  Patient denies SI, agreeable to remain safe while on the unit and will seek out staff if she should become suicidal. Denies depression, but mild anxiety. Out in TV room and socializing with peers. States she should be ready to go home in a few more days. She states she is feeling much better. Goal: Able to verbalize support systems  Able to verbalize support systems   Outcome: Ongoing  Patient states that her Mother is her biggest support system. Goal: Absence of self-harm  Absence of self-harm   Outcome: Ongoing  Patient remains free of self-harm and is agreeable to remain safe while on the unit. Q 15 min safety checks continue per the unit protocol.
Problem: Altered Mood, Depressive Behavior:  Goal: Able to verbalize and/or display a decrease in depressive symptoms  Able to verbalize and/or display a decrease in depressive symptoms   Outcome: Ongoing  Pt visible on the unit, attends groups, mostly isolates to herself with minimal interactions with peers. Pt denies SI, reports continued anxiety with an improvement in her depression. Pt encouraged to seek out staff as needed, will monitor for safety and offer support as needed.
Problem: Altered Mood, Depressive Behavior:  Goal: Able to verbalize and/or display a decrease in depressive symptoms  Able to verbalize and/or display a decrease in depressive symptoms   Outcome: Ongoing  Unable to evaluate patient's level of depression. Goal: Ability to disclose and discuss suicidal ideas will improve  Ability to disclose and discuss suicidal ideas will improve   Outcome: Ongoing  Patient denies suicidal ideation. Goal: Able to verbalize support systems  Able to verbalize support systems   Outcome: Ongoing  Patient verbalizes mother as a support system. Goal: Absence of self-harm  Absence of self-harm   Outcome: Ongoing  Patient has remained free of self-harm.
Problem: Altered Mood, Depressive Behavior:  Goal: Able to verbalize and/or display a decrease in depressive symptoms  Able to verbalize and/or display a decrease in depressive symptoms   Psychoeducation Group Note    Date: 10/15/18  Start Time: 1430  End Time: 1515    Number Participants in Group:  11/23    Goal:  Patient will demonstrate increased interpersonal interaction   Topic: memory recall, concentration and socialization    Discipline Responsible:   OT  AT  [facility]. X RT  Other       Participation Level:     None  Minimal   X Active Listener X Interactive    Monopolizing         Participation Quality:  X Appropriate  Inappropriate   X       Attentive        Intrusive          Sharing        Resistant          Supportive        Lethargic       Affective:   X Congruent  Incongruent  Blunted  Flat    Constricted  Anxious  Elated  Angry    Labile  Depressed  Other         Cognitive:  X Alert X Oriented PPTP     Concentration X G  F  P   Attention Span  G  F  P   Short-Term Memory  G  F  P   Long-Term Memory  G  F  P   ProblemSolving/  Decision Making X G  F  P   Ability to Process  Information X G  F  P      Contributing Factors             Delusional             Hallucinating             Flight of Ideas             Other:       Modes of Intervention:  X Education X Support  Exploration    Clarifying X Problem Solving  Confrontation   X Socialization  Limit Setting  Reality Testing   X Activity  Movement  Media    Other:            Response to Learning:  X Able to verbalize current knowledge/experience   X Able to verbalize/acknowledge new learning   X Able to retain information   X Capable of insight    Able to change behavior    Progressing to goal    Other:        Comments:
Problem: Altered Mood, Depressive Behavior:  Goal: Able to verbalize and/or display a decrease in depressive symptoms  Able to verbalize and/or display a decrease in depressive symptoms   Psychoeducation Group Note    Date: 10/18/18  Start Time: 0845  End Time: 0915    Number Participants in Group:  11/24    Goal:  Patient will demonstrate increased interpersonal interaction   Topic: goal setting and community meeting    Discipline Responsible:   OT  AT  [facility]. X RT  Other       Participation Level:     None  Minimal   X Active Listener X Interactive    Monopolizing         Participation Quality:  X Appropriate  Inappropriate   X       Attentive        Intrusive          Sharing        Resistant          Supportive        Lethargic       Affective:   X Congruent  Incongruent  Blunted  Flat    Constricted  Anxious  Elated  Angry    Labile  Depressed  Other         Cognitive:  X Alert X Oriented PPTP     Concentration X G  F  P   Attention Span X G  F  P   Short-Term Memory  G  F  P   Long-Term Memory  G  F  P   ProblemSolving/  Decision Making X G  F  P   Ability to Process  Information X G  F  P      Contributing Factors             Delusional             Hallucinating             Flight of Ideas             Other:       Modes of Intervention:  X Education X Support X Exploration    Clarifying X Problem Solving X Confrontation   X Socialization  Limit Setting  Reality Testing   X Activity  Movement  Media    Other:            Response to Learning:  X Able to verbalize current knowledge/experience   X Able to verbalize/acknowledge new learning    Able to retain information    Capable of insight   X Able to change behavior    Progressing to goal    Other:        Comments:
Problem: Altered Mood, Depressive Behavior:  Goal: Able to verbalize and/or display a decrease in depressive symptoms  Able to verbalize and/or display a decrease in depressive symptoms   Psychoeducation Group Note    Date: 10/18/18  Start Time: 1100  End Time: 1145    Number Participants in Group:  6/11    Goal:  Patient will demonstrate increased interpersonal interaction   Topic: effective communication skills    Discipline Responsible:   OT  AT  [facility]. X RT  Other       Participation Level:     None  Minimal   X Active Listener X Interactive    Monopolizing         Participation Quality:  X Appropriate  Inappropriate   X       Attentive        Intrusive          Sharing        Resistant          Supportive        Lethargic       Affective:   X Congruent  Incongruent  Blunted  Flat    Constricted  Anxious  Elated  Angry    Labile  Depressed  Other         Cognitive:  X Alert X Oriented PPTP     Concentration X G  F  P   Attention Span X G  F  P   Short-Term Memory  G  F  P   Long-Term Memory  G  F  P   ProblemSolving/  Decision Making X G  F  P   Ability to Process  Information X G  F  P      Contributing Factors             Delusional             Hallucinating             Flight of Ideas             Other:       Modes of Intervention:  X Education X Support X Exploration    Clarifying X Problem Solving  Confrontation   X Socialization  Limit Setting  Reality Testing   X Activity  Movement  Media    Other:            Response to Learning:  X Able to verbalize current knowledge/experience   X Able to verbalize/acknowledge new learning    Able to retain information   X Capable of insight   X Able to change behavior    Progressing to goal    Other:        Comments:
[Initial] : an initial consultation for